# Patient Record
Sex: FEMALE | Race: WHITE | Employment: OTHER | URBAN - METROPOLITAN AREA
[De-identification: names, ages, dates, MRNs, and addresses within clinical notes are randomized per-mention and may not be internally consistent; named-entity substitution may affect disease eponyms.]

---

## 2017-02-20 ENCOUNTER — APPOINTMENT (OUTPATIENT)
Dept: BONE DENSITY | Age: 82
End: 2017-02-20
Attending: INTERNAL MEDICINE
Payer: MEDICARE

## 2017-02-20 ENCOUNTER — HOSPITAL ENCOUNTER (OUTPATIENT)
Dept: MAMMOGRAPHY | Age: 82
Discharge: HOME | End: 2017-02-20
Payer: MEDICARE

## 2017-02-20 ENCOUNTER — HOSPITAL ENCOUNTER (OUTPATIENT)
Dept: ULTRASOUND IMAGING | Age: 82
Discharge: HOME | End: 2017-02-20
Payer: MEDICARE

## 2017-02-20 DIAGNOSIS — N64.52 BLOODY DISCHARGE FROM RIGHT NIPPLE: ICD-10-CM

## 2017-02-20 PROCEDURE — 77065 DX MAMMO INCL CAD UNI: CPT

## 2017-02-20 PROCEDURE — 76642 ULTRASOUND BREAST LIMITED: CPT

## 2018-05-07 ENCOUNTER — OFFICE VISIT (OUTPATIENT)
Dept: SURGERY | Age: 83
End: 2018-05-07

## 2018-05-07 VITALS
HEART RATE: 74 BPM | RESPIRATION RATE: 16 BRPM | OXYGEN SATURATION: 97 % | HEIGHT: 63 IN | WEIGHT: 145 LBS | TEMPERATURE: 97.6 F | BODY MASS INDEX: 25.69 KG/M2

## 2018-05-07 DIAGNOSIS — K64.0 GRADE I HEMORRHOIDS: Primary | ICD-10-CM

## 2018-05-07 RX ORDER — GLIMEPIRIDE 1 MG/1
1 TABLET ORAL DAILY
COMMUNITY
Start: 2018-03-15

## 2018-05-07 RX ORDER — VITAMIN E 268 MG
CAPSULE ORAL DAILY
COMMUNITY

## 2018-05-07 NOTE — PROGRESS NOTES
HPI: Kartik Dempsey is a 80 y.o. female presenting with chief complain of hemorrhoids. The patient felt a balloon type enlargement in the rectal area. This was in February but has not been persistent since that time. She denies bleeding or pain. She has some difficulty cleaning. She states she had a colonoscopy more than 10 years ago. She denies abdominal complaints and has 2-4 bowel movements per day. She denies constipation or diarrhea. She has some occasional soilage of stool and perhaps twice per week fecal incontinence to solid stool. She states she is unaware of the passage of the stool and wears a pad. Past Medical History:   Diagnosis Date    Diabetes ([facility] [address]     Gout     Hearing loss     Hypercholesterolemia     Hypertension     Nipple discharge     Dark blood intermitten    Thyroid disease     hypothyroidism       Past Surgical History:   Procedure Laterality Date    HX CHOLECYSTECTOMY      HX COLONOSCOPY      HX TUMOR REMOVAL      side of abdomen       Family medical history: Negative for colorectal disorders    Social History     Social History    Marital status:      Spouse name: N/A    Number of children: N/A    Years of education: N/A     Social History Main Topics    Smoking status: Former Smoker    Smokeless tobacco: Former User     Quit date: 1987    Alcohol use No    Drug use: None    Sexual activity: Not Asked     Other Topics Concern    None     Social History Narrative       Review of Systems - Review of Systems   Constitutional: Negative. HENT: Positive for hearing loss. Negative for congestion, ear discharge, ear pain, nosebleeds, sinus pain, sore throat and tinnitus. Wears hearing aides   Eyes: Positive for blurred vision. Negative for double vision, photophobia, pain, discharge and redness. Right eye itchy burning watering   Respiratory: Negative. Negative for stridor. Cardiovascular: Negative. Gastrointestinal: Negative. Genitourinary: Negative. Musculoskeletal: Positive for joint pain and neck pain. Negative for back pain, falls and myalgias. Skin: Negative. Neurological: Negative. Endo/Heme/Allergies: Negative. Psychiatric/Behavioral: Negative for depression, hallucinations, memory loss, substance abuse and suicidal ideas. The patient has insomnia. The patient is not nervous/anxious. Outpatient Prescriptions Marked as Taking for the 5/7/18 encounter (Office Visit) with Ivis Coley MD   Medication Sig Dispense Refill    vitamin E (AQUA GEMS) 400 unit capsule Take  by mouth daily.  glimepiride (AMARYL) 1 mg tablet       amLODIPine (NORVASC) 2.5 mg tablet TAKE 1 TABLET BY MOUTH EVERY MORNING  2    levothyroxine (SYNTHROID) 88 mcg tablet TAKE 1 TABLET BY MOUTH DAILY  1    allopurinol (ZYLOPRIM) 300 mg tablet Take 150 mg by mouth daily.  metoprolol tartrate (LOPRESSOR) 50 mg tablet Take  by mouth two (2) times a day. Allergies   Allergen Reactions    Codeine Unknown (comments)    Erythromycin Other (comments)     Pt unable to remember reaction        Vitals:    05/07/18 1106   Pulse: 74   Resp: 16   Temp: 97.6 °F (36.4 °C)   TempSrc: Oral   SpO2: 97%   Weight: 65.8 kg (145 lb)   Height: 5' 3\" (1.6 m)   PainSc:   0 - No pain       Physical Exam   Constitutional: She appears well-developed and well-nourished. HENT:   Head: Normocephalic and atraumatic. Eyes: Conjunctivae and EOM are normal.   Abdominal: Soft. She exhibits no distension. There is no tenderness. Musculoskeletal: Normal range of motion. Lymphadenopathy:     She has no cervical adenopathy. Right: No inguinal adenopathy present. Left: No inguinal adenopathy present. Neurological: She exhibits normal muscle tone. Skin: Skin is warm and dry. No rash noted. Psychiatric: She has a normal mood and affect.  Her speech is normal.   Rectum: Multi-quadrant external hemorrhoidal disease  Digital rectal exam: Moderate tone, no mass  Anoscopy: No substantial internal hemorrhoidal disease    Assessment / Plan    Grade 1 hemorrhoids, fecal incontinence  Recommend high-fiber diet with fiber supplementation of Metamucil  Follow-up in the office as needed  Given her age I am reluctant to offer her hemorrhoidectomy    The diagnoses and plan were discussed with the patient. All questions answered. Plan of care agreed to by all concerned.

## 2018-05-07 NOTE — LETTER
5/7/2018 11:44 AM 
 
Patient:  Yin Novak YOB: 1926 Date of Visit: 5/7/2018 Kenyon Aguayo MD 
[address] [address] [place] [address] VIA Facsimile: [phone] Dear Prieto Ardon saw Naldo Ling in the office today for her hemorrhoids. She states that 3 months ago she had a balloon-like enlargement of her anal tissue. It has not recurred since then. She denies any significant discomfort, though she tells me she has difficulty cleaning the area after bowel function. On exam she has mildly enlarged external hemorrhoidal disease circumferentially. Digital rectal exam and anoscopy are otherwise unremarkable. Given her age I would certainly prefer to manage her conservatively, and recommended a high-fiber diet and fiber supplementation with Metamucil. She can return to the office if the swelling continues, however I am reluctant to offer her hemorrhoidectomy given her advanced age. Given that her hemorrhoidal disease is largely external I do not feel that rubber band ligation of the internal hemorrhoids would be particularly effective. Thank you very much for your referral of Ms. Yin Novak. If you have questions, please do not hesitate to call me. I look forward to following Ms. aRj Bowden along with you and will keep you updated as to her progress. Sincerely, Linda Jones MD

## 2018-09-04 ENCOUNTER — HOSPITAL ENCOUNTER (OUTPATIENT)
Dept: GENERAL RADIOLOGY | Age: 83
Discharge: HOME | End: 2018-09-04
Payer: MEDICARE

## 2018-09-04 DIAGNOSIS — M54.6 THORACIC BACK PAIN: ICD-10-CM

## 2018-09-04 DIAGNOSIS — R07.81 RIB PAIN: ICD-10-CM

## 2018-09-04 PROCEDURE — 71100 X-RAY EXAM RIBS UNI 2 VIEWS: CPT

## 2018-09-04 PROCEDURE — 72074 X-RAY EXAM THORAC SPINE4/>VW: CPT

## 2018-10-05 ENCOUNTER — HOSPITAL ENCOUNTER (INPATIENT)
Age: 83
LOS: 7 days | Discharge: HOME HEALTH | DRG: 202 | End: 2018-10-12
Attending: EMERGENCY MEDICINE | Admitting: INTERNAL MEDICINE
Payer: MEDICARE

## 2018-10-05 ENCOUNTER — APPOINTMENT (OUTPATIENT)
Dept: CT IMAGING | Age: 83
DRG: 202 | End: 2018-10-05
Attending: EMERGENCY MEDICINE
Payer: MEDICARE

## 2018-10-05 ENCOUNTER — APPOINTMENT (OUTPATIENT)
Dept: GENERAL RADIOLOGY | Age: 83
DRG: 202 | End: 2018-10-05
Attending: EMERGENCY MEDICINE
Payer: MEDICARE

## 2018-10-05 DIAGNOSIS — J18.9 COMMUNITY ACQUIRED PNEUMONIA OF LEFT LUNG, UNSPECIFIED PART OF LUNG: Primary | ICD-10-CM

## 2018-10-05 DIAGNOSIS — R06.02 SOB (SHORTNESS OF BREATH): ICD-10-CM

## 2018-10-05 DIAGNOSIS — J42 CHRONIC BRONCHITIS, UNSPECIFIED CHRONIC BRONCHITIS TYPE (HCC): ICD-10-CM

## 2018-10-05 PROBLEM — J20.9 ACUTE BRONCHITIS: Status: ACTIVE | Noted: 2018-10-05

## 2018-10-05 LAB
ALBUMIN SERPL-MCNC: 3.8 G/DL (ref 3.4–5)
ALBUMIN/GLOB SERPL: 1 {RATIO} (ref 0.8–1.7)
ALP SERPL-CCNC: 87 U/L (ref 45–117)
ALT SERPL-CCNC: 54 U/L (ref 13–56)
ANION GAP SERPL CALC-SCNC: 12 MMOL/L (ref 3–18)
AST SERPL-CCNC: 33 U/L (ref 15–37)
BASOPHILS # BLD: 0.1 K/UL (ref 0–0.1)
BASOPHILS NFR BLD: 1 % (ref 0–2)
BILIRUB SERPL-MCNC: 0.7 MG/DL (ref 0.2–1)
BNP SERPL-MCNC: 195 PG/ML (ref 0–1800)
BUN SERPL-MCNC: 28 MG/DL (ref 7–18)
BUN/CREAT SERPL: 18 (ref 12–20)
CALCIUM SERPL-MCNC: 9.4 MG/DL (ref 8.5–10.1)
CHLORIDE SERPL-SCNC: 109 MMOL/L (ref 100–108)
CK MB CFR SERPL CALC: NORMAL % (ref 0–4)
CK MB SERPL-MCNC: <1 NG/ML (ref 5–25)
CK SERPL-CCNC: 26 U/L (ref 26–192)
CO2 SERPL-SCNC: 19 MMOL/L (ref 21–32)
CREAT SERPL-MCNC: 1.53 MG/DL (ref 0.6–1.3)
DIFFERENTIAL METHOD BLD: NORMAL
EOSINOPHIL # BLD: 0.1 K/UL (ref 0–0.4)
EOSINOPHIL NFR BLD: 1 % (ref 0–5)
ERYTHROCYTE [DISTWIDTH] IN BLOOD BY AUTOMATED COUNT: 14.2 % (ref 11.6–14.5)
GLOBULIN SER CALC-MCNC: 3.7 G/DL (ref 2–4)
GLUCOSE BLD STRIP.AUTO-MCNC: 103 MG/DL (ref 70–110)
GLUCOSE BLD STRIP.AUTO-MCNC: 105 MG/DL (ref 70–110)
GLUCOSE BLD STRIP.AUTO-MCNC: 79 MG/DL (ref 70–110)
GLUCOSE BLD STRIP.AUTO-MCNC: 98 MG/DL (ref 70–110)
GLUCOSE SERPL-MCNC: 169 MG/DL (ref 74–99)
HCT VFR BLD AUTO: 44 % (ref 35–45)
HGB BLD-MCNC: 14.9 G/DL (ref 12–16)
LACTATE BLD-SCNC: 1.2 MMOL/L (ref 0.4–2)
LYMPHOCYTES # BLD: 3 K/UL (ref 0.9–3.6)
LYMPHOCYTES NFR BLD: 32 % (ref 21–52)
MCH RBC QN AUTO: 32.8 PG (ref 24–34)
MCHC RBC AUTO-ENTMCNC: 33.9 G/DL (ref 31–37)
MCV RBC AUTO: 96.9 FL (ref 74–97)
MONOCYTES # BLD: 0.8 K/UL (ref 0.05–1.2)
MONOCYTES NFR BLD: 8 % (ref 3–10)
NEUTS SEG # BLD: 5.6 K/UL (ref 1.8–8)
NEUTS SEG NFR BLD: 58 % (ref 40–73)
PLATELET # BLD AUTO: 292 K/UL (ref 135–420)
PMV BLD AUTO: 10 FL (ref 9.2–11.8)
POTASSIUM SERPL-SCNC: 4.5 MMOL/L (ref 3.5–5.5)
PROT SERPL-MCNC: 7.5 G/DL (ref 6.4–8.2)
RBC # BLD AUTO: 4.54 M/UL (ref 4.2–5.3)
SODIUM SERPL-SCNC: 140 MMOL/L (ref 136–145)
TROPONIN I SERPL-MCNC: <0.02 NG/ML (ref 0–0.04)
WBC # BLD AUTO: 9.6 K/UL (ref 4.6–13.2)

## 2018-10-05 PROCEDURE — 74011250636 HC RX REV CODE- 250/636: Performed by: EMERGENCY MEDICINE

## 2018-10-05 PROCEDURE — 74011000250 HC RX REV CODE- 250: Performed by: EMERGENCY MEDICINE

## 2018-10-05 PROCEDURE — 74011250637 HC RX REV CODE- 250/637: Performed by: INTERNAL MEDICINE

## 2018-10-05 PROCEDURE — 85025 COMPLETE CBC W/AUTO DIFF WBC: CPT | Performed by: EMERGENCY MEDICINE

## 2018-10-05 PROCEDURE — 82550 ASSAY OF CK (CPK): CPT | Performed by: EMERGENCY MEDICINE

## 2018-10-05 PROCEDURE — 96361 HYDRATE IV INFUSION ADD-ON: CPT

## 2018-10-05 PROCEDURE — 82962 GLUCOSE BLOOD TEST: CPT

## 2018-10-05 PROCEDURE — 96374 THER/PROPH/DIAG INJ IV PUSH: CPT

## 2018-10-05 PROCEDURE — 71045 X-RAY EXAM CHEST 1 VIEW: CPT

## 2018-10-05 PROCEDURE — 77030029684 HC NEB SM VOL KT MONA -A

## 2018-10-05 PROCEDURE — 83880 ASSAY OF NATRIURETIC PEPTIDE: CPT | Performed by: EMERGENCY MEDICINE

## 2018-10-05 PROCEDURE — 99285 EMERGENCY DEPT VISIT HI MDM: CPT

## 2018-10-05 PROCEDURE — 94640 AIRWAY INHALATION TREATMENT: CPT

## 2018-10-05 PROCEDURE — 70491 CT SOFT TISSUE NECK W/DYE: CPT

## 2018-10-05 PROCEDURE — 74011250636 HC RX REV CODE- 250/636: Performed by: INTERNAL MEDICINE

## 2018-10-05 PROCEDURE — 65660000000 HC RM CCU STEPDOWN

## 2018-10-05 PROCEDURE — 74011636320 HC RX REV CODE- 636/320: Performed by: EMERGENCY MEDICINE

## 2018-10-05 PROCEDURE — 83605 ASSAY OF LACTIC ACID: CPT

## 2018-10-05 PROCEDURE — 87040 BLOOD CULTURE FOR BACTERIA: CPT | Performed by: EMERGENCY MEDICINE

## 2018-10-05 PROCEDURE — 80053 COMPREHEN METABOLIC PANEL: CPT | Performed by: EMERGENCY MEDICINE

## 2018-10-05 RX ORDER — GLIMEPIRIDE 2 MG/1
1 TABLET ORAL
Status: DISCONTINUED | OUTPATIENT
Start: 2018-10-06 | End: 2018-10-06

## 2018-10-05 RX ORDER — ALBUTEROL SULFATE 0.83 MG/ML
2.5 SOLUTION RESPIRATORY (INHALATION)
Status: COMPLETED | OUTPATIENT
Start: 2018-10-05 | End: 2018-10-05

## 2018-10-05 RX ORDER — DEXTROSE 50 % IN WATER (D50W) INTRAVENOUS SYRINGE
25-50 AS NEEDED
Status: DISCONTINUED | OUTPATIENT
Start: 2018-10-05 | End: 2018-10-12 | Stop reason: HOSPADM

## 2018-10-05 RX ORDER — LEVOFLOXACIN 5 MG/ML
750 INJECTION, SOLUTION INTRAVENOUS EVERY 24 HOURS
Status: DISCONTINUED | OUTPATIENT
Start: 2018-10-05 | End: 2018-10-05

## 2018-10-05 RX ORDER — MAGNESIUM SULFATE 100 %
16 CRYSTALS MISCELLANEOUS AS NEEDED
Status: DISCONTINUED | OUTPATIENT
Start: 2018-10-05 | End: 2018-10-12 | Stop reason: HOSPADM

## 2018-10-05 RX ORDER — NYSTATIN 100000 [USP'U]/ML
10 SUSPENSION ORAL 2 TIMES DAILY
COMMUNITY
End: 2019-07-29

## 2018-10-05 RX ORDER — SODIUM CHLORIDE 9 MG/ML
50 INJECTION, SOLUTION INTRAVENOUS CONTINUOUS
Status: DISCONTINUED | OUTPATIENT
Start: 2018-10-05 | End: 2018-10-11

## 2018-10-05 RX ORDER — MELATONIN
5000 DAILY
Status: DISCONTINUED | OUTPATIENT
Start: 2018-10-06 | End: 2018-10-12 | Stop reason: HOSPADM

## 2018-10-05 RX ORDER — ACETAMINOPHEN 325 MG/1
650 TABLET ORAL
Status: DISCONTINUED | OUTPATIENT
Start: 2018-10-05 | End: 2018-10-12 | Stop reason: HOSPADM

## 2018-10-05 RX ORDER — LEVOFLOXACIN 5 MG/ML
750 INJECTION, SOLUTION INTRAVENOUS
Status: DISCONTINUED | OUTPATIENT
Start: 2018-10-07 | End: 2018-10-06

## 2018-10-05 RX ORDER — AMLODIPINE BESYLATE 2.5 MG/1
2.5 TABLET ORAL DAILY
Status: DISCONTINUED | OUTPATIENT
Start: 2018-10-06 | End: 2018-10-12 | Stop reason: HOSPADM

## 2018-10-05 RX ORDER — LEVOTHYROXINE SODIUM 88 UG/1
88 TABLET ORAL
Status: DISCONTINUED | OUTPATIENT
Start: 2018-10-06 | End: 2018-10-12 | Stop reason: HOSPADM

## 2018-10-05 RX ORDER — METOPROLOL TARTRATE 50 MG/1
50 TABLET ORAL 2 TIMES DAILY
Status: DISCONTINUED | OUTPATIENT
Start: 2018-10-05 | End: 2018-10-12 | Stop reason: HOSPADM

## 2018-10-05 RX ORDER — ALLOPURINOL 100 MG/1
150 TABLET ORAL DAILY
Status: DISCONTINUED | OUTPATIENT
Start: 2018-10-06 | End: 2018-10-12 | Stop reason: HOSPADM

## 2018-10-05 RX ORDER — LANOLIN ALCOHOL/MO/W.PET/CERES
1000 CREAM (GRAM) TOPICAL DAILY
Status: DISCONTINUED | OUTPATIENT
Start: 2018-10-06 | End: 2018-10-12 | Stop reason: HOSPADM

## 2018-10-05 RX ORDER — ONDANSETRON 2 MG/ML
4 INJECTION INTRAMUSCULAR; INTRAVENOUS
Status: DISCONTINUED | OUTPATIENT
Start: 2018-10-05 | End: 2018-10-12 | Stop reason: HOSPADM

## 2018-10-05 RX ORDER — INSULIN LISPRO 100 [IU]/ML
INJECTION, SOLUTION INTRAVENOUS; SUBCUTANEOUS
Status: DISCONTINUED | OUTPATIENT
Start: 2018-10-05 | End: 2018-10-12 | Stop reason: HOSPADM

## 2018-10-05 RX ORDER — LOVASTATIN 20 MG/1
40 TABLET ORAL DAILY
Status: DISCONTINUED | OUTPATIENT
Start: 2018-10-06 | End: 2018-10-12 | Stop reason: HOSPADM

## 2018-10-05 RX ADMIN — SODIUM CHLORIDE 500 ML: 900 INJECTION, SOLUTION INTRAVENOUS at 08:05

## 2018-10-05 RX ADMIN — METOPROLOL TARTRATE 50 MG: 50 TABLET ORAL at 19:05

## 2018-10-05 RX ADMIN — IOPAMIDOL 50 ML: 612 INJECTION, SOLUTION INTRAVENOUS at 09:30

## 2018-10-05 RX ADMIN — CEFTRIAXONE SODIUM 1 G: 1 INJECTION, POWDER, FOR SOLUTION INTRAMUSCULAR; INTRAVENOUS at 09:01

## 2018-10-05 RX ADMIN — ALBUTEROL SULFATE 2.5 MG: 2.5 SOLUTION RESPIRATORY (INHALATION) at 09:43

## 2018-10-05 RX ADMIN — ALBUTEROL SULFATE 2.5 MG: 2.5 SOLUTION RESPIRATORY (INHALATION) at 08:05

## 2018-10-05 RX ADMIN — LEVOFLOXACIN 750 MG: 5 INJECTION, SOLUTION INTRAVENOUS at 14:05

## 2018-10-05 RX ADMIN — SODIUM CHLORIDE 75 ML/HR: 900 INJECTION, SOLUTION INTRAVENOUS at 14:09

## 2018-10-05 RX ADMIN — ALBUTEROL SULFATE 2.5 MG: 2.5 SOLUTION RESPIRATORY (INHALATION) at 13:50

## 2018-10-05 NOTE — ED NOTES
TRANSFER - OUT REPORT: 
 
Verbal report given to ZOE Vivar(name) on Laney Pouch  being transferred to 3N(unit) for routine progression of care Report consisted of patients Situation, Background, Assessment and  
Recommendations(SBAR). Information from the following report(s) SBAR, ED Summary, Intake/Output, MAR, Recent Results and Cardiac Rhythm NSR was reviewed with the receiving nurse. Opportunity for questions and clarification was provided. Patient transported with: 
Patient Transport

## 2018-10-05 NOTE — ED NOTES
Pt arrived to the ED with co SOB. Pt speaking in broken sentences. Pt states, \"I've had an ear infection and a sore throat for a week. \" Pt has been taking nystatin

## 2018-10-05 NOTE — ED NOTES
Pt up to bedside commode at this time, tolerated well, reported sob while moving, resolved when returned to bed, mild upper resp wheezing noted upon movement, resolved when returned to bed.

## 2018-10-05 NOTE — PROGRESS NOTES
conducted an initial consultation and Spiritual Assessment for Castillo Millan, who is a 80 y.o.,female. Patients Primary Language is: [place]. According to the patients EMR [employer] Affiliation is: [place]. The reason the Patient came to the hospital is:  
Patient Active Problem List  
 Diagnosis Date Noted  Acute bronchitis 10/05/2018  SOB (shortness of breath) 10/05/2018  Community acquired pneumonia 10/05/2018  Bloody discharge from right nipple 08/12/2016 The  provided the following Interventions: 
Initiated a relationship of care and support. Explored issues of adarsh, belief, spirituality and Sikh/ritual needs while hospitalized. Listened empathically. Provided chaplaincy education. Provided information about Spiritual Care Services. Offered prayer and assurance of continued prayers on patient's behalf. Chart reviewed. The following outcomes where achieved: 
Patient shared limited information about both their medical narrative and spiritual journey/beliefs. Patient processed feeling about current hospitalization. Patient expressed gratitude for 's visit. Assessment: 
Patient does not have any Sikh/cultural needs that will affect patients preferences in health care. There are no spiritual or Sikh issues which require intervention at this time. Plan: 
Chaplains will continue to follow and will provide pastoral care on an as needed/requested basis.  recommends bedside caregivers page  on duty if patient shows signs of acute spiritual or emotional distress. [address] [facility]  
[phone]

## 2018-10-05 NOTE — IP AVS SNAPSHOT
[address] [address] [place] Patient: Blaze Leyva MRN: [id number] [facility]:[birth year] About your hospitalization You were admitted on:  October 5, 2018 You last received care in the:  [facility] You were discharged on:  October 12, 2018 Why you were hospitalized Your primary diagnosis was:  Not on File Your diagnoses also included:  Acute Bronchitis, Sob (Shortness Of Breath), Community Acquired Pneumonia Follow-up Information Follow up With Details Comments Contact Info Indira Dickey MD Schedule an appointment as soon as possible for a visit in 5 days  [address] [facility] [address] [facility] [id number] [phone] Kenia Ward MD On 11/15/2018 at 9:20AM; Please bring Photo ID, Insurance Card & list of meds. [address] [address] Discharge Orders None A check sindhu indicates which time of day the medication should be taken. My Medications START taking these medications Instructions Each Dose to Equal  
 Morning Noon Evening Bedtime  
 benzonatate 100 mg capsule Commonly known as:  TESSALON Take 1 Cap by mouth three (3) times daily as needed for Cough for up to 7 days. 100 mg  
    
   
   
   
  
 docusate sodium 100 mg capsule Commonly known as:  [place] Take 1 Cap by mouth two (2) times daily as needed for Constipation for up to 90 days. 100 mg  
    
   
   
   
  
 famotidine 20 mg tablet Commonly known as:  PEPCID Your next dose is:  10/12 8pm  
   
 Take 1 Tab by mouth two (2) times a day. 20 mg  
    
   
   
   
  
  
 guaiFENesin 100 mg/5 mL liquid Commonly known as:  ROBITUSSIN Take 5 mL by mouth every four (4) hours as needed for Cough. 100 mg Lactobacillus Acidoph & Bulgar 1 million cell Tab tablet Commonly known as:  Marya Hurst Your next dose is:  10/13 8pm  
   
 Take 2 Tabs by mouth two (2) times a day. 2 Tab  
    
   
   
   
  
  
 levoFLOXacin 750 mg tablet Commonly known as:  René Méndez Start taking on:  10/13/2018 Take 1 Tab by mouth every fourty-eight (48) hours for 4 days. 750 mg  
    
  
   
   
   
  
 phenol throat spray 1.4 % spray Commonly known as:  Terisa Gaster Take 1 Spray by mouth as needed for Sore throat. 1 Spray CONTINUE taking these medications Instructions Each Dose to Equal  
 Morning Noon Evening Bedtime  
 allopurinol 300 mg tablet Commonly known as:  Ilona Seed Your next dose is:  10/13 9am  
   
 Take 150 mg by mouth daily. 150 mg  
    
  
   
   
   
  
 amLODIPine 2.5 mg tablet Commonly known as:  Hersdina Saras Your next dose is:  10/13 9am  
   
 TAKE 1 TABLET BY MOUTH EVERY MORNING  
     
  
   
   
   
  
 glimepiride 1 mg tablet Commonly known as:  AMARYL Your next dose is:  10/13 9am  
   
      
  
   
   
   
  
 levothyroxine 88 mcg tablet Commonly known as:  SYNTHROID Your next dose is:  10/13 7am  
   
 TAKE 1 TABLET BY MOUTH DAILY lovastatin 40 mg tablet Commonly known as:  MEVACOR Your next dose is:  10/13 9am  
   
 TAKE 1 TABLET BY MOUTH EVERY DAY  
     
  
   
   
   
  
 metFORMIN  mg tablet Commonly known as:  GLUCOPHAGE XR Your next dose is:  10/13 9am  
   
 Take 750 mg by mouth daily. 750 mg  
    
  
   
   
   
  
 metoprolol tartrate 50 mg tablet Commonly known as:  LOPRESSOR Your next dose is:  10/12 8pm  
   
 Take  by mouth two (2) times a day. nystatin 100,000 unit/mL suspension Commonly known as:  MYCOSTATIN Your next dose is:  10/12 8pm  
   
 Take 10 mL by mouth two (2) times a day. 10 mL  
    
   
   
   
  
  
 VITAMIN B-12 1,000 mcg tablet Generic drug:  cyanocobalamin Your next dose is:  10/13 9am  
   
 Take 1,000 mcg by mouth daily. 1000 mcg VITAMIN D3 1,000 unit tablet Generic drug:  cholecalciferol Your next dose is:  10/13 9am  
   
 Take 5,000 Units by mouth daily. 5000 Units  
    
   
   
   
  
 vitamin E 400 unit capsule Commonly known as:  [address] Your next dose is:  10/13 9am  
   
 Take  by mouth daily. STOP taking these medications   
 indomethacin 50 mg capsule Commonly known as:  INDOCIN Where to Get Your Medications Information on where to get these meds will be given to you by the nurse or doctor. ! Ask your nurse or doctor about these medications  
  benzonatate 100 mg capsule  
 docusate sodium 100 mg capsule  
 famotidine 20 mg tablet  
 guaiFENesin 100 mg/5 mL liquid Lactobacillus Acidoph & Bulgar 1 million cell Tab tablet  
 levoFLOXacin 750 mg tablet  
 phenol throat spray 1.4 % spray Discharge Instructions Discharge Instructions Patient: Valerie Wadsworth MRN: [id number]  CSN: [id number] YOB: 1926  Age: 80 y.o. Sex: female DOA: 10/5/2018 LOS:  LOS: 7 days   Discharge Date: DIET:  Cardiac and Diabetic mechanical soft Diet with necator thick liquids ACTIVITY: Activity as tolerated Home health care for Skilled care for DM, Hypertension and medication management 
 
·    PT/OT consult ·             Speech consult ADDITIONAL INFORMATION: If you experience any of the following symptoms but not limited to Fever, chills, nausea, vomiting, diarrhea, change in mentation, falling, bleeding, shortness of breath, chest pain, please call your primary care physician or return to the emergency room if you cannot get hold of your doctor:  
 
FOLLOW UP CARE: 
Dr. Himanshu Montana MD in 7-10 days. Please call and set up an appointment. LEXI Cardona in 4 week David Galo MD 
10/12/2018 12:31 PM 
 
 
 
 
 
  
  
  
 Sparxent Announcement We are excited to announce that we are making your provider's discharge notes available to you in Sparxent. You will see these notes when they are completed and signed by the physician that discharged you from your recent hospital stay. If you have any questions or concerns about any information you see in Sparxent, please call the Health Information Department where you were seen or reach out to your Primary Care Provider for more information about your plan of care. Introducing hospitals & HEALTH SERVICES! [facility] introduces Sparxent patient portal. Now you can access parts of your medical record, email your doctor's office, and request medication refills online. 1. In your internet browser, go to [email or web address] 2. Click on the First Time User? Click Here link in the Sign In box. You will see the New Member Sign Up page. 3. Enter your Sparxent Access Code exactly as it appears below. You will not need to use this code after youve completed the sign-up process. If you do not sign up before the expiration date, you must request a new code. · Sparxent Access Code: [email or web address] Expires: 12/3/2018  9:54 AM 
 
4. Enter the last four digits of your Social Security Number (xxxx) and Date of Birth (mm/dd/yyyy) as indicated and click Submit. You will be taken to the next sign-up page. 5. Create a Sparxent ID. This will be your Sparxent login ID and cannot be changed, so think of one that is secure and easy to remember. 6. Create a Sparxent password. You can change your password at any time. 7. Enter your Password Reset Question and Answer. This can be used at a later time if you forget your password. 8. Enter your e-mail address. You will receive e-mail notification when new information is available in [address] 9. Click Sign Up. You can now view and download portions of your medical record. 10. Click the Download Summary menu link to download a portable copy of your medical information. If you have questions, please visit the Frequently Asked Questions section of the [employer] website. Remember, [employer] is NOT to be used for urgent needs. For medical emergencies, dial 911. Now available from your iPhone and Android! Introducing Umair Nj As a [facility] patient, I wanted to make you aware of our electronic visit tool called Umair Nj. [employer] allows you to connect within minutes with a medical provider 24 hours a day, seven days a week via a mobile device or tablet or logging into a secure website from your computer. You can access Umair Nj from anywhere in the [place]. A virtual visit might be right for you when you have a simple condition and feel like you just dont want to get out of bed, or cant get away from work for an appointment, when your regular [facility] provider is not available (evenings, weekends or holidays), or when youre out of town and need minor care. Electronic visits cost only $49 and if the [employer] provider determines a prescription is needed to treat your condition, one can be electronically transmitted to a nearby pharmacy*. Please take a moment to enroll today if you have not already done so. The enrollment process is free and takes just a few minutes. To enroll, please download the WebNotes eliecer to your tablet or phone, or visit [email or web address] to enroll on your computer. And, as an [address] [place] [address] patient with a [employer] account, the results of your visits will be scanned into your electronic medical record and your primary care provider will be able to view the scanned results. We urge you to continue to see your regular [facility] provider for your ongoing medical care.   And while your primary care provider may not be the one available when you seek a Umair Bolañosnadjafin virtual visit, the peace of mind you get from getting a real diagnosis real time can be priceless. For more information on [employer], view our Frequently Asked Questions (FAQs) at [email or web address]. Sincerely, 
 
Attila Chu MD 
Chief Medical Officer [employer] *:  certain medications cannot be prescribed via Umair Mikyiza Providers Seen During Your Hospitalization Provider Specialty Primary office phone Nima Freedman DO Emergency Medicine [phone] Marisol Medellin MD Internal Medicine [phone] Lawanda Hamilton MD Internal Medicine [phone] Spike Roberto MD Internal Medicine [phone] Immunizations Administered for This Admission Name Date Influenza Vaccine (Quad) PF 10/12/2018,  Deferred () Your Primary Care Physician (PCP) Primary Care Physician Office Phone Office Fax Hernando Rivera [phone] You are allergic to the following Allergen Reactions Codeine Unknown (comments) Erythromycin Other (comments) Pt unable to remember reaction Recent Documentation Height Weight Breastfeeding? BMI OB Status Smoking Status 1.6 m 60.9 kg No 23.77 kg/m2 Postmenopausal Former Smoker Emergency Contacts Name Discharge Info Relation Home Work Mobile Yon Zamora DISCHARGE CAREGIVER [3] Child [2] [phone] Patient Belongings The following personal items are in your possession at time of discharge: 
     Visual Aid: At bedside, Glasses   Hearing Aids/Status: With patient  Home Medications: None   Jewelry: Ring          Personal Items Sent to Safe: none Please provide this summary of care documentation to your next provider. Signatures-by signing, you are acknowledging that this After Visit Summary has been reviewed with you and you have received a copy. Patient Signature:  ____________________________________________________________ Date:  ____________________________________________________________  
  
Brittnee Journey Provider Signature:  ____________________________________________________________ Date:  ____________________________________________________________

## 2018-10-05 NOTE — ED PROVIDER NOTES
EMERGENCY DEPARTMENT HISTORY AND PHYSICAL EXAM 
 
7:40 AM 
 
 
Date: 10/5/2018 Patient Name: Nusrat Akhtar History of Presenting Illness Chief Complaint Patient presents with  Shortness of Breath History Provided By: Patient and patient's son Chief Complaint: SOB Duration:  6 days Timing:  Worsening Location: N/A Quality: N/A Severity: N/A Modifying Factors: The patient was recently seen at [facility] and her PCP Associated Symptoms: Trouble swallowing and cough. . Denies fever, chills, vomiting, diarrhea, chest pain. Additional History (Context): Nusrat Akhtar is a 80 y.o. female who presents with worsening SOB with associated symptoms wheezing and cough for 6 days. She reports difficulty eating and drinking stating \"it just won't go down\" and \"it feels like there is stuff in my throat\". She denies fever, chills, vomiting, diarrhea, and chest pain. The patient was recently seen by [facility] and her PCP last week for ear pain and sore throat which are currently resolved. Denies any history of asthma or COPD. PCP: Yuko Collazo MD 
 
 
Past History Past Medical History: 
Past Medical History:  
Diagnosis Date  Diabetes ([address]  Gout  Hearing loss  Hypercholesterolemia  Hypertension  Nipple discharge Dark blood intermitten  Thyroid disease   
 hypothyroidism Past Surgical History: 
Past Surgical History:  
Procedure Laterality Date  HX CHOLECYSTECTOMY  HX COLONOSCOPY    
 HX TUMOR REMOVAL    
 side of abdomen Family History: 
History reviewed. No pertinent family history. Social History: 
Social History Substance Use Topics  Smoking status: Former Smoker  Smokeless tobacco: Former User Quit date: 26  Alcohol use No  
 
 
Allergies: Allergies Allergen Reactions  Codeine Unknown (comments)  Erythromycin Other (comments) Pt unable to remember reaction Review of Systems Review of Systems Constitutional: Negative for fever. HENT: Positive for trouble swallowing. Negative for rhinorrhea and sinus pressure. Eyes: Negative for redness and visual disturbance. Respiratory: Positive for cough and shortness of breath. Negative for wheezing. Cardiovascular: Negative for chest pain and palpitations. Gastrointestinal: Negative for abdominal pain, diarrhea, nausea and vomiting. Genitourinary: Negative for difficulty urinating and dysuria. Musculoskeletal: Negative for arthralgias and neck stiffness. Skin: Negative for pallor. Neurological: Negative for dizziness and headaches. Hematological: Does not bruise/bleed easily. All other systems reviewed and are negative. Physical Exam  
 
Visit Vitals  /66  Pulse 70  Temp 96.8 °F (36 °C)  Resp 25  SpO2 98% Physical Exam  
Constitutional: She is oriented to person, place, and time. She appears well-developed and well-nourished. No distress. HENT:  
Head: Normocephalic and atraumatic. Mouth/Throat: Uvula is midline and oropharynx is clear and moist.  
Oroharynx clear. No erythema. Eyes: Conjunctivae are normal. Pupils are equal, round, and reactive to light. No scleral icterus. Neck: Normal range of motion. Neck supple. Cardiovascular: Normal rate and intact distal pulses. Capillary refill < 3 seconds Pulmonary/Chest: She is in respiratory distress. She has no wheezes. She has rhonchi. Actively coughing at bedside. Scattered  rhonchi Abdominal: Soft. Bowel sounds are normal. She exhibits no distension. There is no tenderness. Musculoskeletal: Normal range of motion. No lower extremity edema. Lymphadenopathy:  
  She has no cervical adenopathy. Neurological: She is alert and oriented to person, place, and time. No cranial nerve deficit. She exhibits normal muscle tone. Coordination normal.  
Skin: Skin is warm and dry. No rash noted. She is not diaphoretic. Psychiatric: She has a normal mood and affect. Her behavior is normal.  
Nursing note and vitals reviewed. Diagnostic Study Results Labs - Recent Results (from the past 12 hour(s)) CBC WITH AUTOMATED DIFF Collection Time: 10/05/18  7:10 AM  
Result Value Ref Range WBC 9.6 4.6 - 13.2 K/uL  
 RBC 4.54 4.20 - 5.30 M/uL  
 HGB 14.9 12.0 - 16.0 g/dL HCT 44.0 35.0 - 45.0 % MCV 96.9 74.0 - 97.0 FL  
 MCH 32.8 24.0 - 34.0 PG  
 MCHC 33.9 31.0 - 37.0 g/dL  
 RDW 14.2 11.6 - 14.5 % PLATELET 790 901 - 907 K/uL MPV 10.0 9.2 - 11.8 FL  
 NEUTROPHILS 58 40 - 73 % LYMPHOCYTES 32 21 - 52 % MONOCYTES 8 3 - 10 % EOSINOPHILS 1 0 - 5 % BASOPHILS 1 0 - 2 %  
 ABS. NEUTROPHILS 5.6 1.8 - 8.0 K/UL  
 ABS. LYMPHOCYTES 3.0 0.9 - 3.6 K/UL  
 ABS. MONOCYTES 0.8 0.05 - 1.2 K/UL  
 ABS. EOSINOPHILS 0.1 0.0 - 0.4 K/UL  
 ABS. BASOPHILS 0.1 0.0 - 0.1 K/UL  
 DF AUTOMATED METABOLIC PANEL, COMPREHENSIVE Collection Time: 10/05/18  7:10 AM  
Result Value Ref Range Sodium 140 136 - 145 mmol/L Potassium 4.5 3.5 - 5.5 mmol/L Chloride 109 (H) 100 - 108 mmol/L  
 CO2 19 (L) 21 - 32 mmol/L Anion gap 12 3.0 - 18 mmol/L Glucose 169 (H) 74 - 99 mg/dL BUN 28 (H) 7.0 - 18 MG/DL Creatinine 1.53 (H) 0.6 - 1.3 MG/DL  
 BUN/Creatinine ratio 18 12 - 20 GFR est AA 38 (L) >60 ml/min/1.73m2 GFR est non-AA 32 (L) >60 ml/min/1.73m2 Calcium 9.4 8.5 - 10.1 MG/DL Bilirubin, total 0.7 0.2 - 1.0 MG/DL  
 ALT (SGPT) 54 13 - 56 U/L  
 AST (SGOT) 33 15 - 37 U/L Alk. phosphatase 87 45 - 117 U/L Protein, total 7.5 6.4 - 8.2 g/dL Albumin 3.8 3.4 - 5.0 g/dL Globulin 3.7 2.0 - 4.0 g/dL A-G Ratio 1.0 0.8 - 1.7 NT-PRO BNP Collection Time: 10/05/18  7:10 AM  
Result Value Ref Range NT pro- 0 - 1800 PG/ML  
CARDIAC PANEL,(CK, CKMB & TROPONIN) Collection Time: 10/05/18  7:10 AM  
Result Value Ref Range CK 26 26 - 192 U/L  
 CK - MB <1.0 <3.6 ng/ml CK-MB Index  0.0 - 4.0 % CALCULATION NOT PERFORMED WHEN RESULT IS BELOW LINEAR LIMIT Troponin-I, Qt. <0.02 0.0 - 0.045 NG/ML  
POC LACTIC ACID Collection Time: 10/05/18  8:45 AM  
Result Value Ref Range Lactic Acid (POC) 1.2 0.4 - 2.0 mmol/L Radiologic Studies -  
CT NECK SOFT TISSUE W CONT Final Result XR CHEST PORT Final Result XR CHEST: 
IMPRESSION: Streaky opacity left lung base may represent atelectasis or 
developing infiltrate. CT NECK SOFT TISSUE: 
IMPRESSION: 
  
No evidence for an opaque foreign body or soft tissue mass in the nasopharynx, 
oropharynx, or hypopharynx. No evidence for a foreign body in the proximal trachea. Air noted throughout the length of the esophagus in the field-of-view this may 
represent tertiary contractions or achalasia. No evidence for diverticulum off the proximal esophagus or hypopharynx. [place] [facility] Decision Making I am the first provider for this patient. I reviewed the vital signs, available nursing notes, past medical history, past surgical history, family history and social history. Vital Signs-Reviewed the patient's vital signs. Pulse Oximetry Analysis -  99% on room air (Interpretation) Records Reviewed: Nursing Notes and Old Medical Records (Time of Review: 7:40 AM) Provider Notes (Medical Decision Making): MDM Number of Diagnoses or Management Options Chronic bronchitis, unspecified chronic bronchitis type [facility]):  
Community acquired pneumonia of left lung, unspecified part of lung: new, needed workup SOB (shortness of breath): new, needed workup Diagnosis management comments: Ddx: Pulmonary, cardiac, foreign body in esophagus, sore throat, neoplasm, infectious, GERD, anxiety. Will get labs, EKG, CXR, CT soft tissue neck, and duo nebulizer. Amount and/or Complexity of Data Reviewed Clinical lab tests: ordered and reviewed Tests in the radiology section of CPT®: ordered and reviewed Tests in the medicine section of CPT®: ordered and reviewed Discussion of test results with the performing providers: yes Obtain history from someone other than the patient: yes (son) Review and summarize past medical records: yes Discuss the patient with other providers: yes Independent visualization of images, tracings, or specimens: yes Risk of Complications, Morbidity, and/or Mortality Presenting problems: high Patient Progress Patient progress: stable Medications  
amLODIPine (NORVASC) tablet 2.5 mg (not administered)  
metoprolol tartrate (LOPRESSOR) tablet 50 mg (not administered) levoFLOXacin (LEVAQUIN) 750 mg in D5W IVPB (750 mg IntraVENous New Bag 10/5/18 1405) insulin lispro (HUMALOG) injection (not administered) 0.9% sodium chloride infusion (75 mL/hr IntraVENous New Bag 10/5/18 1409)  
sodium chloride 0.9 % bolus infusion 500 mL (0 mL IntraVENous IV Completed 10/5/18 0859)  
albuterol (PROVENTIL VENTOLIN) nebulizer solution 2.5 mg (2.5 mg Nebulization Given 10/5/18 0805) cefTRIAXone (ROCEPHIN) 1 g in sterile water (preservative free) 10 mL IV syringe (1 g IntraVENous Given 10/5/18 0901) iopamidol (ISOVUE 300) 61 % contrast injection 50 mL (50 mL IntraVENous Given 10/5/18 0930)  
albuterol (PROVENTIL VENTOLIN) nebulizer solution 2.5 mg (2.5 mg Nebulization Given 10/5/18 0943)  
albuterol (PROVENTIL VENTOLIN) nebulizer solution 2.5 mg (2.5 mg Nebulization Given 10/5/18 1350) ED Course: Progress Notes, Reevaluation, and Consults: 
79 yo with CAP Coughing, wheezing, sob, states FB sensation when coughing CT nothing acute Multiple nebs given PNA on CxR Cultures, IV abx Port 112 Admit I discussed results, dx's with pt and sone 11:18 AM Consult: I discussed care with Dr. Vandana Cordero (Hospitalist). It was a standard discussion including patient history, chief complaint, available diagnostic results, and predicted treatment course. Accepts patient. For Hospitalized Patients: 
 
1. Hospitalization Decision Time: The decision to hospitalize the patient was made by Mara Swanson DO at 10:40 AM on 10/5/2018 2. Aspirin: Aspirin was not given because the patient did not present with a stroke at the time of their Emergency Department evaluation Diagnosis Clinical Impression: 1. Community acquired pneumonia of left lung, unspecified part of lung 2. SOB (shortness of breath) 3. Chronic bronchitis, unspecified chronic bronchitis type ([facility] Disposition: Admit Follow-up Information None Attestations: 
Scribe Attestation Andria Green acting as a scribe for and in the presence of Mara Swanson DO October 05, 2018 at 1:51 PM 
    
Provider Attestation:     
I personally performed the services described in the documentation, reviewed the documentation, as recorded by the scribe in my presence, and it accurately and completely records my words and actions. October 05, 2018 at 1:51 PM - Mara Swanson DO   
_______________________________

## 2018-10-05 NOTE — IP AVS SNAPSHOT
Summary of Care Report The Summary of Care report has been created to help improve care coordination. Users with access to [employer] or [employer] (Web-based application) may access additional patient information including the Discharge Summary. If you are not currently a [employer] user and need more information, please call the number listed below in the [address] section and ask to be connected with Medical Records. Facility Information Name Address Phone [address] [place] [address] [facility] [address] [phone] Patient Information Patient Name Sex HEIDY Decker ([id number]) Female 1926 Discharge Information Admitting Provider Service Area Unit Gregory Nolasco MD / [facility] Telemetry / [phone] Discharge Provider Discharge Date/Time Discharge Disposition Destination (none) 10/12/2018 (Pending) [facility] (none) Patient Language Language ENGLISH [13] Hospital Problems as of 10/12/2018  Reviewed: 2018 11:19 AM by Herbert Leroy MD  
  
  
  
 Class Noted - Resolved Last Modified POA Active Problems Acute bronchitis  10/5/2018 - Present 10/5/2018 by [employer], DO Unknown Entered by [employer], DO  
  SOB (shortness of breath)  10/5/2018 - Present 10/5/2018 by [employer], DO Unknown Entered by [employer], DO Community acquired pneumonia  10/5/2018 - Present 10/5/2018 by [employer], DO Unknown Entered by sezmi, DO Non-Hospital Problems as of 10/12/2018  Reviewed: 2018 11:19 AM by Herbetr Leroy MD  
  
  
  
 Class Noted - Resolved Last Modified Active Problems Bloody discharge from right nipple  2016 - Present 2016 by Mee Mayberry MD  
  Entered by Mee Mayberry MD  
  
You are allergic to the following Allergen Reactions Codeine Unknown (comments) Erythromycin Other (comments) Pt unable to remember reaction Current Discharge Medication List  
  
START taking these medications Dose & Instructions Dispensing Information Comments  
 benzonatate 100 mg capsule Commonly known as:  TESSALON Dose:  100 mg Take 1 Cap by mouth three (3) times daily as needed for Cough for up to 7 days. Quantity:  30 Cap Refills:  0  
   
 docusate sodium 100 mg capsule Commonly known as:  Austyn Danuta Dose:  100 mg Take 1 Cap by mouth two (2) times daily as needed for Constipation for up to 90 days. Quantity:  30 Cap Refills:  0  
   
 famotidine 20 mg tablet Commonly known as:  PEPCID Dose:  20 mg Take 1 Tab by mouth two (2) times a day. Quantity:  6 Tab Refills:  0  
   
 guaiFENesin 100 mg/5 mL liquid Commonly known as:  ROBITUSSIN Dose:  100 mg Take 5 mL by mouth every four (4) hours as needed for Cough. Quantity:  236 mL Refills:  0 Lactobacillus Acidoph & Bulgar 1 million cell Tab tablet Commonly known as:  Mikala Finical Dose:  2 Tab Take 2 Tabs by mouth two (2) times a day. Quantity:  10 Tab Refills:  0  
   
 levoFLOXacin 750 mg tablet Commonly known as:  [place] Start taking on:  10/13/2018 Dose:  750 mg Take 1 Tab by mouth every fourty-eight (48) hours for 4 days. Quantity:  2 Tab Refills:  0 phenol throat spray 1.4 % spray Commonly known as:  Veronica Baldy Dose:  1 Spray Take 1 Spray by mouth as needed for Sore throat. Quantity:  1 Bottle Refills:  0 CONTINUE these medications which have NOT CHANGED Dose & Instructions Dispensing Information Comments  
 allopurinol 300 mg tablet Commonly known as:  Eulis Aisha Dose:  150 mg Take 150 mg by mouth daily. Refills:  0  
   
 amLODIPine 2.5 mg tablet Commonly known as:  Dorys Kelvin TAKE 1 TABLET BY MOUTH EVERY MORNING Refills:  2 glimepiride 1 mg tablet Commonly known as:  AMARYL Refills:  0  
   
 levothyroxine 88 mcg tablet Commonly known as:  SYNTHROID  
 TAKE 1 TABLET BY MOUTH DAILY Refills:  1  
   
 lovastatin 40 mg tablet Commonly known as:  MEVACOR  
 TAKE 1 TABLET BY MOUTH EVERY DAY Refills:  1  
   
 metFORMIN  mg tablet Commonly known as:  GLUCOPHAGE XR Dose:  750 mg Take 750 mg by mouth daily. Refills:  0  
   
 metoprolol tartrate 50 mg tablet Commonly known as:  LOPRESSOR Take  by mouth two (2) times a day. Refills:  0  
   
 nystatin 100,000 unit/mL suspension Commonly known as:  MYCOSTATIN Dose:  10 mL Take 10 mL by mouth two (2) times a day. Refills:  0  
   
 VITAMIN B-12 1,000 mcg tablet Generic drug:  cyanocobalamin Dose:  1000 mcg Take 1,000 mcg by mouth daily. Refills:  0  
   
 VITAMIN D3 1,000 unit tablet Generic drug:  cholecalciferol Dose:  5000 Units Take 5,000 Units by mouth daily. Refills:  0  
   
 vitamin E 400 unit capsule Commonly known as:  [address] Take  by mouth daily. Refills:  0 STOP taking these medications Comments  
 indomethacin 50 mg capsule Commonly known as:  INDOCIN Current Immunizations Name Date Influenza Vaccine (Quad) PF 10/12/2018,  Deferred () Follow-up Information Follow up With Details Comments Contact Info Larry Veronica MD Schedule an appointment as soon as possible for a visit in 5 days  [address] [facility] [address] [phone] Tony Stubbs MD On 11/15/2018 at 9:20AM; Please bring Photo ID, Insurance Card & list of meds. [address] [place] [id number] Discharge Instructions Discharge Instructions Patient: Eze Puga MRN: [id number]  CSN: [id number] YOB: 1926  Age: 80 y.o. Sex: female DOA: 10/5/2018 LOS:  LOS: 7 days   Discharge Date: DIET:  Cardiac and Diabetic mechanical soft Diet with necator thick liquids ACTIVITY: Activity as tolerated Home health care for Skilled care for DM, Hypertension and medication management 
 
·    PT/OT consult ·             Speech consult ADDITIONAL INFORMATION: If you experience any of the following symptoms but not limited to Fever, chills, nausea, vomiting, diarrhea, change in mentation, falling, bleeding, shortness of breath, chest pain, please call your primary care physician or return to the emergency room if you cannot get hold of your doctor:  
 
FOLLOW UP CARE: 
Dr. Carolyne Zimmerman MD in 7-10 days. Please call and set up an appointment. Dr. Tess Meza GI in 4 week Kp Ruvalcaba MD 
10/12/2018 12:31 PM 
 
 
 
 
 
Chart Review Routing History No Routing History on File

## 2018-10-05 NOTE — H&P
History and Physical 
 
 
NAME:  Espinoza Carver :   1926 MRN:   [id number] Date/Time:  10/5/2018 CHIEF COMPLAINT: SOB HISTORY OF PRESENT ILLNESS:    
Ms. Ruddy Acosta is a 80 y.o.   female with a PMH of HTN, DM-2, hypothyroid, hyperlipidemia and gout who presents with c/c of shortness of breathing. She started to have cough, productive of whitish sputum associated with shortness of breathing. She denies fever or chills. She was recently seen by her PCP for ear pain and soarthroat. She denies orthopnea or PND. She has no leg edema. Her in ED she has Xray that showed Streaky opacity left lung base suspected for developing infiltrate. She was started on IV antibiotics and admitted for further evaluation and management.  
 
 
Past Medical History:  
Diagnosis Date  Diabetes ([address]  Gout  Hearing loss  Hypercholesterolemia  Hypertension  Nipple discharge Dark blood intermitten  Thyroid disease   
 hypothyroidism Past Surgical History:  
Procedure Laterality Date  HX CHOLECYSTECTOMY  HX COLONOSCOPY    
 HX TUMOR REMOVAL    
 side of abdomen Social History Substance Use Topics  Smoking status: Former Smoker  Smokeless tobacco: Former User Quit date:   Alcohol use No  
  
 
History reviewed. No pertinent family history. Allergies Allergen Reactions  Codeine Unknown (comments)  Erythromycin Other (comments) Pt unable to remember reaction Prior to Admission medications Medication Sig Start Date End Date Taking? Authorizing Provider  
nystatin (MYCOSTATIN) 100,000 unit/mL suspension Take 10 mL by mouth two (2) times a day. Yes Phys Luther, MD  
glimepiride (AMARYL) 1 mg tablet  3/15/18  Yes Historical Provider  
amLODIPine (NORVASC) 2.5 mg tablet TAKE 1 TABLET BY MOUTH EVERY MORNING 16  Yes Historical Provider levothyroxine (SYNTHROID) 88 mcg tablet TAKE 1 TABLET BY MOUTH DAILY 6/16/16  Yes Historical Provider  
allopurinol (ZYLOPRIM) 300 mg tablet Take 150 mg by mouth daily. Yes Historical Provider  
cyanocobalamin (VITAMIN B-12) 1,000 mcg tablet Take 1,000 mcg by mouth daily. Yes Historical Provider  
metoprolol tartrate (LOPRESSOR) 50 mg tablet Take  by mouth two (2) times a day. Yes Historical Provider  
vitamin E (AQUA GEMS) 400 unit capsule Take  by mouth daily. Historical Provider  
lovastatin (MEVACOR) 40 mg tablet TAKE 1 TABLET BY MOUTH EVERY DAY 5/17/16   Historical Provider  
metFORMIN ER (GLUCOPHAGE XR) 750 mg tablet Take 750 mg by mouth daily. Historical Provider  
cholecalciferol (VITAMIN D3) 1,000 unit tablet Take 5,000 Units by mouth daily. Historical Provider  
indomethacin (INDOCIN) 50 mg capsule Take  by mouth three (3) times daily as needed. Historical Provider REVIEW OF SYSTEMS:  
 
CONSTITUTIONAL: No Fever, No chills, No weight loss, No Night sweats HEENT:  No epistaxis, No diff in swallowing CVS: No chest pain, No palpitations, No syncope, No peripheral edema, No PND, No orthopnea RS: shortness of breath, No cough, No hemoptysis, No pleuritic chest pain GI: No abd pain, No vomitting, No diarrhea, No hematemesis, No rectal bleeding, No acid reflux or heartburn NEURO: No focal weakness, No headaches, No seizures PSYCH: No anxiety, No depression MUSCULOSKLETAL: No joint pain or swelling : No hematuria or dysuria SKIN: No rash Physical Exam: VITALS:   
Vital signs reviewed; most recent are: 
 
Visit Vitals  /66  Pulse 70  Temp 96.8 °F (36 °C)  Resp 25  SpO2 98% SpO2 Readings from Last 6 Encounters:  
10/05/18 98% 05/07/18 97% No intake or output data in the 24 hours ending 10/05/18 1351 GENERAL: Not in acute distress HEENT: pink conjunctiva, un icteric sclera, NECK: No lymphadenopthy or thyroid swelling, JVD not seen LYMPH: No supraclavicular or cervical or axillary nodes on both sides CVS: S1S2, No murmurs, No gallop or rub RS: CTA, No wheezing or crackles Abd: Soft, non tender, not distended, No guarding, No rigidity NEURO:  No focal neurologic deficits Extrm: no leg edema or swelling Skin: No rash Labs: 
Recent Results (from the past 24 hour(s)) CBC WITH AUTOMATED DIFF Collection Time: 10/05/18  7:10 AM  
Result Value Ref Range WBC 9.6 4.6 - 13.2 K/uL  
 RBC 4.54 4.20 - 5.30 M/uL  
 HGB 14.9 12.0 - 16.0 g/dL HCT 44.0 35.0 - 45.0 % MCV 96.9 74.0 - 97.0 FL  
 MCH 32.8 24.0 - 34.0 PG  
 MCHC 33.9 31.0 - 37.0 g/dL  
 RDW 14.2 11.6 - 14.5 % PLATELET 673 397 - 709 K/uL MPV 10.0 9.2 - 11.8 FL  
 NEUTROPHILS 58 40 - 73 % LYMPHOCYTES 32 21 - 52 % MONOCYTES 8 3 - 10 % EOSINOPHILS 1 0 - 5 % BASOPHILS 1 0 - 2 %  
 ABS. NEUTROPHILS 5.6 1.8 - 8.0 K/UL  
 ABS. LYMPHOCYTES 3.0 0.9 - 3.6 K/UL  
 ABS. MONOCYTES 0.8 0.05 - 1.2 K/UL  
 ABS. EOSINOPHILS 0.1 0.0 - 0.4 K/UL  
 ABS. BASOPHILS 0.1 0.0 - 0.1 K/UL  
 DF AUTOMATED METABOLIC PANEL, COMPREHENSIVE Collection Time: 10/05/18  7:10 AM  
Result Value Ref Range Sodium 140 136 - 145 mmol/L Potassium 4.5 3.5 - 5.5 mmol/L Chloride 109 (H) 100 - 108 mmol/L  
 CO2 19 (L) 21 - 32 mmol/L Anion gap 12 3.0 - 18 mmol/L Glucose 169 (H) 74 - 99 mg/dL BUN 28 (H) 7.0 - 18 MG/DL Creatinine 1.53 (H) 0.6 - 1.3 MG/DL  
 BUN/Creatinine ratio 18 12 - 20 GFR est AA 38 (L) >60 ml/min/1.73m2 GFR est non-AA 32 (L) >60 ml/min/1.73m2 Calcium 9.4 8.5 - 10.1 MG/DL Bilirubin, total 0.7 0.2 - 1.0 MG/DL  
 ALT (SGPT) 54 13 - 56 U/L  
 AST (SGOT) 33 15 - 37 U/L Alk. phosphatase 87 45 - 117 U/L Protein, total 7.5 6.4 - 8.2 g/dL Albumin 3.8 3.4 - 5.0 g/dL Globulin 3.7 2.0 - 4.0 g/dL A-G Ratio 1.0 0.8 - 1.7 NT-PRO BNP Collection Time: 10/05/18  7:10 AM  
Result Value Ref Range  NT pro- 0 - 1800 PG/ML  
 CARDIAC PANEL,(CK, CKMB & TROPONIN) Collection Time: 10/05/18  7:10 AM  
Result Value Ref Range CK 26 26 - 192 U/L  
 CK - MB <1.0 <3.6 ng/ml CK-MB Index  0.0 - 4.0 % CALCULATION NOT PERFORMED WHEN RESULT IS BELOW LINEAR LIMIT Troponin-I, Qt. <0.02 0.0 - 0.045 NG/ML  
POC LACTIC ACID Collection Time: 10/05/18  8:45 AM  
Result Value Ref Range Lactic Acid (POC) 1.2 0.4 - 2.0 mmol/L Active Problems: 
  Acute bronchitis (10/5/2018) SOB (shortness of breath) (10/5/2018) Community acquired pneumonia (10/5/2018) Assessment: 1. LLL pneumonia 2. HTN, controlled 3. DM-2, controlled 4. ZACKARY 5. Hypothyroid,  
6. Hyperlipidemia Plan:  
 
 
· Patient being admitted · Continue IV antibiotics with Levaquin · Continue PRN albuterol · Resume home mdications · Monitor blood glucose, SSI coverage and oral hypoglycemics. Hold metformin for now due to renal failure. Monitor renal function. · DVT prophylaxsis Total time:  64 minutes 
 
        
_______________________________________________________________________ Attending Physician:  Dwaine Acosta MD  
 
 
Copies: Anderson Rios MD

## 2018-10-05 NOTE — IP AVS SNAPSHOT
[address] Patient: Torrey Sher MRN: [id number] EMO:[id number] A check sindhu indicates which time of day the medication should be taken. My Medications START taking these medications Instructions Each Dose to Equal  
 Morning Noon Evening Bedtime  
 benzonatate 100 mg capsule Commonly known as:  TESSALON Take 1 Cap by mouth three (3) times daily as needed for Cough for up to 7 days. 100 mg  
    
   
   
   
  
 docusate sodium 100 mg capsule Commonly known as:  Cyndi Alberto Take 1 Cap by mouth two (2) times daily as needed for Constipation for up to 90 days. 100 mg  
    
   
   
   
  
 famotidine 20 mg tablet Commonly known as:  PEPCID Your next dose is:  10/12 8pm  
   
 Take 1 Tab by mouth two (2) times a day. 20 mg  
    
   
   
   
  
  
 guaiFENesin 100 mg/5 mL liquid Commonly known as:  ROBITUSSIN Take 5 mL by mouth every four (4) hours as needed for Cough. 100 mg Lactobacillus Acidoph & Bulgar 1 million cell Tab tablet Commonly known as:  Preston Ling Your next dose is:  10/13 8pm  
   
 Take 2 Tabs by mouth two (2) times a day. 2 Tab  
    
   
   
   
  
  
 levoFLOXacin 750 mg tablet Commonly known as:  Mervin Du Start taking on:  10/13/2018 Take 1 Tab by mouth every fourty-eight (48) hours for 4 days. 750 mg  
    
  
   
   
   
  
 phenol throat spray 1.4 % spray Commonly known as:  [place] Take 1 [place] by mouth as needed for Sore throat. 1 Spray CONTINUE taking these medications Instructions Each Dose to Equal  
 Morning Noon Evening Bedtime  
 allopurinol 300 mg tablet Commonly known as:  Ruth Crane Your next dose is:  10/13 9am  
   
 Take 150 mg by mouth daily. 150 mg  
    
  
   
   
   
  
 amLODIPine 2.5 mg tablet Commonly known as:  Angelic Villasenor Your next dose is:  10/13 9am  
   
 TAKE 1 TABLET BY MOUTH EVERY MORNING  
     
  
   
   
   
  
 glimepiride 1 mg tablet Commonly known as:  AMARYL Your next dose is:  10/13 9am  
   
      
  
   
   
   
  
 levothyroxine 88 mcg tablet Commonly known as:  SYNTHROID Your next dose is:  10/13 7am  
   
 TAKE 1 TABLET BY MOUTH DAILY lovastatin 40 mg tablet Commonly known as:  MEVACOR Your next dose is:  10/13 9am  
   
 TAKE 1 TABLET BY MOUTH EVERY DAY  
     
  
   
   
   
  
 metFORMIN  mg tablet Commonly known as:  GLUCOPHAGE XR Your next dose is:  10/13 9am  
   
 Take 750 mg by mouth daily. 750 mg  
    
  
   
   
   
  
 metoprolol tartrate 50 mg tablet Commonly known as:  LOPRESSOR Your next dose is:  10/12 8pm  
   
 Take  by mouth two (2) times a day. nystatin 100,000 unit/mL suspension Commonly known as:  MYCOSTATIN Your next dose is:  10/12 8pm  
   
 Take 10 mL by mouth two (2) times a day. 10 mL  
    
   
   
   
  
  
 VITAMIN B-12 1,000 mcg tablet Generic drug:  cyanocobalamin Your next dose is:  10/13 9am  
   
 Take 1,000 mcg by mouth daily. 1000 mcg VITAMIN D3 1,000 unit tablet Generic drug:  cholecalciferol Your next dose is:  10/13 9am  
   
 Take 5,000 Units by mouth daily. 5000 Units  
    
   
   
   
  
 vitamin E 400 unit capsule Commonly known as:  [address] Your next dose is:  10/13 9am  
   
 Take  by mouth daily. STOP taking these medications   
 indomethacin 50 mg capsule Commonly known as:  INDOCIN Where to Get Your Medications Information on where to get these meds will be given to you by the nurse or doctor. ! Ask your nurse or doctor about these medications  
  benzonatate 100 mg capsule  
 docusate sodium 100 mg capsule  
 famotidine 20 mg tablet  
 guaiFENesin 100 mg/5 mL liquid Lactobacillus Acidoph & Kimber[place] 1 million cell Tab tablet  
 levoFLOXacin 750 mg tablet  
 phenol throat spray 1.4 % spray

## 2018-10-05 NOTE — ED NOTES
Pt transported to floor on tele monitor by [facility], RN. Pt transported with labels and belongings

## 2018-10-05 NOTE — ED TRIAGE NOTES
Assumed care of patient, received report from [facility], [facility]. Pt awaiting chest XR and CT, NAD at this time.

## 2018-10-06 LAB
ANION GAP SERPL CALC-SCNC: 9 MMOL/L (ref 3–18)
BASOPHILS # BLD: 0 K/UL (ref 0–0.1)
BASOPHILS NFR BLD: 1 % (ref 0–2)
BUN SERPL-MCNC: 17 MG/DL (ref 7–18)
BUN/CREAT SERPL: 15 (ref 12–20)
CALCIUM SERPL-MCNC: 8.1 MG/DL (ref 8.5–10.1)
CHLORIDE SERPL-SCNC: 108 MMOL/L (ref 100–108)
CO2 SERPL-SCNC: 21 MMOL/L (ref 21–32)
CREAT SERPL-MCNC: 1.17 MG/DL (ref 0.6–1.3)
DIFFERENTIAL METHOD BLD: ABNORMAL
EOSINOPHIL # BLD: 0.1 K/UL (ref 0–0.4)
EOSINOPHIL NFR BLD: 2 % (ref 0–5)
ERYTHROCYTE [DISTWIDTH] IN BLOOD BY AUTOMATED COUNT: 14.1 % (ref 11.6–14.5)
GLUCOSE BLD STRIP.AUTO-MCNC: 104 MG/DL (ref 70–110)
GLUCOSE BLD STRIP.AUTO-MCNC: 120 MG/DL (ref 70–110)
GLUCOSE BLD STRIP.AUTO-MCNC: 140 MG/DL (ref 70–110)
GLUCOSE BLD STRIP.AUTO-MCNC: 159 MG/DL (ref 70–110)
GLUCOSE SERPL-MCNC: 107 MG/DL (ref 74–99)
HCT VFR BLD AUTO: 40.9 % (ref 35–45)
HGB BLD-MCNC: 13.5 G/DL (ref 12–16)
LYMPHOCYTES # BLD: 1.6 K/UL (ref 0.9–3.6)
LYMPHOCYTES NFR BLD: 25 % (ref 21–52)
MCH RBC QN AUTO: 32.1 PG (ref 24–34)
MCHC RBC AUTO-ENTMCNC: 33 G/DL (ref 31–37)
MCV RBC AUTO: 97.4 FL (ref 74–97)
MONOCYTES # BLD: 0.5 K/UL (ref 0.05–1.2)
MONOCYTES NFR BLD: 9 % (ref 3–10)
NEUTS SEG # BLD: 4.1 K/UL (ref 1.8–8)
NEUTS SEG NFR BLD: 63 % (ref 40–73)
PLATELET # BLD AUTO: 211 K/UL (ref 135–420)
PMV BLD AUTO: 10.2 FL (ref 9.2–11.8)
POTASSIUM SERPL-SCNC: 4 MMOL/L (ref 3.5–5.5)
RBC # BLD AUTO: 4.2 M/UL (ref 4.2–5.3)
SODIUM SERPL-SCNC: 138 MMOL/L (ref 136–145)
WBC # BLD AUTO: 6.4 K/UL (ref 4.6–13.2)

## 2018-10-06 PROCEDURE — 74011250637 HC RX REV CODE- 250/637: Performed by: INTERNAL MEDICINE

## 2018-10-06 PROCEDURE — 80048 BASIC METABOLIC PNL TOTAL CA: CPT | Performed by: INTERNAL MEDICINE

## 2018-10-06 PROCEDURE — 65660000000 HC RM CCU STEPDOWN

## 2018-10-06 PROCEDURE — 74011250636 HC RX REV CODE- 250/636: Performed by: EMERGENCY MEDICINE

## 2018-10-06 PROCEDURE — 93005 ELECTROCARDIOGRAM TRACING: CPT

## 2018-10-06 PROCEDURE — 36415 COLL VENOUS BLD VENIPUNCTURE: CPT | Performed by: INTERNAL MEDICINE

## 2018-10-06 PROCEDURE — 82962 GLUCOSE BLOOD TEST: CPT

## 2018-10-06 PROCEDURE — 74011000250 HC RX REV CODE- 250: Performed by: EMERGENCY MEDICINE

## 2018-10-06 PROCEDURE — 85025 COMPLETE CBC W/AUTO DIFF WBC: CPT | Performed by: INTERNAL MEDICINE

## 2018-10-06 PROCEDURE — 74011250636 HC RX REV CODE- 250/636: Performed by: INTERNAL MEDICINE

## 2018-10-06 PROCEDURE — 77030038269 HC DRN EXT URIN PURWCK BARD -A

## 2018-10-06 PROCEDURE — 77010033678 HC OXYGEN DAILY

## 2018-10-06 RX ADMIN — SODIUM CHLORIDE 75 ML/HR: 900 INJECTION, SOLUTION INTRAVENOUS at 04:22

## 2018-10-06 RX ADMIN — LEVOTHYROXINE SODIUM 88 MCG: 88 TABLET ORAL at 09:50

## 2018-10-06 RX ADMIN — GLIMEPIRIDE 1 MG: 2 TABLET ORAL at 09:48

## 2018-10-06 RX ADMIN — CEFTRIAXONE SODIUM 1 G: 1 INJECTION, POWDER, FOR SOLUTION INTRAMUSCULAR; INTRAVENOUS at 22:47

## 2018-10-06 RX ADMIN — METOPROLOL TARTRATE 50 MG: 50 TABLET ORAL at 09:50

## 2018-10-06 RX ADMIN — SODIUM CHLORIDE 75 ML/HR: 900 INJECTION, SOLUTION INTRAVENOUS at 19:23

## 2018-10-06 RX ADMIN — VITAMIN D, TAB 1000IU (100/BT) 5000 UNITS: 25 TAB at 09:46

## 2018-10-06 RX ADMIN — METOPROLOL TARTRATE 50 MG: 50 TABLET ORAL at 17:22

## 2018-10-06 RX ADMIN — AMLODIPINE BESYLATE 2.5 MG: 2.5 TABLET ORAL at 09:48

## 2018-10-06 RX ADMIN — CYANOCOBALAMIN TAB 1000 MCG 1000 MCG: 1000 TAB at 09:46

## 2018-10-06 RX ADMIN — ALLOPURINOL 150 MG: 100 TABLET ORAL at 09:49

## 2018-10-06 RX ADMIN — LOVASTATIN 40 MG: 20 TABLET ORAL at 09:46

## 2018-10-06 NOTE — PROGRESS NOTES
[facility] Hospitalist Group Progress Note Patient: Laney Avelar Age: 80 y.o. : 1926 MR#: [id number] SSN: [id number] Date/Time: 10/6/2018 Subjective:  
 
Patient lying in bed, c/o sore throat Assessment/Plan: 1- PNA 
2- ?bronchitis 3- HTN 4- DM2 
5- ZACKARY 6- Hypothyroidism PLAN Will stop levaquin in this 80year old Start  ceftriaxon 
chlorseptic throat spray ST eval 
Monitor renal function SSI 
D/w patient, full code Case discussed with:  [x]Patient  []Family  []Nursing  []Case Management DVT Prophylaxis:  []Lovenox  []Hep SQ  []SCDs  []Coumadin   []On Heparin gtt Objective:  
VS:  
Visit Vitals  /74 (BP 1 Location: Right arm, BP Patient Position: At rest)  Pulse 64  Temp 97 °F (36.1 °C)  Resp 22  
 Ht 5' 3\" (1.6 m)  Wt 61.7 kg (136 lb)  SpO2 98%  Breastfeeding No  
 BMI 24.09 kg/m2 Tmax/24hrs: Temp (24hrs), Av.6 °F (36.4 °C), Min:97 °F (36.1 °C), Max:98.6 °F (37 °C) Input/Output:  
Intake/Output Summary (Last 24 hours) at 10/06/18 1432 Last data filed at 10/06/18 6881 Gross per 24 hour Intake              860 ml Output              820 ml Net               40 ml General:  Awake, alert Cardiovascular:  S1S2+, RRR Pulmonary:  Coarse bs b/l GI:  Soft, BS+, NT, ND Extremities:  No edema Labs:   
Recent Results (from the past 24 hour(s)) GLUCOSE, POC Collection Time: 10/05/18  4:47 PM  
Result Value Ref Range Glucose (POC) 98 70 - 110 mg/dL GLUCOSE, POC Collection Time: 10/05/18  9:31 PM  
Result Value Ref Range Glucose (POC) 79 70 - 110 mg/dL GLUCOSE, POC Collection Time: 10/05/18 10:13 PM  
Result Value Ref Range Glucose (POC) 105 70 - 110 mg/dL METABOLIC PANEL, BASIC Collection Time: 10/06/18  2:09 AM  
Result Value Ref Range Sodium 138 136 - 145 mmol/L Potassium 4.0 3.5 - 5.5 mmol/L  Chloride 108 100 - 108 mmol/L  
 CO2 21 21 - 32 mmol/L  
 Anion gap 9 3.0 - 18 mmol/L Glucose 107 (H) 74 - 99 mg/dL BUN 17 7.0 - 18 MG/DL Creatinine 1.17 0.6 - 1.3 MG/DL  
 BUN/Creatinine ratio 15 12 - 20 GFR est AA 52 (L) >60 ml/min/1.73m2 GFR est non-AA 43 (L) >60 ml/min/1.73m2 Calcium 8.1 (L) 8.5 - 10.1 MG/DL  
CBC WITH AUTOMATED DIFF Collection Time: 10/06/18  2:09 AM  
Result Value Ref Range WBC 6.4 4.6 - 13.2 K/uL  
 RBC 4.20 4. 20 - 5.30 M/uL  
 HGB 13.5 12.0 - 16.0 g/dL HCT 40.9 35.0 - 45.0 % MCV 97.4 (H) 74.0 - 97.0 FL  
 MCH 32.1 24.0 - 34.0 PG  
 MCHC 33.0 31.0 - 37.0 g/dL  
 RDW 14.1 11.6 - 14.5 % PLATELET 678 210 - 575 K/uL MPV 10.2 9.2 - 11.8 FL  
 NEUTROPHILS 63 40 - 73 % LYMPHOCYTES 25 21 - 52 % MONOCYTES 9 3 - 10 % EOSINOPHILS 2 0 - 5 % BASOPHILS 1 0 - 2 %  
 ABS. NEUTROPHILS 4.1 1.8 - 8.0 K/UL  
 ABS. LYMPHOCYTES 1.6 0.9 - 3.6 K/UL  
 ABS. MONOCYTES 0.5 0.05 - 1.2 K/UL  
 ABS. EOSINOPHILS 0.1 0.0 - 0.4 K/UL  
 ABS. BASOPHILS 0.0 0.0 - 0.1 K/UL  
 DF AUTOMATED    
GLUCOSE, POC Collection Time: 10/06/18  8:41 AM  
Result Value Ref Range Glucose (POC) 159 (H) 70 - 110 mg/dL EKG, 12 LEAD, SUBSEQUENT Collection Time: 10/06/18  9:27 AM  
Result Value Ref Range Ventricular Rate 70 BPM  
 Atrial Rate 70 BPM  
 P-R Interval 150 ms QRS Duration 76 ms  
 Q-T Interval 416 ms  
 QTC Calculation (Bezet) 449 ms Calculated P Axis 28 degrees Calculated R Axis 7 degrees Calculated T Axis 44 degrees Diagnosis Normal sinus rhythm Low voltage QRS Nonspecific ST abnormality Abnormal ECG No previous ECGs available GLUCOSE, POC Collection Time: 10/06/18 11:26 AM  
Result Value Ref Range Glucose (POC) 140 (H) 70 - 110 mg/dL Additional Data Reviewed:   
 
Signed By: Carroll Skinner MD   
 October 6, 2018

## 2018-10-06 NOTE — ROUTINE PROCESS
Assumed patient care. Bedside shift report received from Santosh Falcon. Patient in bed, awake, alert and oriented x3. Assisted off the bedpan, had BM. Patient complained of difficulty swallowing, with dry productive cough, clear sputum. HOB elevated to 30 degrees for comfort and positioning. Call light and personal items placed in reach. Will notify doctor for above complaints. 8:57 PM - Noted new orders from Dr Mine Bauman. Patient notified and updated. 7:26 AM -Bedside shift change report given to Rosana Sanford (oncoming nurse) by Nestor Curran RN (offgoing nurse). Report given with SBAR, Kardex, Intake/Output, MAR and Recent Results.

## 2018-10-06 NOTE — PROGRESS NOTES
Bedside shift change report given to this RN (oncoming nurse) by Edelmira Akins RN (offgoing nurse). Report included the following information SBAR, Kardex and Cardiac Rhythm NSR.

## 2018-10-06 NOTE — PROGRESS NOTES
Bedside report received on pt while her son was present in the room, pt is apert and oriented, she denies nay pain and in no apparent discomfort. Telemetry monitor administered to pt. 
2136: POC 79, applesauce and applesauce administered. 2218: Pt with poor appetite, ate 3 spoons of applesauce and applesauce, rechecked POC glucose is 105. Will continue to monitor. Bedside shift change report given to Andre Pike RN (oncoming nurse) by Zheng Ascencio RN (offgoing nurse). Report included the following information SBAR, ED Summary, Intake/Output, MAR, Recent Results and Cardiac Rhythm NSR.

## 2018-10-07 LAB
ANION GAP SERPL CALC-SCNC: 8 MMOL/L (ref 3–18)
ATRIAL RATE: 70 BPM
BASOPHILS # BLD: 0 K/UL (ref 0–0.1)
BASOPHILS NFR BLD: 1 % (ref 0–2)
BUN SERPL-MCNC: 14 MG/DL (ref 7–18)
BUN/CREAT SERPL: 12 (ref 12–20)
CALCIUM SERPL-MCNC: 8.3 MG/DL (ref 8.5–10.1)
CALCULATED P AXIS, ECG09: 28 DEGREES
CALCULATED R AXIS, ECG10: 7 DEGREES
CALCULATED T AXIS, ECG11: 44 DEGREES
CHLORIDE SERPL-SCNC: 109 MMOL/L (ref 100–108)
CO2 SERPL-SCNC: 23 MMOL/L (ref 21–32)
CREAT SERPL-MCNC: 1.16 MG/DL (ref 0.6–1.3)
DIAGNOSIS, 93000: NORMAL
DIFFERENTIAL METHOD BLD: ABNORMAL
EOSINOPHIL # BLD: 0.2 K/UL (ref 0–0.4)
EOSINOPHIL NFR BLD: 2 % (ref 0–5)
ERYTHROCYTE [DISTWIDTH] IN BLOOD BY AUTOMATED COUNT: 14.2 % (ref 11.6–14.5)
GLUCOSE BLD STRIP.AUTO-MCNC: 102 MG/DL (ref 70–110)
GLUCOSE BLD STRIP.AUTO-MCNC: 116 MG/DL (ref 70–110)
GLUCOSE BLD STRIP.AUTO-MCNC: 116 MG/DL (ref 70–110)
GLUCOSE BLD STRIP.AUTO-MCNC: 97 MG/DL (ref 70–110)
GLUCOSE SERPL-MCNC: 104 MG/DL (ref 74–99)
HCT VFR BLD AUTO: 42 % (ref 35–45)
HGB BLD-MCNC: 13.7 G/DL (ref 12–16)
LYMPHOCYTES # BLD: 2.1 K/UL (ref 0.9–3.6)
LYMPHOCYTES NFR BLD: 26 % (ref 21–52)
MCH RBC QN AUTO: 32.1 PG (ref 24–34)
MCHC RBC AUTO-ENTMCNC: 32.6 G/DL (ref 31–37)
MCV RBC AUTO: 98.4 FL (ref 74–97)
MONOCYTES # BLD: 0.7 K/UL (ref 0.05–1.2)
MONOCYTES NFR BLD: 9 % (ref 3–10)
NEUTS SEG # BLD: 5 K/UL (ref 1.8–8)
NEUTS SEG NFR BLD: 62 % (ref 40–73)
P-R INTERVAL, ECG05: 150 MS
PLATELET # BLD AUTO: 217 K/UL (ref 135–420)
PMV BLD AUTO: 10.2 FL (ref 9.2–11.8)
POTASSIUM SERPL-SCNC: 4.2 MMOL/L (ref 3.5–5.5)
Q-T INTERVAL, ECG07: 416 MS
QRS DURATION, ECG06: 76 MS
QTC CALCULATION (BEZET), ECG08: 449 MS
RBC # BLD AUTO: 4.27 M/UL (ref 4.2–5.3)
SODIUM SERPL-SCNC: 140 MMOL/L (ref 136–145)
VENTRICULAR RATE, ECG03: 70 BPM
WBC # BLD AUTO: 8.1 K/UL (ref 4.6–13.2)

## 2018-10-07 PROCEDURE — 80048 BASIC METABOLIC PNL TOTAL CA: CPT | Performed by: EMERGENCY MEDICINE

## 2018-10-07 PROCEDURE — 77010033678 HC OXYGEN DAILY

## 2018-10-07 PROCEDURE — 82962 GLUCOSE BLOOD TEST: CPT

## 2018-10-07 PROCEDURE — 74011250636 HC RX REV CODE- 250/636: Performed by: INTERNAL MEDICINE

## 2018-10-07 PROCEDURE — 74011250636 HC RX REV CODE- 250/636: Performed by: EMERGENCY MEDICINE

## 2018-10-07 PROCEDURE — 74011000250 HC RX REV CODE- 250: Performed by: EMERGENCY MEDICINE

## 2018-10-07 PROCEDURE — 92610 EVALUATE SWALLOWING FUNCTION: CPT

## 2018-10-07 PROCEDURE — 74011250637 HC RX REV CODE- 250/637: Performed by: INTERNAL MEDICINE

## 2018-10-07 PROCEDURE — 77030027138 HC INCENT SPIROMETER -A

## 2018-10-07 PROCEDURE — 85025 COMPLETE CBC W/AUTO DIFF WBC: CPT | Performed by: EMERGENCY MEDICINE

## 2018-10-07 PROCEDURE — 36415 COLL VENOUS BLD VENIPUNCTURE: CPT | Performed by: EMERGENCY MEDICINE

## 2018-10-07 PROCEDURE — 65660000000 HC RM CCU STEPDOWN

## 2018-10-07 PROCEDURE — 74011250637 HC RX REV CODE- 250/637: Performed by: EMERGENCY MEDICINE

## 2018-10-07 PROCEDURE — 92526 ORAL FUNCTION THERAPY: CPT

## 2018-10-07 RX ORDER — UREA 10 %
2 LOTION (ML) TOPICAL 2 TIMES DAILY
Status: DISCONTINUED | OUTPATIENT
Start: 2018-10-07 | End: 2018-10-12 | Stop reason: HOSPADM

## 2018-10-07 RX ADMIN — METOPROLOL TARTRATE 50 MG: 50 TABLET ORAL at 08:53

## 2018-10-07 RX ADMIN — CYANOCOBALAMIN TAB 1000 MCG 1000 MCG: 1000 TAB at 08:52

## 2018-10-07 RX ADMIN — AMLODIPINE BESYLATE 2.5 MG: 2.5 TABLET ORAL at 08:51

## 2018-10-07 RX ADMIN — ALLOPURINOL 150 MG: 100 TABLET ORAL at 08:50

## 2018-10-07 RX ADMIN — METOPROLOL TARTRATE 50 MG: 50 TABLET ORAL at 17:36

## 2018-10-07 RX ADMIN — LACTOBACILLUS TAB 2 TABLET: TAB at 17:36

## 2018-10-07 RX ADMIN — VITAMIN D, TAB 1000IU (100/BT) 5000 UNITS: 25 TAB at 08:54

## 2018-10-07 RX ADMIN — LEVOTHYROXINE SODIUM 88 MCG: 88 TABLET ORAL at 08:51

## 2018-10-07 RX ADMIN — CEFTRIAXONE SODIUM 1 G: 1 INJECTION, POWDER, FOR SOLUTION INTRAMUSCULAR; INTRAVENOUS at 22:03

## 2018-10-07 RX ADMIN — SODIUM CHLORIDE 75 ML/HR: 900 INJECTION, SOLUTION INTRAVENOUS at 22:10

## 2018-10-07 RX ADMIN — LOVASTATIN 40 MG: 20 TABLET ORAL at 08:53

## 2018-10-07 NOTE — PROGRESS NOTES
Bedside shift change report given to this RN (oncoming nurse) by Sebas Wheat (offgoing nurse). Report included the following information SBAR, Kardex and Cardiac Rhythm NSR.

## 2018-10-07 NOTE — PROGRESS NOTES
Sputum specimen cup left in room with pt. Explained that we needed a sample, am giving pt opportunity to cough up spontaneously. Will address if not able to. Informed RN.

## 2018-10-07 NOTE — PROGRESS NOTES
Problem: Dysphagia (Adult) Goal: *Acute Goals and Plan of Care (Insert Text) Recommendations: 
Diet: Mechanicial soft, ground meat/thin liquid Meds: Per patient preference Aspiration Precautions Oral Care TID Other: Small sips/bites, alternate solid/liquid, MBSS next day Goals:  Patient will: 1. Tolerate PO trials with 0 s/s overt distress in 4/5 trials 2. Utilize compensatory swallow strategies/maneuvers (decrease bite/sip, size/rate, alt. liq/sol) with min cues in 4/5 trials 3. Perform oral-motor/laryngeal exercises to increase oropharyngeal swallow function with min cues 4. Complete an objective swallow study (i.e., MBSS) to assess swallow integrity, r/o aspiration, and determine of safest LRD, min A Outcome: Progressing Towards Goal 
 
Speech LAnguage Pathology bedside swallow  
evaluation & TREATMENT Patient: Kayleigh Henderson (20 y.o. female) Date: 10/7/2018 Primary Diagnosis: Community acquired pneumonia SOB (shortness of breath) Acute bronchitis Precautions: Aspiration PLOF: Independent ASSESSMENT : 
Based on the objective data described below, the patient presents with mild oral, moderate pharyngeal dysphagia. A&Ox4. Oral-motor exam revealed structures grossly intact for mastication and deglutition. Patient reports globus sensation and discomfort with swallow across all consistencies of food and liquid, states \"it gets hung up in my throat\". Patient observed self-feeding thin liquid + straw, puree and regular solid trials. Patient demo mildly delayed mastication with solids and delayed swallow initiation across all trials. 0 clinical s/sx of aspiration. Hyolaryngeal elevation decreased to palpation. Recommend mechanical soft, ground meats/thin liquid diet with aspiration precautions. MBSS to be completed next day. D/w RN.   
 
Skilled therapy initiated; Educated pt on aspiration precautions and importance of compensatory swallow techniques to decrease aspiration risk (decrease rate of intake & sip/bite size, upright @HOB for all po intake and ~30 minutes after po); verbalized comprehension. SLP will follow as indicated. Patient will benefit from skilled intervention to address the above impairments. Patients rehabilitation potential is considered to be Good Factors which may influence rehabilitation potential include:  
[x]            None noted []            Mental ability/status []            Medical condition []            Home/family situation and support systems 
[]            Safety awareness 
[]            Pain tolerance/management []            Other: PLAN : 
Recommendations and Planned Interventions: 
[facility] soft, thin liquid Frequency/Duration: Patient will be followed by speech-language pathology 1-2 times per day/4-7 days per week to address goals. Discharge Recommendations: To Be Determined SUBJECTIVE:  
Patient stated It gets hung up in my throat. OBJECTIVE:  
 
Past Medical History:  
Diagnosis Date  Diabetes ([address]  Gout  Hearing loss  Hypercholesterolemia  Hypertension  Nipple discharge Dark blood intermitten  Thyroid disease   
 hypothyroidism Past Surgical History:  
Procedure Laterality Date  HX CHOLECYSTECTOMY  HX COLONOSCOPY    
 HX TUMOR REMOVAL    
 side of abdomen Prior Level of Function/Home Situation: Living with family Home Situation Home Environment: Private residence One/Two Story Residence: One story Living Alone: No 
Support Systems: Child(jaswinder) Patient Expects to be Discharged to[de-identified] Private residence Current DME Used/Available at Home: Cane, straight Diet prior to admission: Regular/thin Current Diet:  Mechanical soft/thin   
Cognitive and Communication Status: 
Neurologic State: Alert Orientation Level: Oriented X4 Cognition: Appropriate for age attention/concentration, Follows commands Perception: Appears intact Perseveration: No perseveration noted Safety/Judgement: Good awareness of safety precautions Oral Assessment: 
Oral Assessment Labial: No impairment Dentition: Intact Oral Hygiene: Good Lingual: No impairment Velum: Unable to visualize Mandible: No impairment P.O. Trials: 
Patient Position: KFQ 72 Vocal quality prior to P.O.: No impairment Consistency Presented: Thin liquid; Solid;Puree How Presented: Self-fed/presented;Straw;Successive swallows Bolus Acceptance: No impairment Bolus Formation/Control: No impairment Propulsion: No impairment Oral Residue: None Initiation of Swallow: Delayed (# of seconds) Laryngeal Elevation: Decreased Aspiration Signs/Symptoms: Weak cough Pharyngeal Phase Characteristics: Painful swallow; Foreign body sensation; Feeling of discomfort;Effortful swallow Effective Modifications: Small sips and bites; Alternate liquids/solids Cues for Modifications: Minimal 
  
 
Oral Phase Severity: Mild Pharyngeal Phase Severity : Moderate GCODESwallowing:  Swallow Current Status CK= 40-59%  Swallow Goal Status CI= 1-19% The severity rating is based on the following outcomes: EDITH Noms Swallow Level 4 Clinical Judgement PAIN: 
Start of Eval/Tx: 0 End of Eval/Tx: 0 After treatment:  
[]            Patient left in no apparent distress sitting up in chair 
[x]            Patient left in no apparent distress in bed 
[x]            Call bell left within reach [x]            Nursing notified 
[x]            Family present 
[]            Caregiver present 
[]            Bed alarm activated COMMUNICATION/EDUCATION:  
[x]            Safe swallowing guidelines; compensatory techniques. [x]            Patient/family have participated as able in goal setting and plan of care. [x]            Patient/family agree to work toward stated goals and plan of care. []            Patient understands intent and goals of therapy; neutral about participation. []            Patient unable to participate in goal setting/plan of care; educ ongoing with interdisciplinary staff [x]         Posted safety precautions in patient's room. Thank you for this referral. 
REY Brown Time Calculation: 25 mins Evaluation Time: 15 minutes Treatment Time: 10 minutes

## 2018-10-07 NOTE — PROGRESS NOTES
Problem: Falls - Risk of 
Goal: *Absence of Falls Document Jose Worthy Fall Risk and appropriate interventions in the flowsheet. Outcome: Progressing Towards Goal 
Fall Risk Interventions: 
Mobility Interventions: Assess mobility with egress test, Bed/chair exit alarm, OT consult for ADLs, Patient to call before getting OOB, PT Consult for mobility concerns, Strengthening exercises (ROM-active/passive), Utilize walker, cane, or other assistive device, Utilize gait belt for transfers/ambulation Medication Interventions: Evaluate medications/consider consulting pharmacy Elimination Interventions: Call light in reach, Patient to call for help with toileting needs History of Falls Interventions: Consult care management for discharge planning, Bed/chair exit alarm, Door open when patient unattended, Evaluate medications/consider consulting pharmacy, Room close to nurse's station

## 2018-10-07 NOTE — PROGRESS NOTES
[facility] Hospitalist Group Progress Note Patient: Ally Valerio Age: 80 y.o. : 1926 MR#: [id number] SSN: [id number] Date/Time: 10/7/2018 Subjective:  
 
Patient lying in bed in NAD, has generalized weakness Assessment/Plan: 1- PNA 
2- ?bronchitis 3- HTN 4- DM2 
5- ZACKARY 6- Hypothyroidism 7- Dysphagia- diet as per speech PLAN On ceftriaxone 
chlorseptic throat spray ST eval 
Monitor renal function SSI 
D/w patient Full code Await PT, OT 
D/w RN Tried to call son, left message Case discussed with:  [x]Patient  []Family  []Nursing  []Case Management DVT Prophylaxis:  []Lovenox  []Hep SQ  []SCDs  []Coumadin   []On Heparin gtt Objective:  
VS:  
Visit Vitals  /51 (BP 1 Location: Left arm, BP Patient Position: At rest)  Pulse 70  Temp 97.8 °F (36.6 °C)  Resp 20  
 Ht 5' 3\" (1.6 m)  Wt 62.1 kg (137 lb)  SpO2 98%  Breastfeeding No  
 BMI 24.27 kg/m2 Tmax/24hrs: Temp (24hrs), Av.6 °F (36.4 °C), Min:97.4 °F (36.3 °C), Max:97.8 °F (36.6 °C) Input/Output:  
 
Intake/Output Summary (Last 24 hours) at 10/07/18 1439 Last data filed at 10/07/18 8132 Gross per 24 hour Intake             1075 ml Output              850 ml Net              225 ml General:  Awake, follows commands Cardiovascular:  S1S2+, RRR Pulmonary:  CTA b/l GI:  Soft, BS+, NT, ND Extremities:  No edema Labs:   
Recent Results (from the past 24 hour(s)) GLUCOSE, POC Collection Time: 10/06/18  4:21 PM  
Result Value Ref Range Glucose (POC) 120 (H) 70 - 110 mg/dL GLUCOSE, POC Collection Time: 10/06/18  9:23 PM  
Result Value Ref Range Glucose (POC) 104 70 - 110 mg/dL CBC WITH AUTOMATED DIFF Collection Time: 10/07/18  2:38 AM  
Result Value Ref Range WBC 8.1 4.6 - 13.2 K/uL  
 RBC 4.27 4.20 - 5.30 M/uL  
 HGB 13.7 12.0 - 16.0 g/dL HCT 42.0 35.0 - 45.0 %  MCV 98.4 (H) 74.0 - 97.0 FL  
 MCH 32.1 24.0 - 34.0 PG  
 MCHC 32.6 31.0 - 37.0 g/dL  
 RDW 14.2 11.6 - 14.5 % PLATELET 143 649 - 344 K/uL MPV 10.2 9.2 - 11.8 FL  
 NEUTROPHILS 62 40 - 73 % LYMPHOCYTES 26 21 - 52 % MONOCYTES 9 3 - 10 % EOSINOPHILS 2 0 - 5 % BASOPHILS 1 0 - 2 %  
 ABS. NEUTROPHILS 5.0 1.8 - 8.0 K/UL  
 ABS. LYMPHOCYTES 2.1 0.9 - 3.6 K/UL  
 ABS. MONOCYTES 0.7 0.05 - 1.2 K/UL  
 ABS. EOSINOPHILS 0.2 0.0 - 0.4 K/UL  
 ABS. BASOPHILS 0.0 0.0 - 0.1 K/UL  
 DF AUTOMATED METABOLIC PANEL, BASIC Collection Time: 10/07/18  2:38 AM  
Result Value Ref Range Sodium 140 136 - 145 mmol/L Potassium 4.2 3.5 - 5.5 mmol/L Chloride 109 (H) 100 - 108 mmol/L  
 CO2 23 21 - 32 mmol/L Anion gap 8 3.0 - 18 mmol/L Glucose 104 (H) 74 - 99 mg/dL BUN 14 7.0 - 18 MG/DL Creatinine 1.16 0.6 - 1.3 MG/DL  
 BUN/Creatinine ratio 12 12 - 20 GFR est AA 53 (L) >60 ml/min/1.73m2 GFR est non-AA 44 (L) >60 ml/min/1.73m2 Calcium 8.3 (L) 8.5 - 10.1 MG/DL  
GLUCOSE, POC Collection Time: 10/07/18  8:37 AM  
Result Value Ref Range Glucose (POC) 116 (H) 70 - 110 mg/dL GLUCOSE, POC Collection Time: 10/07/18 12:11 PM  
Result Value Ref Range Glucose (POC) 116 (H) 70 - 110 mg/dL Additional Data Reviewed:   
 
Signed By: Shae Phillips MD   
 October 7, 2018

## 2018-10-07 NOTE — ROUTINE PROCESS
Assumed patient care. Bedside shift report received from [place]. Patient in bed, awake, alert and oriented x3. HOB elevated to a 30 degrees position. Patient remained with sore throat, throat spray given. Call light and personal items placed in reach.  
 
7:46 AM - Bedside shift change report given to Clay Ulloa (oncoming nurse) by Santos Serrano RN (offgoing nurse). Report given with SBAR, Kardex, Intake/Output, MAR and Recent Results.

## 2018-10-07 NOTE — PROGRESS NOTES
Respiratory Care Assessment for Bronchial hygiene or Lung Expansion Therapy Patient  Gerda Baptise     80 y.o.   female     10/6/2018  8:59 PM 
Patient Active Problem List  
Diagnosis Code  Bloody discharge from right nipple N64.52  
 Acute bronchitis J20.9  SOB (shortness of breath) R06.02  
 Community acquired pneumonia J18.9 ABG: 
Date:10/6/2018 No results found for: PH, PHI, PCO2, PCO2I, PO2, PO2I, HCO3, HCO3I, FIO2, FIO2I Chest X-ray: 
Date:10/6/2018 Results from Hospital Encounter encounter on 10/05/18 XR CHEST PORT Narrative INDICATION:  Shortness of breath. COMPARISON:  1/15. FINDINGS: A portable AP radiograph of the chest was obtained at 0745 hours. Cardiac silhouette and pulmonary vasculature are within normal limits. Arthrosclerosis. Streaky opacity left lung base may represent atelectasis or 
developing infiltrate. No acute osseous abnormality. Impression IMPRESSION: Streaky opacity left lung base may represent atelectasis or 
developing infiltrate. Lab Test: 
Date:10/6/2018 WBC:  
Lab Results Component Value Date/Time WBC 6.4 10/06/2018 02:09 AM  
HGB: Lab Results Component Value Date/Time HGB 13.5 10/06/2018 02:09 AM  
 PLTS: Lab Results Component Value Date/Time PLATELET 546 93/88/0642 02:09 AM  
 
 
SaO2%/flow:  
SpO2 Readings from Last 1 Encounters:  
10/06/18 99% Vital Signs:   Patient Vitals for the past 8 hrs: 
 Temp Pulse Resp BP SpO2  
10/06/18 1946 97.5 °F (36.4 °C) 60 20 121/67 99 % 10/06/18 1619 97.6 °F (36.4 °C) 67 20 138/77 99 % RA/O2 flow/device: NC First Inital Assesment:    
[x]Yes []No  
Reevaluation/Reassessment:   
[]Yes [x]No  
 
CHART REVIEW Points 0 X 1 X 2 X 3 X 4 X Points Pulmonary History Smoking History (1) none  Recent Smoking History <1 PPD  Recent Smoking History >1 PPD  Pulmonary Disease or Impairment  Severe Pulmonary Disease  1 Surgical History No Surgery  General Surgery  Lower Abdominal  Thoracic or Upper Abdominal  Thoracic & Pulmonary Disease  0 CXR Clear or not indicated  Chronic changes or CXR Pending  Infiltrate, atelectasis or pleural effusions  Infiltrates in more than 1lobe  Infiltrates +atelectasis +/or pleural effusions  2 PATIENT ASSESSMENT  
 0 X 1 X 2 X 3 X 4 X Points Respiratory Pattern Regular pattern RR 12-20  Increased RR 21-27   Mild Dyspnea at rest, irregular pattern RR 28-32  Moderate Dyspnea at rest, Use of accessory muscles, RR 33-36  Severe Dyspnea, Use of accessory muscles RR >36  1 Mental Status Alert Oriented cooperative  Confused, Follows commands  Lethargic, Does not follow commands  Obtunded  Unresponsive  0 Breath Sounds Clear  Decreased Unilaterally  Decreased Bilaterally  Mild Scattered wheezing or Crackles in bases  Severe Wheezing or rhonchi  0 Cough Strong dry NPC  Strong Productive  Weak NPC  Weak productive or weak with rhonchi  No cough or may require suctioning  0 Level of Activity Ambulatory  Ambulatory with assistance  Temporarily Non-ambulatory  Non-Ambulatory, able to position self  Unable to position self, confined to bed  2 Total Points/Score:   6 Specific Intervention Chart(PEP) Bronchial Hygiene/Secretion Clearance:   
[]EZPAP []Rotation bed with vibration []CPT with percussor []CPT via vest  
[x]Oscillastiang positive pressure expiratory device Lung Expansion:   
[]Incentive Spirometer w/RT visits []Incentive Spirometer w/nursing []EZPAP *Suctioning:   
[]Nasal Tracheal []Tracheal   
 *suctioning will be ordered and done PRN with an associated frequency such as QID/PRN based on score Other:   
Care Plan Level # Score Modality Frequency Comment Level 1 >17 - - Level 2 14-17 - - Level 3 10-13 - - Level 4 1-9 PEP BID   
 
BRONCHIAL HYGIENE SCORING AND FREQUENCY GUIDELINES Frequency Indications/Findings Level # Q4 ATC Copious secretions, SOB, unable to sleep 1 QID & PRN at night Moderate amounts of secretions 2  
TID or Q6 wa Small amounts of secretions and poor cough: recent history of secretions 3 BID or Q8 wa Unable to deep breathe and cough effectively 4 Comments:  - Respiratory Therapist: Mayito Alaniz, RT

## 2018-10-08 ENCOUNTER — APPOINTMENT (OUTPATIENT)
Dept: GENERAL RADIOLOGY | Age: 83
DRG: 202 | End: 2018-10-08
Attending: EMERGENCY MEDICINE
Payer: MEDICARE

## 2018-10-08 LAB
GLUCOSE BLD STRIP.AUTO-MCNC: 112 MG/DL (ref 70–110)
GLUCOSE BLD STRIP.AUTO-MCNC: 135 MG/DL (ref 70–110)
GLUCOSE BLD STRIP.AUTO-MCNC: 149 MG/DL (ref 70–110)
GLUCOSE BLD STRIP.AUTO-MCNC: 99 MG/DL (ref 70–110)

## 2018-10-08 PROCEDURE — 74011000250 HC RX REV CODE- 250: Performed by: EMERGENCY MEDICINE

## 2018-10-08 PROCEDURE — 74011250637 HC RX REV CODE- 250/637: Performed by: INTERNAL MEDICINE

## 2018-10-08 PROCEDURE — 97116 GAIT TRAINING THERAPY: CPT

## 2018-10-08 PROCEDURE — 92611 MOTION FLUOROSCOPY/SWALLOW: CPT

## 2018-10-08 PROCEDURE — 82962 GLUCOSE BLOOD TEST: CPT

## 2018-10-08 PROCEDURE — 74011250637 HC RX REV CODE- 250/637: Performed by: EMERGENCY MEDICINE

## 2018-10-08 PROCEDURE — 65660000000 HC RM CCU STEPDOWN

## 2018-10-08 PROCEDURE — 74011250636 HC RX REV CODE- 250/636: Performed by: EMERGENCY MEDICINE

## 2018-10-08 PROCEDURE — 74230 X-RAY XM SWLNG FUNCJ C+: CPT

## 2018-10-08 PROCEDURE — 74011000255 HC RX REV CODE- 255: Performed by: EMERGENCY MEDICINE

## 2018-10-08 PROCEDURE — 77030038269 HC DRN EXT URIN PURWCK BARD -A

## 2018-10-08 PROCEDURE — 97166 OT EVAL MOD COMPLEX 45 MIN: CPT

## 2018-10-08 PROCEDURE — 92526 ORAL FUNCTION THERAPY: CPT

## 2018-10-08 PROCEDURE — 97535 SELF CARE MNGMENT TRAINING: CPT

## 2018-10-08 PROCEDURE — 97162 PT EVAL MOD COMPLEX 30 MIN: CPT

## 2018-10-08 RX ORDER — DOCUSATE SODIUM 100 MG/1
100 CAPSULE, LIQUID FILLED ORAL 2 TIMES DAILY
Status: DISCONTINUED | OUTPATIENT
Start: 2018-10-08 | End: 2018-10-12 | Stop reason: HOSPADM

## 2018-10-08 RX ORDER — BISACODYL 5 MG
5 TABLET, DELAYED RELEASE (ENTERIC COATED) ORAL DAILY PRN
Status: DISCONTINUED | OUTPATIENT
Start: 2018-10-08 | End: 2018-10-12 | Stop reason: HOSPADM

## 2018-10-08 RX ORDER — FACIAL-BODY WIPES
10 EACH TOPICAL ONCE
Status: DISPENSED | OUTPATIENT
Start: 2018-10-08 | End: 2018-10-09

## 2018-10-08 RX ADMIN — BARIUM SULFATE 15 ML: 400 SUSPENSION ORAL at 11:00

## 2018-10-08 RX ADMIN — CEFTRIAXONE SODIUM 1 G: 1 INJECTION, POWDER, FOR SOLUTION INTRAMUSCULAR; INTRAVENOUS at 21:03

## 2018-10-08 RX ADMIN — LEVOTHYROXINE SODIUM 88 MCG: 88 TABLET ORAL at 08:37

## 2018-10-08 RX ADMIN — AMLODIPINE BESYLATE 2.5 MG: 2.5 TABLET ORAL at 08:37

## 2018-10-08 RX ADMIN — LACTOBACILLUS TAB 2 TABLET: TAB at 08:37

## 2018-10-08 RX ADMIN — METOPROLOL TARTRATE 50 MG: 50 TABLET ORAL at 08:36

## 2018-10-08 RX ADMIN — DOCUSATE SODIUM 100 MG: 100 CAPSULE, LIQUID FILLED ORAL at 21:03

## 2018-10-08 RX ADMIN — BARIUM SULFATE 75 ML: 400 SUSPENSION ORAL at 11:00

## 2018-10-08 RX ADMIN — CYANOCOBALAMIN TAB 1000 MCG 1000 MCG: 1000 TAB at 08:36

## 2018-10-08 RX ADMIN — METOPROLOL TARTRATE 50 MG: 50 TABLET ORAL at 18:00

## 2018-10-08 RX ADMIN — BARIUM SULFATE 30 G: 960 POWDER, FOR SUSPENSION ORAL at 11:00

## 2018-10-08 RX ADMIN — VITAMIN D, TAB 1000IU (100/BT) 5000 UNITS: 25 TAB at 08:37

## 2018-10-08 RX ADMIN — LACTOBACILLUS TAB 2 TABLET: TAB at 17:18

## 2018-10-08 RX ADMIN — BARIUM SULFATE 30 ML: 400 PASTE ORAL at 11:00

## 2018-10-08 RX ADMIN — LOVASTATIN 40 MG: 20 TABLET ORAL at 08:37

## 2018-10-08 RX ADMIN — ALLOPURINOL 150 MG: 100 TABLET ORAL at 08:38

## 2018-10-08 NOTE — PROGRESS NOTES
Problem: Mobility Impaired (Adult and Pediatric) Goal: *Acute Goals and Plan of Care (Insert Text) Physical Therapy Goals Initiated 10/8/2018 and to be accomplished within 7 day(s) 1. Patient will move from supine to sit and sit to supine  and scoot up and down in bed with supervision/set-up. 2.  Patient will transfer from bed to chair and chair to bed with supervision/set-up using the least restrictive device. 3.  Patient will perform sit to stand with supervision/set-up. 4.  Patient will ambulate with supervision/set-up for >100 feet with the least restrictive device. 5.  Patient will ascend/descend 2 stairs with 1-2 handrail(s) with minimal assistance/contact guard assist. 
Outcome: Progressing Towards Goal 
physical Therapy EVALUATION Patient: Tirso Rudd (63 y.o. female) Date: 10/8/2018 Primary Diagnosis: Community acquired pneumonia SOB (shortness of breath) Acute bronchitis Precautions: Fall, Aspiration OBJECTIVE/ASSESSMENT : 
Based on the objective data described below, the patient presents with impaired functional mobility including bed mobility, transfers, ambulation, and general activity tolerance following admission for acute bronchitis. Patient presented today sitting up in chair, alert and agreeable to PT evaluation. Patient transferred to standing without assistive device, required CGA for safety d/t initial LOB. Patient ambulated 30 ft in room with CGA for safety d/t fair balance with occasional path deviations. She completed independent toileting, then returned to bed per her request with SBA for sit to supine transfer. At conclusion of session, patient left resting comfortably in bed with call bell in reach, needs met, and nurse notified. Education: bed mobility, transfers, ambulation, safety awareness -- patient verbalized/demonstrated understanding Patient will benefit from skilled intervention to address the above impairments. Patients rehabilitation potential is considered to be Good Factors which may influence rehabilitation potential include:  
[]         None noted 
[]         Mental ability/status []         Medical condition 
[]         Home/family situation and support systems 
[]         Safety awareness 
[]         Pain tolerance/management 
[]         Other: PLAN : 
Recommendations and Planned Interventions: 
[x]           Bed Mobility Training             [x]    Neuromuscular Re-Education 
[x]           Transfer Training                   []    Orthotic/Prosthetic Training 
[x]           Gait Training                          []    Modalities [x]           Therapeutic Exercises          []    Edema Management/Control 
[x]           Therapeutic Activities            [x]    Patient and Family Training/Education 
[]           Other (comment): Frequency/Duration: Patient will be followed by physical therapy 1-2 times per day, 4-7 days per week to address goals. Discharge Recommendations: Home Health Further Equipment Recommendations for Discharge: N/A  
 
SUBJECTIVE:  
Patient stated I've been laying in bed a lot recently, which is why I'm so off balance.  OBJECTIVE DATA SUMMARY:  
 
Past Medical History:  
Diagnosis Date  Diabetes ([address]  Gout  Hearing loss  Hypercholesterolemia  Hypertension  Nipple discharge Dark blood intermitten  Thyroid disease   
 hypothyroidism Past Surgical History:  
Procedure Laterality Date  HX CHOLECYSTECTOMY  HX COLONOSCOPY    
 HX TUMOR REMOVAL    
 side of abdomen Barriers to Learning/Limitations: yes;  sensory deficits-hearing Compensate with: Visual Cues and Tactile Cues Prior Level of Function/Home Situation: Patient lives with son (who works during the day) in single story home. She was independent with all functional mobility PTA. Home Situation Home Environment: Private residence # Steps to Enter: 2 Rails to Enter:  Yes 
 Hand Rails : Bilateral 
One/Two Story Residence: One story Living Alone: No 
Support Systems: Family member(s) Patient Expects to be Discharged to[de-identified] Private residence Current DME Used/Available at Home: Cane, straight Tub or Shower Type: Tub/Shower combination Critical Behavior: 
Neurologic State: Alert Psychosocial 
Patient Behaviors: Calm; Cooperative Purposeful Interaction: Yes Pt Identified Daily Priority: Clinical issues (comment) Caritas Process: Nurture loving kindness Caring Interventions: Reassure Reassure: Therapeutic listening Strength:   
Strength: Generally decreased, functional (BLE) Tone & Sensation:  
Tone: Normal (BLE) Sensation: Intact (BLE) Range Of Motion: 
AROM: Within functional limits (BLE) Functional Mobility: 
Bed Mobility: 
Rolling: Contact guard assistance Supine to Sit: Minimum assistance Sit to Supine: Stand-by assistance Scooting: Stand-by assistance Transfers: 
Sit to Stand: Contact guard assistance Stand to Sit: Contact guard assistance Balance:  
Sitting: Impaired Sitting - Static: Good (unsupported) Sitting - Dynamic: Fair (occasional) Standing: Impaired; With support Standing - Static: Fair Standing - Dynamic : Fair Ambulation/Gait Training: 
Distance (ft): 30 Feet (ft) Assistive Device:  (None) Ambulation - Level of Assistance: Contact guard assistance;Stand-by assistance Base of Support: Center of gravity altered Speed/Yakelin: Fluctuations Pain: 
Pre session: 0/10 Post session: 0/10 Activity Tolerance:  
Fair Please refer to the flowsheet for vital signs taken during this treatment. After treatment:  
[] Patient left in no apparent distress sitting up in chair 
[] Patient left sitting on EOB [x] Patient left in no apparent distress in bed 
[] Patient declined to be OOB at this time due to 
[x] Call bell left within reach [x] Nursing notified(Cecille) [] Caregiver present 
[] Bed alarm activated 
[x] SCDs in place COMMUNICATION/EDUCATION:  
[x]         Fall prevention education was provided and the patient/caregiver indicated understanding. [x]         Patient/family have participated as able in goal setting and plan of care. [x]         Patient/family agree to work toward stated goals and plan of care. []         Patient understands intent and goals of therapy, but is neutral about his/her participation. []         Patient is unable to participate in goal setting and plan of care. Thank you for this referral. 
Jsoe Cruz Mojica Time Calculation: 23 mins Mobility [id number] Current  CJ= 20-39%   Goal  CI= 1-19%. The severity rating is based on the Level of Assistance required for Functional Mobility and ADLs.  
 
Eval Complexity: History: MEDIUM  Complexity : 1-2 comorbidities / personal factors will impact the outcome/ POC Exam:MEDIUM Complexity : 3 Standardized tests and measures addressing body structure, function, activity limitation and / or participation in recreation  Presentation: MEDIUM Complexity : Evolving with changing characteristics  Clinical Decision Making:Medium Complexity   Overall Complexity:MEDIUM

## 2018-10-08 NOTE — CONSULTS
[email or web address]  [phone]    GASTROENTEROLOGY CONSULT      Impression:   1. Dysphagia - MBS shows moderate laryngeal penetration of thin barium, other wise no aspiration, speech pathologist recommends further GI eval  2. PNA, ? bronchitis  3. HTN  4. DM  5. ZACKARY  6. Hypothyroidism      Plan:     1. Will consider EGD with dilation, patient is concerned about anesthesia risks, await full speech pathology report/discuss with attending. 2. Diet per speech   3. Continue current management      Chief Complaint: Dysphagia      HPI:  Rhoda Redding is a 80 y.o. female who I am being asked to see in consultation for an opinion regarding the above. She has a PMHx of HTN, DM, hypothyroid, hyperlipidemia and gout. She presented to the ER with SOB and productive cough. She complains of a sore throat beginning about 2 weeks ago which progressed to solid food dysphagia, she notes this has been worse with meat but now it occurs with every meal. She recalls remote history of acid reflux but no symptoms in years. She has had some early satiety, denies abdominal pain or change in her BMs. PMH:   Past Medical History:   Diagnosis Date    Diabetes ([address]     Gout     Hearing loss     Hypercholesterolemia     Hypertension     Nipple discharge     Dark blood intermitten    Thyroid disease     hypothyroidism       PSH:   Past Surgical History:   Procedure Laterality Date    HX CHOLECYSTECTOMY      HX COLONOSCOPY      HX TUMOR REMOVAL      side of abdomen       Social HX:   Social History     Social History    Marital status:      Spouse name: N/A    Number of children: N/A    Years of education: N/A     Occupational History    Not on file.      Social History Main Topics    Smoking status: Former Smoker    Smokeless tobacco: Former User     Quit date: 1987    Alcohol use No    Drug use: Not on file    Sexual activity: Not on file     Other Topics Concern    Not on file     Social History Narrative FHX:   History reviewed. No pertinent family history. Allergy:   Allergies   Allergen Reactions    Codeine Unknown (comments)    Erythromycin Other (comments)     Pt unable to remember reaction        Patient Active Problem List   Diagnosis Code    Bloody discharge from right nipple N64.52    Acute bronchitis J20.9    SOB (shortness of breath) R06.02    Community acquired pneumonia J18.9       Home Medications:     Prescriptions Prior to Admission   Medication Sig    nystatin (MYCOSTATIN) 100,000 unit/mL suspension Take 10 mL by mouth two (2) times a day.  glimepiride (AMARYL) 1 mg tablet     amLODIPine (NORVASC) 2.5 mg tablet TAKE 1 TABLET BY MOUTH EVERY MORNING    levothyroxine (SYNTHROID) 88 mcg tablet TAKE 1 TABLET BY MOUTH DAILY    allopurinol (ZYLOPRIM) 300 mg tablet Take 150 mg by mouth daily.  cyanocobalamin (VITAMIN B-12) 1,000 mcg tablet Take 1,000 mcg by mouth daily.  metoprolol tartrate (LOPRESSOR) 50 mg tablet Take  by mouth two (2) times a day.  vitamin E (AQUA GEMS) 400 unit capsule Take  by mouth daily.  lovastatin (MEVACOR) 40 mg tablet TAKE 1 TABLET BY MOUTH EVERY DAY    metFORMIN ER (GLUCOPHAGE XR) 750 mg tablet Take 750 mg by mouth daily.  cholecalciferol (VITAMIN D3) 1,000 unit tablet Take 5,000 Units by mouth daily.  indomethacin (INDOCIN) 50 mg capsule Take  by mouth three (3) times daily as needed. Review of Systems:     Constitutional: No fevers, chills, weight loss, fatigue. Skin: No rashes, pruritis, jaundice, ulcerations, erythema. HENT: No headaches, nosebleeds, sinus pressure, rhinorrhea, sore throat. Eyes: No visual changes, blurred vision, eye pain, photophobia, jaundice. Cardiovascular: No chest pain, heart palpitations. Respiratory: No cough, SOB, wheezing, chest discomfort, orthopnea. Gastrointestinal: Dysphagia   Genitourinary: No dysuria, bleeding, discharge, pyuria. Musculoskeletal: No weakness, arthralgias, wasting. Endo: No sweats. Heme: No bruising, easy bleeding. Allergies: As noted. Neurological: Cranial nerves intact. Alert and oriented. Gait not assessed. Psychiatric:  No anxiety, depression, hallucinations. Visit Vitals    /81 (BP 1 Location: Left arm, BP Patient Position: At rest)    Pulse 88    Temp 97.6 °F (36.4 °C)    Resp 18    Ht 5' 3\" (1.6 m)    Wt 65.5 kg (144 lb 4.8 oz)    SpO2 97%    Breastfeeding No    BMI 25.56 kg/m2       Physical Assessment:     constitutional: appearance: well developed, well nourished, normal habitus, no deformities, in no acute distress. skin: inspection: no rashes, ulcers, icterus or other lesions; no clubbing or telangiectasias. palpation: no induration or subcutaneos nodules. eyes: inspection: normal conjunctivae and lids; no jaundice pupils: normal  ENMT: mouth: normal oral mucosa,lips and gums; good dentition. oropharynx: normal tongue, hard and soft palate; posterior pharynx without erithema, exudate or lesions. neck: thyroid: normal size, consistency and position; no masses or tenderness. respiratory: effort: normal chest excursion; no intercostal retraction or accessory muscle use. cardiovascular: abdominal aorta: normal size and position; no bruits. palpation: PMI of normal size and position; normal rhythm; no thrill or murmurs. abdominal: abdomen: normal consistency; no tenderness or masses. hernias: no hernias appreciated. liver: normal size and consistency. spleen: not palpable. rectal: hemoccult/guaiac: not performed. musculoskeletal: digits and nails: no clubbing, cyanosis, petechiae or other inflammatory conditions. gait: not observed, resting in bed, head and neck: normal range of motion; no pain, crepitation or contracture. spine/ribs/pelvis: normal range of motion; no pain, deformity or contracture. neurologic: cranial nerves: II-XII normal.   psychiatric: judgement/insight: within normal limits.  memory: within normal limits for recent and remote events. mood and affect: no evidence of depression, anxiety or agitation. orientation: oriented to time, space and person. Basic Metabolic Profile   Recent Labs      10/07/18   0238   NA  140   K  4.2   CL  109*   CO2  23   BUN  14   GLU  104*   CA  8.3*         CBC w/Diff    Recent Labs      10/07/18   0238   WBC  8.1   RBC  4.27   HGB  13.7   HCT  42.0   MCV  98.4*   MCH  32.1   MCHC  32.6   RDW  14.2   PLT  217    Recent Labs      10/07/18   0238   GRANS  62   LYMPH  26   EOS  2        Hepatic Function   No results for input(s): ALB, TP, TBILI, GPT, SGOT, AP, AML, LPSE in the last 72 hours. No lab exists for component: DBILI     Coags   No results for input(s): PTP, INR, APTT in the last 72 hours. No lab exists for component: INREXT        LEAH Retana. [facility] [address] 1947, [address] [facility]  Cell: [phone]  [email or web address]

## 2018-10-08 NOTE — PROGRESS NOTES
PT orders received, chart reviewed. Pt unable to participate with PT due to: 
[]  Nausea/vomiting 
[]  Eating 
[]  Pain 
[]  Pt lethargic [x]  Off Unit Will f/u later as patient's schedule allows. Thank you.  
Sonali Ansari PT, DPT

## 2018-10-08 NOTE — PROGRESS NOTES
Bedside shift change report given to Ryan Gaona (oncoming nurse) by Chase Gorman (offgoing nurse). Report included the following information SBAR, Kardex and Cardiac Rhythm Sinus.

## 2018-10-08 NOTE — PROGRESS NOTES
Problem: Falls - Risk of 
Goal: *Absence of Falls Document Solomon Tyler Fall Risk and appropriate interventions in the flowsheet. Outcome: Progressing Towards Goal 
Fall Risk Interventions: 
Mobility Interventions: Assess mobility with egress test, Bed/chair exit alarm, Communicate number of staff needed for ambulation/transfer, Patient to call before getting OOB Medication Interventions: Assess postural VS orthostatic hypotension, Bed/chair exit alarm, Evaluate medications/consider consulting pharmacy, Patient to call before getting OOB, Teach patient to arise slowly Elimination Interventions: Bed/chair exit alarm, Call light in reach, Patient to call for help with toileting needs, Toilet paper/wipes in reach, Toileting schedule/hourly rounds History of Falls Interventions: Bed/chair exit alarm, Consult care management for discharge planning, Door open when patient unattended, Evaluate medications/consider consulting pharmacy, Investigate reason for fall, Room close to nurse's station

## 2018-10-08 NOTE — PROGRESS NOTES
MBS completed with recs of mech soft and nectar-thick liquids, meds crushed. Highly suggest GI consult to further assess esophageal motility. Full report to follow. Thank you for this referral. 
 
Marcus Meadows M.S. CCC-SLP/L Speech-Language Pathologist

## 2018-10-08 NOTE — NURSE NAVIGATOR
Nurse Navigator discussed PT recommendation of home health with patient. A list of home care providers was left with patient. Patient does not want to choose a home care company to provide services at this time.

## 2018-10-08 NOTE — PROGRESS NOTES
Problem: Self Care Deficits Care Plan (Adult) Goal: *Therapy Goal (Edit Goal, Insert Text) Occupational Therapy Goals Initiated 10/8/2018 within 7 day(s). 1.  Patient will perform lower body dressing with modified independence and no LOB while seated edge of bed/standing. 2.  Patient will perform upper body dressing with modified independence and no LOB while seated edge of bed. 3.  Patient will perform toilet transfers with modified independence and use of LRAD. 4.  Patient will perform all aspects of toileting with modified independence. 5.  Patient will participate in upper extremity therapeutic exercise/activities with modified independence for 8 minutes. 6.  Patient will perform simple grooming in stance with MI and Fair balance using LRAD for support as needed. Occupational Therapy EVALUATION Patient: Laney Avelar (48 y.o. female) Date: 10/8/2018 Primary Diagnosis: Community acquired pneumonia SOB (shortness of breath) Acute bronchitis Precautions:   Fall, Aspiration ASSESSMENT : 
Pt cleared for participation in OT evaluation by nursing. Pt arriving back to room by transport when OT entered. Pt required CGA while rolling to adjust bed linen and pure wick. Pt states she is incontinent of bladder and urinates when she moves. Pt reassured that she may urinate as needed with pure wick. Pt demonstrates decreased B shld flexion and 3-/5 B shld strength. Pt demo BUE elbow flx/ext WFL and 4-/5 strength. Pt performed supine>sit with min A and once seated required CGA for scooting edge of bed. Pt demonstrates decreased sitting balance requiring CGA to regain upright position following lateral lean to R. Pt performed doffing/donning socks with cross over method and one LOB requiring CGA. Pt agreed to sit in chair for lunch. Pt performed bed>arm chair stand step transfer with min A and fair balance. Pt's pure wick re-applied.  Pt performed hair care s/u. Pt educated on benefits of shower chair and grab bar in bathroom for safety after she reported a fall while drying feet. Pt does see, hesitant to accept OT services stating she ha s been fine until \"this happened. \" Pt sitting in chair at end of session with all needs in reach. Patient will benefit from skilled intervention to address the above impairments. Patients rehabilitation potential is considered to be Good Factors which may influence rehabilitation potential include:  
[]             None noted []             Mental ability/status []             Medical condition []             Home/family situation and support systems []             Safety awareness []             Pain tolerance/management 
[]             Other: PLAN : 
Recommendations and Planned Interventions: 
[]               Self Care Training                  []        Therapeutic Activities []               Functional Mobility Training    []        Cognitive Retraining 
[]               Therapeutic Exercises           []        Endurance Activities []               Balance Training                   []        Neuromuscular Re-Education []               Visual/Perceptual Training     []   Home Safety Training 
[]               Patient Education                 []        Family Training/Education []               Other (comment): Frequency/Duration: Patient will be followed by occupational therapy 1-2 times per day/4-7 days per week to address goals. Discharge Recommendations: Home Health Further Equipment Recommendations for Discharge: shower chair and rolling walker and grab bars Barriers to Learning/Limitations: yes;  sensory deficits-vision/hearing/speech Compensate with: visual, verbal, tactile, kinesthetic cues/model PATIENT COMPLEXITY Eval Complexity: History: MEDIUM Complexity : Expanded review of history including physical, cognitive and psychosocial  history ;  Examination: MEDIUM Complexity : 3-5 performance deficits relating to physical, cognitive , or psychosocial skils that result in activity limitations and / or participation restrictions; Decision Making:MEDIUM Complexity : Patient may present with comorbidities that affect occupational performnce. Miniml to moderate modification of tasks or assistance (eg, physical or verbal ) with assesment(s) is necessary to enable patient to complete evaluation  Assessment: moderate Complexity G-CODES:  
 
Self Care  Current  CJ= 20-39%  Goal  CI= 1-19%. The severity rating is based on the Level of Assistance required for Functional Mobility and ADLs. SUBJECTIVE:  
Patient stated I still drive and everything.  OBJECTIVE DATA SUMMARY:  
 
Past Medical History:  
Diagnosis Date  Diabetes ([facility] [address]  Gout  Hearing loss  Hypercholesterolemia  Hypertension  Nipple discharge Dark blood intermitten  Thyroid disease   
 hypothyroidism Past Surgical History:  
Procedure Laterality Date  HX CHOLECYSTECTOMY  HX COLONOSCOPY    
 HX TUMOR REMOVAL    
 side of abdomen Prior Level of Function/Home Situation: Pt reports MI in self care, driving, and performing IADLs MI Home Situation Home Environment: Private residence One/Two Story Residence: One story Living Alone: No (lives with son who works during the day) Support Systems: Child(jaswinder) Patient Expects to be Discharged to[de-identified] Private residence Current DME Used/Available at Home: Shower chair, Lucero [place], straight Tub or Shower Type: Tub/Shower combination 
[x]  Right hand dominant   []  Left hand dominant Cognitive/Behavioral Status: 
Neurologic State: Alert Orientation Level: Oriented X4 Cognition: Appropriate decision making; Appropriate for age attention/concentration; Follows commands Safety/Judgement: Fall prevention;Home safety Skin: intact BUEs Edema: none noted BUEs Coordination: Coordination: Within functional limits (BUEs) Fine Motor Skills-Upper: Left Intact; Right Intact Gross Motor Skills-Upper: Left Intact; Right Intact Balance: 
Sitting: With support; Impaired Sitting - Static: Fair (occasional) (R lateral lean ) Sitting - Dynamic: Fair (occasional) (R lateral lean ) Standing: With support; Impaired Standing - Static: Fair Strength: 
Strength: Generally decreased, functional (B shlds) Tone & Sensation: 
Tone: Normal (BUEs) Sensation: Intact (BUEs) Range of Motion: 
AROM: Within functional limits (BUEs) Functional Mobility and Transfers for ADLs: 
Bed Mobility: 
Rolling: Contact guard assistance Supine to Sit: Minimum assistance Transfers: 
 Bed to Chair: Minimum assistance ADL Assessment: 
Feeding: Modified independent Oral Facial Hygiene/Grooming: Setup Bathing: Minimum assistance Upper Body Dressing: Setup Lower Body Dressing: Contact guard assistance Toileting: Contact guard assistance ADL Intervention: 
 Cognitive Retraining Safety/Judgement: Fall prevention;Home safety Pain: 
Pt reports 0/10 pain or discomfort prior to treatment.   
Pt reports 0/10 pain or discomfort post treatment. Activity Tolerance:  
fair Please refer to the flowsheet for vital signs taken during this treatment. After treatment:  
[x] Patient left in no apparent distress sitting up in chair 
[] Patient left in no apparent distress in bed 
[x] Call bell left within reach [x] Nursing notified 
[] Caregiver present 
[] Bed alarm activated COMMUNICATION/EDUCATION:  
[x] Home safety education was provided and the patient/caregiver indicated understanding. [x] Patient/family have participated as able in goal setting and plan of care. [x] Patient/family agree to work toward stated goals and plan of care. [] Patient understands intent and goals of therapy, but is neutral about his/her participation. [] Patient is unable to participate in goal setting and plan of care. Thank you for this referral. 
Read Paramjit, OT Time Calculation: 38 mins

## 2018-10-08 NOTE — NURSE NAVIGATOR
Reason for Admission:    
 
Community Acquired pneumonia, SOB, Acute bronchitis RRAT Score:  8 Plan for utilizing home health: To be determined pending PT/OT evaluations and recommendations. Likelihood of Readmission:  Low Transition of Care Plan:                 
 
                 
Likelihood of Readmission:  Low Transition of Care Plan: To be determined pending PT/OT evaluations and recommendations. Patient preference is to return home. DME:  Patient reports that she has a cane for use at home. ADLs:  Patient reports that she was independent with ADLs prior to admission. Patient reports that her son lives with her. Patient reports her son works but she can call him if needed. Care Management Interventions PCP Verified by CM: Yes 
Palliative Care Criteria Met (RRAT>21 & CHF Dx)?: No 
Mode of Transport at Discharge: Other (see comment) (family ) Transition of Care Consult (CM Consult): Discharge Planning Physical Therapy Consult: Yes Occupational Therapy Consult: Yes Current Support Network: Lives with Caregiver Confirm Follow Up Transport: Family Plan discussed with Pt/Family/Caregiver: Yes Discharge Location Discharge Placement: Unable to determine at this time Important Message from [employer]" reviewed and explained with the patient and/or representative at bedside and signature was obtained. A signed copy provided to patient/representative. Original signed document placed in patient's chart.

## 2018-10-08 NOTE — PROGRESS NOTES
[facility] Patient: Magda Ruffcel Age: 80 y.o. : 1926 MR#: [id number] SSN: [id number] Date/Time: 10/8/2018 Subjective:  
 
Patient sitting in achair, c/o difficulty swalloing Assessment/Plan: 1- PNA 
2- ?bronchitis 3- HTN 4- DM2 
5- ZACKARY 6- Hypothyroidism 7- Dysphagia- diet as per speech PLAN On ceftriaxone 
chlorseptic throat spray ST eval noted GI consult Monitor renal function SSI Full code Bowel regimen PT, OT 
D/w patient Case discussed with:  [x]Patient  []Family  []Nursing  []Case Management DVT Prophylaxis:  []Lovenox  []Hep SQ  []SCDs  []Coumadin   []On Heparin gtt Objective:  
VS:  
Visit Vitals  /68  Pulse 69  Temp 97.6 °F (36.4 °C)  Resp 18  Ht 5' 3\" (1.6 m)  Wt 65.5 kg (144 lb 4.8 oz)  SpO2 97%  Breastfeeding No  
 BMI 25.56 kg/m2 Tmax/24hrs: Temp (24hrs), Av.6 °F (36.4 °C), Min:97.6 °F (36.4 °C), Max:97.6 °F (36.4 °C) Input/Output:  
 
Intake/Output Summary (Last 24 hours) at 10/08/18 1950 Last data filed at 10/08/18 1450 Gross per 24 hour Intake             1185 ml Output             1300 ml Net             -115 ml General:  Awake, alert Cardiovascular:  S1S2+, RRR Pulmonary:  CTA b/l GI:  Soft, BS+, NT, ND Extremities:  No edema Labs:   
Recent Results (from the past 24 hour(s)) GLUCOSE, POC Collection Time: 10/07/18  9:04 PM  
Result Value Ref Range Glucose (POC) 97 70 - 110 mg/dL GLUCOSE, POC Collection Time: 10/08/18  8:35 AM  
Result Value Ref Range Glucose (POC) 112 (H) 70 - 110 mg/dL GLUCOSE, POC Collection Time: 10/08/18 11:29 AM  
Result Value Ref Range Glucose (POC) 135 (H) 70 - 110 mg/dL GLUCOSE, POC Collection Time: 10/08/18  5:02 PM  
Result Value Ref Range Glucose (POC) 99 70 - 110 mg/dL Additional Data Reviewed:   
 
Signed By: Sudarshan Diehl MD   
 2018

## 2018-10-08 NOTE — PROGRESS NOTES
Problem: Dysphagia (Adult) Goal: *Acute Goals and Plan of Care (Insert Text) Recommendations: 
Diet: Mechanicial soft, ground meat/thin liquid with nectar-thick liquids Meds: Per patient preference Aspiration Precautions Oral Care TID Other: Small sips/bites, alternate solid/liquid, DOUBLE SWALLOW WITH ALL PO, GI consult Goals:  Patient will: 1. Tolerate PO trials with 0 s/s overt distress in 4/5 trials 2. Utilize compensatory swallow strategies/maneuvers (decrease bite/sip, size/rate, alt. liq/sol) with min cues in 4/5 trials 3. Perform oral-motor/laryngeal exercises to increase oropharyngeal swallow function with min cues 4. Complete an objective swallow study (i.e., MBSS) to assess swallow integrity, r/o aspiration, and determine of safest LRD, min A - met 10/8/18 Outcome: Progressing Towards Goal 
Speech Pathology Modified barium swallow Study/treatment Patient: Blaze Leyva (56 y.o. female) Date: 10/8/2018 Primary Diagnosis: Community acquired pneumonia SOB (shortness of breath) Acute bronchitis Precautions:   Fall, Aspiration ASSESSMENT : 
Based on the objective data described below, the patient presents with mild oral and mod pharyngoesophageal dysphagia c/b silent penetration to laryngeal vestibule post thin and 13 mm Ba pill with nectar-thick wash. Mod impairment in overall pharyngeal motility, including reduced opening of upper esophageal sphincter, i.e., impaired relaxation of cricopharyngeus, present during all trials with mod-sev vallecular and pyriform sinus residuals remaining post all PO presentations. Above stated pharyngeal residue resulted in silent penetration events. Pt unable to clear laryngeal penetrate with cough. Small sips/bites, multiple effortful swallows, and chin tuck ineffective at pharyngeal clearance.  Nectar-thick, honey-thick, puree, and solids were minimally improved as compared to other viscosities with no aspiration/penetration events; however, significant residue remained in pharyngeal cavity putting pt at risk for airway compromise throughout meals. Rec mech soft with nectar-thick liquids, aspiration precautions, oral care TID, 2-3 swallows per bite/sip, and meds in puree. Rec f/u with GI for further dysphagia management. Patient will benefit from skilled intervention to address the above impairments. Patients rehabilitation potential is considered to be Fair Factors which may influence rehabilitation potential include:  
[x]              Medical condition PLAN : 
Recommendations and Planned Interventions: See above Frequency/Duration: Patient will be followed by speech-language pathology 1-2 times per day/3-5 days per week to address goals. Discharge Recommendations: Janes Awad and To Be Determined SUBJECTIVE:  
Patient stated You can see that its so uncomfortable for me to eat. OBJECTIVE:  
 
Past Medical History:  
Diagnosis Date  Diabetes ([address]  Gout  Hearing loss  Hypercholesterolemia  Hypertension  Nipple discharge Dark blood intermitten  Thyroid disease   
 hypothyroidism Past Surgical History:  
Procedure Laterality Date  HX CHOLECYSTECTOMY  HX COLONOSCOPY    
 HX TUMOR REMOVAL    
 side of abdomen Prior Level of Function/Home Situation: private residence Home Situation Home Environment: Private residence # Steps to Enter: 2 Rails to Enter: Yes Hand Rails : Bilateral 
One/Two Story Residence: One story Living Alone: No 
Support Systems: Family member(s) Patient Expects to be Discharged to[de-identified] Private residence Current DME Used/Available at Home: Cane, straight Tub or Shower Type: Tub/Shower combination Diet prior to admission: mech soft with thin Current Diet:  mech soft with nectar-thick Radiologist:   
Film Views: Lateral;Fluoro Patient Position: 90 in fluoro chair Trial 1: Trial 2:  
Consistency Presented: Thin liquid (13 mm Ba pill with nectar-thick wash) Consistency Presented: Nectar thick liquid;Honey thick liquid;Puree; Solid How Presented: SLP-fed/presented;Cup/sip;Straw How Presented: SLP-fed/presented;Cup/sip;Spoon Bolus Acceptance: No impairment Bolus Acceptance: No impairment Bolus Formation/Control: No impairment:   Bolus Formation/Control: Impaired: Mastication Propulsion: No impairment Propulsion: No impairment Oral Residue: None Oral Residue: None Initiation of Swallow: No impairment Initiation of Swallow: Triggered at base of tongue Timing: No impairment Timing: No impairment Penetration: After swallow; To laryngeal vestibule;From residual Penetration: None (at risk) Aspiration/Timing:  (at high risk) Aspiration/Timing: No evidence of aspiration (at risk) Pharyngeal Clearance: Pyriform residue ;Greater than 50%; Vallecular residue Pharyngeal Clearance: Vallecular residue;Pyriform residue ;10-50% Attempted Modifications: Double swallow Attempted Modifications: Double swallow Effective Modifications: None Effective Modifications: Double swallow Cues for Modifications: Moderate Cues for Modifications: Moderate Decreased Tongue Base Retraction?: No 
Laryngeal Elevation: WFL (within functional limits) Aspiration/Penetration Score: 3 (Penetration/Visible residue-Contrast remains above the folds/cords, but is not cleared) Pharyngeal Symmetry: Not assessed Pharyngeal-Esophageal Segment: Suspected esophageal dysphagia; Decreased relaxation of upper esophageal segment Pharyngeal Dysfunction: Crico-pharyngeal dysfunction;Decreased strength Oral Phase Severity: Mild Pharyngeal Phase Severity: Moderate Treatment Performed Following Evaluation: 
Training and education provided related to anatomy/physiology of oropharyngeal swallow, diet recs, significance of thickened liquids, compensatory strategy use (small sips, alternating bites/sips, double swallow per bite/sip), and positioning. Pt able to verbalize understanding. Will continue to follow. GCODESwallowing:  Swallow Current Status CK= 40-59%  Swallow Goal Status CJ= 20-39% The severity rating is based on the following outcomes:   
8-point Penetration-Aspiration Scale: Score 3 Professional Judgment PAIN: 
Pt reports 0/10 pain or discomfort prior to MBS. Pt reports 0/10 pain or discomfort post MBS. COMMUNICATION/EDUCATION:  
[x]  Patient educated regarding MBS results and diet recommendations. [x]  Patient/family have participated as able in goal setting and plan of care. Thank you for this referral. 
 
Heath Barahona M.S. CCC-SLP/L Speech-Language Pathologist

## 2018-10-09 LAB
ANION GAP SERPL CALC-SCNC: 8 MMOL/L (ref 3–18)
BASOPHILS # BLD: 0.1 K/UL (ref 0–0.1)
BASOPHILS NFR BLD: 1 % (ref 0–2)
BUN SERPL-MCNC: 13 MG/DL (ref 7–18)
BUN/CREAT SERPL: 13 (ref 12–20)
CALCIUM SERPL-MCNC: 9 MG/DL (ref 8.5–10.1)
CHLORIDE SERPL-SCNC: 106 MMOL/L (ref 100–108)
CO2 SERPL-SCNC: 25 MMOL/L (ref 21–32)
CREAT SERPL-MCNC: 1.03 MG/DL (ref 0.6–1.3)
DIFFERENTIAL METHOD BLD: NORMAL
EOSINOPHIL # BLD: 0.2 K/UL (ref 0–0.4)
EOSINOPHIL NFR BLD: 2 % (ref 0–5)
ERYTHROCYTE [DISTWIDTH] IN BLOOD BY AUTOMATED COUNT: 13.9 % (ref 11.6–14.5)
GLUCOSE BLD STRIP.AUTO-MCNC: 113 MG/DL (ref 70–110)
GLUCOSE BLD STRIP.AUTO-MCNC: 124 MG/DL (ref 70–110)
GLUCOSE BLD STRIP.AUTO-MCNC: 137 MG/DL (ref 70–110)
GLUCOSE BLD STRIP.AUTO-MCNC: 161 MG/DL (ref 70–110)
GLUCOSE SERPL-MCNC: 113 MG/DL (ref 74–99)
HCT VFR BLD AUTO: 42.5 % (ref 35–45)
HGB BLD-MCNC: 14 G/DL (ref 12–16)
INR PPP: 1 (ref 0.8–1.2)
LYMPHOCYTES # BLD: 1.9 K/UL (ref 0.9–3.6)
LYMPHOCYTES NFR BLD: 24 % (ref 21–52)
MCH RBC QN AUTO: 32 PG (ref 24–34)
MCHC RBC AUTO-ENTMCNC: 32.9 G/DL (ref 31–37)
MCV RBC AUTO: 97 FL (ref 74–97)
MONOCYTES # BLD: 0.7 K/UL (ref 0.05–1.2)
MONOCYTES NFR BLD: 9 % (ref 3–10)
NEUTS SEG # BLD: 5.1 K/UL (ref 1.8–8)
NEUTS SEG NFR BLD: 64 % (ref 40–73)
PLATELET # BLD AUTO: 202 K/UL (ref 135–420)
PMV BLD AUTO: 10.3 FL (ref 9.2–11.8)
POTASSIUM SERPL-SCNC: 3.9 MMOL/L (ref 3.5–5.5)
PROTHROMBIN TIME: 13.2 SEC (ref 11.5–15.2)
RBC # BLD AUTO: 4.38 M/UL (ref 4.2–5.3)
SODIUM SERPL-SCNC: 139 MMOL/L (ref 136–145)
WBC # BLD AUTO: 7.9 K/UL (ref 4.6–13.2)

## 2018-10-09 PROCEDURE — 74011250636 HC RX REV CODE- 250/636: Performed by: INTERNAL MEDICINE

## 2018-10-09 PROCEDURE — 80048 BASIC METABOLIC PNL TOTAL CA: CPT | Performed by: EMERGENCY MEDICINE

## 2018-10-09 PROCEDURE — 74011250637 HC RX REV CODE- 250/637: Performed by: EMERGENCY MEDICINE

## 2018-10-09 PROCEDURE — 74011250636 HC RX REV CODE- 250/636: Performed by: EMERGENCY MEDICINE

## 2018-10-09 PROCEDURE — 74011000250 HC RX REV CODE- 250: Performed by: EMERGENCY MEDICINE

## 2018-10-09 PROCEDURE — 65660000000 HC RM CCU STEPDOWN

## 2018-10-09 PROCEDURE — 74011636637 HC RX REV CODE- 636/637: Performed by: INTERNAL MEDICINE

## 2018-10-09 PROCEDURE — 74011250637 HC RX REV CODE- 250/637: Performed by: INTERNAL MEDICINE

## 2018-10-09 PROCEDURE — 85610 PROTHROMBIN TIME: CPT | Performed by: EMERGENCY MEDICINE

## 2018-10-09 PROCEDURE — 92526 ORAL FUNCTION THERAPY: CPT

## 2018-10-09 PROCEDURE — 97110 THERAPEUTIC EXERCISES: CPT

## 2018-10-09 PROCEDURE — 97116 GAIT TRAINING THERAPY: CPT

## 2018-10-09 PROCEDURE — 82962 GLUCOSE BLOOD TEST: CPT

## 2018-10-09 PROCEDURE — 36415 COLL VENOUS BLD VENIPUNCTURE: CPT | Performed by: EMERGENCY MEDICINE

## 2018-10-09 PROCEDURE — 85025 COMPLETE CBC W/AUTO DIFF WBC: CPT | Performed by: EMERGENCY MEDICINE

## 2018-10-09 RX ORDER — BENZONATATE 100 MG/1
100 CAPSULE ORAL
Status: DISCONTINUED | OUTPATIENT
Start: 2018-10-09 | End: 2018-10-12 | Stop reason: HOSPADM

## 2018-10-09 RX ADMIN — VITAMIN D, TAB 1000IU (100/BT) 5000 UNITS: 25 TAB at 08:08

## 2018-10-09 RX ADMIN — LACTOBACILLUS TAB 2 TABLET: TAB at 08:09

## 2018-10-09 RX ADMIN — DOCUSATE SODIUM 100 MG: 100 CAPSULE, LIQUID FILLED ORAL at 17:13

## 2018-10-09 RX ADMIN — CEFTRIAXONE SODIUM 1 G: 1 INJECTION, POWDER, FOR SOLUTION INTRAMUSCULAR; INTRAVENOUS at 21:46

## 2018-10-09 RX ADMIN — CYANOCOBALAMIN TAB 1000 MCG 1000 MCG: 1000 TAB at 08:09

## 2018-10-09 RX ADMIN — METOPROLOL TARTRATE 50 MG: 50 TABLET ORAL at 17:12

## 2018-10-09 RX ADMIN — METOPROLOL TARTRATE 50 MG: 50 TABLET ORAL at 08:09

## 2018-10-09 RX ADMIN — LACTOBACILLUS TAB 2 TABLET: TAB at 17:12

## 2018-10-09 RX ADMIN — LEVOTHYROXINE SODIUM 88 MCG: 88 TABLET ORAL at 08:09

## 2018-10-09 RX ADMIN — AMLODIPINE BESYLATE 2.5 MG: 2.5 TABLET ORAL at 08:09

## 2018-10-09 RX ADMIN — LOVASTATIN 40 MG: 20 TABLET ORAL at 08:08

## 2018-10-09 RX ADMIN — SODIUM CHLORIDE 50 ML/HR: 900 INJECTION, SOLUTION INTRAVENOUS at 20:25

## 2018-10-09 RX ADMIN — INSULIN LISPRO 2 UNITS: 100 INJECTION, SOLUTION INTRAVENOUS; SUBCUTANEOUS at 21:47

## 2018-10-09 RX ADMIN — ALLOPURINOL 200 MG: 100 TABLET ORAL at 08:08

## 2018-10-09 RX ADMIN — ONDANSETRON HYDROCHLORIDE 4 MG: 2 INJECTION, SOLUTION INTRAMUSCULAR; INTRAVENOUS at 11:53

## 2018-10-09 RX ADMIN — DOCUSATE SODIUM 100 MG: 100 CAPSULE, LIQUID FILLED ORAL at 08:09

## 2018-10-09 NOTE — PROGRESS NOTES
[facility] Patient: Irving Bernard Age: 80 y.o. : 1926 MR#: [id number] SSN: [id number] Date/Time: 10/9/2018 Subjective:  
 
Patient sitting in bed in NAD, c/o difficulty swallowing, also c/o constipation Assessment/Plan: 1- PNA 
2- ?bronchitis 3- HTN 4- DM2 
5- ZACKARY 6- Hypothyroidism 7- Dysphagia- diet as per speech PLAN On ceftriaxone 
chlorseptic throat spray ST following GI following, ?egd Monitor renal function SSI Full code Bowel regimen, enema today PT, OT 
D/w patient Case discussed with:  [x]Patient  []Family  []Nursing  []Case Management DVT Prophylaxis:  []Lovenox  []Hep SQ  []SCDs  []Coumadin   []On Heparin gtt Objective:  
VS:  
Visit Vitals  /70 (BP 1 Location: Right arm, BP Patient Position: Sitting)  Pulse (!) 58  Temp 97.6 °F (36.4 °C)  Resp 19  
 Ht 5' 3\" (1.6 m)  Wt 63.1 kg (139 lb 3.2 oz)  SpO2 98%  Breastfeeding No  
 BMI 24.66 kg/m2 Tmax/24hrs: Temp (24hrs), Av.7 °F (36.5 °C), Min:97.6 °F (36.4 °C), Max:98 °F (36.7 °C) Input/Output:  
 
Intake/Output Summary (Last 24 hours) at 10/09/18 1243 Last data filed at 10/09/18 6050 Gross per 24 hour Intake              120 ml Output             1300 ml Net            -1180 ml General:  Awake, alert Cardiovascular:  S1S2+, RRR Pulmonary:  CTA b/l GI:  Soft, BS+, NT, ND Extremities:  No edema Labs:   
Recent Results (from the past 24 hour(s)) GLUCOSE, POC Collection Time: 10/08/18  5:02 PM  
Result Value Ref Range Glucose (POC) 99 70 - 110 mg/dL GLUCOSE, POC Collection Time: 10/08/18  8:24 PM  
Result Value Ref Range Glucose (POC) 149 (H) 70 - 110 mg/dL CBC WITH AUTOMATED DIFF Collection Time: 10/09/18  2:09 AM  
Result Value Ref Range WBC 7.9 4.6 - 13.2 K/uL  
 RBC 4.38 4.20 - 5.30 M/uL  
 HGB 14.0 12.0 - 16.0 g/dL HCT 42.5 35.0 - 45.0 % MCV 97.0 74.0 - 97.0 FL  
 MCH 32.0 24.0 - 34.0 PG  
 MCHC 32.9 31.0 - 37.0 g/dL  
 RDW 13.9 11.6 - 14.5 % PLATELET 962 599 - 396 K/uL MPV 10.3 9.2 - 11.8 FL  
 NEUTROPHILS 64 40 - 73 % LYMPHOCYTES 24 21 - 52 % MONOCYTES 9 3 - 10 % EOSINOPHILS 2 0 - 5 % BASOPHILS 1 0 - 2 %  
 ABS. NEUTROPHILS 5.1 1.8 - 8.0 K/UL  
 ABS. LYMPHOCYTES 1.9 0.9 - 3.6 K/UL  
 ABS. MONOCYTES 0.7 0.05 - 1.2 K/UL  
 ABS. EOSINOPHILS 0.2 0.0 - 0.4 K/UL  
 ABS. BASOPHILS 0.1 0.0 - 0.1 K/UL  
 DF AUTOMATED METABOLIC PANEL, BASIC Collection Time: 10/09/18  2:09 AM  
Result Value Ref Range Sodium 139 136 - 145 mmol/L Potassium 3.9 3.5 - 5.5 mmol/L Chloride 106 100 - 108 mmol/L  
 CO2 25 21 - 32 mmol/L Anion gap 8 3.0 - 18 mmol/L Glucose 113 (H) 74 - 99 mg/dL BUN 13 7.0 - 18 MG/DL Creatinine 1.03 0.6 - 1.3 MG/DL  
 BUN/Creatinine ratio 13 12 - 20 GFR est AA >60 >60 ml/min/1.73m2 GFR est non-AA 50 (L) >60 ml/min/1.73m2 Calcium 9.0 8.5 - 10.1 MG/DL PROTHROMBIN TIME + INR Collection Time: 10/09/18  2:09 AM  
Result Value Ref Range Prothrombin time 13.2 11.5 - 15.2 sec INR 1.0 0.8 - 1.2 GLUCOSE, POC Collection Time: 10/09/18  8:45 AM  
Result Value Ref Range Glucose (POC) 124 (H) 70 - 110 mg/dL GLUCOSE, POC Collection Time: 10/09/18 11:46 AM  
Result Value Ref Range Glucose (POC) 137 (H) 70 - 110 mg/dL Additional Data Reviewed:   
 
Signed By: Jossue Estrada MD   
 October 9, 2018

## 2018-10-09 NOTE — ROUTINE PROCESS
Bedside and Verbal shift change report given to [facility] (oncoming nurse) by Onita Mortimer 
 (offgoing nurse). Report included the following information SBAR, Kardex, Intake/Output and MAR.

## 2018-10-09 NOTE — PROGRESS NOTES
Problem: Dysphagia (Adult) Goal: *Acute Goals and Plan of Care (Insert Text) Recommendations: 
Diet: Mechanicial soft, ground meat/ with nectar-thick liquids Meds: Per patient preference Aspiration Precautions Oral Care TID Other: Small sips/bites, alternate solid/liquid, DOUBLE SWALLOW WITH ALL PO, GI consult Goals:  Patient will: 1. Tolerate PO trials with 0 s/s overt distress in 4/5 trials 2. Utilize compensatory swallow strategies/maneuvers (decrease bite/sip, size/rate, alt. liq/sol) with min cues in 4/5 trials 3. Perform oral-motor/laryngeal exercises to increase oropharyngeal swallow function with min cues 4. Complete an objective swallow study (i.e., MBSS) to assess swallow integrity, r/o aspiration, and determine of safest LRD, min A - met 10/8/18 Outcome: Progressing Towards Goal 
Speech language pathology dysphagia treatment Patient: Salome Warner (89 y.o. female) Date: 10/9/2018 Diagnosis: Community acquired pneumonia SOB (shortness of breath) Acute bronchitis <principal problem not specified> Precautions:  Fall, Aspiration ASSESSMENT: 
Swallow tx completed b/s. Pt awake, alert, with thin ginger ale b/s due to pt c/o nausea. Educated pt to results of MBS with decreased C-P opening resulting in mod- max valleculae and pyriform sinus residue with silent penetration after the swallow of thin, recs of nectar thick liquids, rationale for thickened liquids, overt s/sx aspiration, basic swallow physiology, compensatory swallow strategies. Compensatory strategies written on pt's wipe board. Pt verbalized understanding, required only min cues for strategies with trials cup sips nectar thick liquids. Pt without immediate overt s/sx aspiration following nectar when strategies utilized. Pt demo mildly delayed weak cough response following nectar thick without use of 2-3 swallows per presentation.   Rec: continue mechanical soft solids with NECTAR thick liquids, 2-3 swallows per presentation, aspiration precautions. SLP will continue to follow. Progression toward goals: 
[]         Improving appropriately and progressing toward goals [x]         Improving slowly and progressing toward goals 
[]         Not making progress toward goals and plan of care will be adjusted PLAN: 
Recommendations and Planned Interventions: 
 continue mechanical soft solids with NECTAR thick liquids, 2-3 swallows per presentation, aspiration precautions Patient continues to benefit from skilled intervention to address the above impairments. Continue treatment per established plan of care. Discharge Recommendations: To Be Determined SUBJECTIVE:  
Patient stated I cough a lot; sometimes I'm able to get something up. OBJECTIVE:  
Cognitive and Communication Status: 
Neurologic State: Alert Orientation Level: Oriented X4 Cognition: Follows commands Perception: Appears intact Perseveration: No perseveration noted Safety/Judgement: Fall prevention Dysphagia Treatment: 
Oral Assessment: 
Oral Assessment Labial: No impairment Dentition: Intact Oral Hygiene: good Lingual: No impairment Velum: No impairment Mandible: No impairment P.O. Trials: 
 Patient Position: [address] Vocal quality prior to P.O.: Hoarse Consistency Presented: Nectar thick liquid How Presented: Self-fed/presented, Cup/sip Bolus Acceptance: No impairment Bolus Formation/Control: No impairment Propulsion: No impairment Oral Residue: None Initiation of Swallow: Delayed (# of seconds) Laryngeal Elevation: Decreased Aspiration Signs/Symptoms: Change vocal quality, Weak cough Pharyngeal Phase Characteristics: Altered vocal quality, Suspected pharyngeal residue Effective Modifications: Double swallow, Small sips and bites Cues for Modifications: Minimal-moderate Oral Phase Severity: Mild Pharyngeal Phase Severity : Moderate PAIN: 
Start of Tx: 0 End of Tx: 0 After treatment:  
[x]              Patient left in no apparent distress sitting up in chair 
[]              Patient left in no apparent distress in bed 
[x]              Call bell left within reach [x]              Nursing notified 
[]              Family present 
[]              Caregiver present 
[]              Bed alarm activated COMMUNICATION/EDUCATION:  
[x] Aspiration precautions; swallow safety; compensatory techniques []        Patient unable to participate in education; education ongoing with staff [x]  Posted safety precautions in patient's room. [x] Oral-motor/laryngeal strengthening exercises Gemma Osler, MS, CCC/SLP Time Calculation: 35 mins

## 2018-10-09 NOTE — PROGRESS NOTES
[email or web address] 
[phone] Gastroenterology follow up-Progress note Impression: 1. Dysphagia - MBS shows moderate laryngeal penetration of thin barium, soft diet, speech following 2. PNA, ? bronchitis 3. HTN 4. DM 
5. ZACKARY 
6 Hypothyroidism Plan: 
1. Will see if any improvement with SLP before pursuing EGD 2. Soft diet per speech 3. Continue current management Chief Complaint: Dysphagia Subjective:  Notes she is coughing up phlegm, denies liquid dysphagia, some trouble with soft foods Eyes: conjunctiva normal, EOM normal  
Neck: ROM normal, supple and trachea normal  
Cardiovascular: heart normal, intact distal pulses, normal rate and regular rhythm Pulmonary/Chest Wall: breath sounds normal and effort normal  
Abdominal: appearance normal, bowel sounds normal and soft, non-acute, non-tender Patient Active Problem List  
Diagnosis Code  Bloody discharge from right nipple N64.52  
 Acute bronchitis J20.9  SOB (shortness of breath) R06.02  
 Community acquired pneumonia J18.9 Visit Vitals  /70 (BP 1 Location: Right arm, BP Patient Position: Sitting)  Pulse (!) 58  Temp 97.6 °F (36.4 °C)  Resp 19  
 Ht 5' 3\" (1.6 m)  Wt 63.1 kg (139 lb 3.2 oz)  SpO2 98%  Breastfeeding No  
 BMI 24.66 kg/m2 Intake/Output Summary (Last 24 hours) at 10/09/18 1604 Last data filed at 10/09/18 5908 Gross per 24 hour Intake                0 ml Output             1100 ml Net            -1100 ml CBC w/Diff Lab Results Component Value Date/Time WBC 7.9 10/09/2018 02:09 AM  
 RBC 4.38 10/09/2018 02:09 AM  
 HGB 14.0 10/09/2018 02:09 AM  
 HCT 42.5 10/09/2018 02:09 AM  
 MCV 97.0 10/09/2018 02:09 AM  
 MCH 32.0 10/09/2018 02:09 AM  
 MCHC 32.9 10/09/2018 02:09 AM  
 RDW 13.9 10/09/2018 02:09 AM  
  10/09/2018 02:09 AM  
 Lab Results Component Value Date/Time  GRANS 64 10/09/2018 02:09 AM  
 LYMPH 24 10/09/2018 02:09 AM  
 EOS 2 10/09/2018 02:09 AM  
 BASOS 1 10/09/2018 02:09 AM  
  
Basic Metabolic Profile Recent Labs 10/09/18 
 0209 NA  139  
K  3.9 CL  106 CO2  25 BUN  13  
CA  9.0 Hepatic Function Lab Results Component Value Date/Time ALB 3.8 10/05/2018 07:10 AM  
 TP 7.5 10/05/2018 07:10 AM  
 AP 87 10/05/2018 07:10 AM  
 Lab Results Component Value Date/Time SGOT 33 10/05/2018 07:10 AM  
  
 
 
[facility] Recent Labs 10/09/18 
 0209 PTP  13.2 INR  1.0 LEAH [facility]. [email or web address] Phone: [phone] Pager: [phone]

## 2018-10-09 NOTE — PROGRESS NOTES
Problem: Mobility Impaired (Adult and Pediatric) Goal: *Acute Goals and Plan of Care (Insert Text) Physical Therapy Goals Initiated 10/8/2018 and to be accomplished within 7 day(s) 1. Patient will move from supine to sit and sit to supine  and scoot up and down in bed with supervision/set-up. 2.  Patient will transfer from bed to chair and chair to bed with supervision/set-up using the least restrictive device. 3.  Patient will perform sit to stand with supervision/set-up. 4.  Patient will ambulate with supervision/set-up for >100 feet with the least restrictive device. 5.  Patient will ascend/descend 2 stairs with 1-2 handrail(s) with minimal assistance/contact guard assist.  
Outcome: Progressing Towards Goal 
physical Therapy TREATMENT Patient: Marco Tuttle (23 y.o. female) Date: 10/9/2018 Diagnosis: Community acquired pneumonia SOB (shortness of breath) Acute bronchitis <principal problem not specified> Precautions: Fall, Aspiration Chart, physical therapy assessment, plan of care and goals were reviewed. OBJECTIVE/ ASSESSMENT: 
Patient found supine in bed willing to work with PT. Pt sat at EOB then stood to RW. Upon attempt to stand, pt has LOB backward requiring min A to recover. Pt ambulated in room to bathroom then sat and urinated on toilet. Pt stood and ambulated to sink where she brushed her teeth in standing. Pt has no LOB in standing with SBA from this LPTA. Pt ambulated back to EOB where she performed seated therex before returned to supine. Pt would benefit from RW at DC to help maintain balance. Education: therex, gait. Progression toward goals: 
[]      Improving appropriately and progressing toward goals [x]      Improving slowly and progressing toward goals 
[]      Not making progress toward goals and plan of care will be adjusted PLAN: 
Patient continues to benefit from skilled intervention to address the above impairments. Continue treatment per established plan of care. Discharge Recommendations:  Home Health Further Equipment Recommendations for Discharge:  rolling walker SUBJECTIVE:  
Patient stated I am a little wobbly because I've been in bed.  OBJECTIVE DATA SUMMARY:  
Critical Behavior: 
Neurologic State: Alert Orientation Level: Oriented X4 Cognition: Follows commands Safety/Judgement: Fall prevention Functional Mobility Training: 
Bed Mobility: 
Supine to Sit: Stand-by assistance Sit to Supine: Stand-by assistance Transfers: 
Sit to Stand: Contact guard assistance;Minimum assistance Stand to Sit: Contact guard assistance Balance: 
Sitting: Impaired Sitting - Static: Good (unsupported) Sitting - Dynamic: Fair (occasional) Standing: Impaired; With support Standing - Static: Fair Standing - Dynamic : Fair Ambulation/Gait Training: 
Distance (ft): 30 Feet (ft) Assistive Device: Walker, rolling Ambulation - Level of Assistance: Contact guard assistance;Minimal assistance Gait Abnormalities: Decreased step clearance; Path deviations Base of Support: Center of gravity altered Speed/Yakelin: Slow Therapeutic Exercises:  
 
 
EXERCISE Sets Reps Active Active Assist  
Passive Self- assited ROM Comments Ankle Pumps    [] [] [] [] Quad Sets   [] [] [] [] Glut Sets   [] [] [] [] Short Arc Quads   [] [] [] [] Heel Slides   [] [] [] [] Straight Leg Raises   [] [] [] [] Hip Abd   [] [] [] [] Long Arc Quads 1 10 [x] [] [] [] Bilaterally Seated Marching 1 10 [] [] [] [] Bilaterally Seated Knee Flexion   [] [] [] []   
Standing Marching   [] [] [] []   
 
Pain: 
Pre tx pain: 0 Post tx pain: 0 Pain Scale 1: Numeric (0 - 10) Pain Intensity 1: 0 Activity Tolerance:  
Fair Please refer to the flowsheet for vital signs taken during this treatment. After treatment:  
[] Patient left in no apparent distress sitting up in chair [x] Patient left in no apparent distress in bed 
[x] Call bell left within reach 
[] Nursing notified 
[] Caregiver present 
[] Bed alarm activated 
[] SCDs applied 
[] Ice applied Nova Lopez PTA Time Calculation: 25 mins Mobility [id number] Current  CJ= 20-39%. The severity rating is based on the Level of Assistance required for Functional Mobility and ADLs. Mobility   Goal  CI= 1-19%. The severity rating is based on the Level of Assistance required for Functional Mobility and ADLs.

## 2018-10-10 LAB
GLUCOSE BLD STRIP.AUTO-MCNC: 107 MG/DL (ref 70–110)
GLUCOSE BLD STRIP.AUTO-MCNC: 113 MG/DL (ref 70–110)
GLUCOSE BLD STRIP.AUTO-MCNC: 119 MG/DL (ref 70–110)
GLUCOSE BLD STRIP.AUTO-MCNC: 122 MG/DL (ref 70–110)

## 2018-10-10 PROCEDURE — 97116 GAIT TRAINING THERAPY: CPT

## 2018-10-10 PROCEDURE — 97530 THERAPEUTIC ACTIVITIES: CPT

## 2018-10-10 PROCEDURE — 92526 ORAL FUNCTION THERAPY: CPT

## 2018-10-10 PROCEDURE — 74011250637 HC RX REV CODE- 250/637: Performed by: INTERNAL MEDICINE

## 2018-10-10 PROCEDURE — 97535 SELF CARE MNGMENT TRAINING: CPT

## 2018-10-10 PROCEDURE — 65660000000 HC RM CCU STEPDOWN

## 2018-10-10 PROCEDURE — 82962 GLUCOSE BLOOD TEST: CPT

## 2018-10-10 PROCEDURE — 74011000250 HC RX REV CODE- 250: Performed by: EMERGENCY MEDICINE

## 2018-10-10 PROCEDURE — 77030038269 HC DRN EXT URIN PURWCK BARD -A

## 2018-10-10 PROCEDURE — 74011250636 HC RX REV CODE- 250/636: Performed by: EMERGENCY MEDICINE

## 2018-10-10 PROCEDURE — 77010033678 HC OXYGEN DAILY

## 2018-10-10 PROCEDURE — 74011250637 HC RX REV CODE- 250/637: Performed by: EMERGENCY MEDICINE

## 2018-10-10 PROCEDURE — 94640 AIRWAY INHALATION TREATMENT: CPT

## 2018-10-10 RX ORDER — IPRATROPIUM BROMIDE AND ALBUTEROL SULFATE 2.5; .5 MG/3ML; MG/3ML
3 SOLUTION RESPIRATORY (INHALATION)
Status: DISCONTINUED | OUTPATIENT
Start: 2018-10-10 | End: 2018-10-12 | Stop reason: HOSPADM

## 2018-10-10 RX ADMIN — DOCUSATE SODIUM 100 MG: 100 CAPSULE, LIQUID FILLED ORAL at 08:49

## 2018-10-10 RX ADMIN — METOPROLOL TARTRATE 50 MG: 50 TABLET ORAL at 17:39

## 2018-10-10 RX ADMIN — CEFTRIAXONE SODIUM 1 G: 1 INJECTION, POWDER, FOR SOLUTION INTRAMUSCULAR; INTRAVENOUS at 22:12

## 2018-10-10 RX ADMIN — METOPROLOL TARTRATE 50 MG: 50 TABLET ORAL at 08:49

## 2018-10-10 RX ADMIN — LACTOBACILLUS TAB 2 TABLET: TAB at 08:49

## 2018-10-10 RX ADMIN — AMLODIPINE BESYLATE 2.5 MG: 2.5 TABLET ORAL at 08:49

## 2018-10-10 RX ADMIN — DOCUSATE SODIUM 100 MG: 100 CAPSULE, LIQUID FILLED ORAL at 17:39

## 2018-10-10 RX ADMIN — IPRATROPIUM BROMIDE AND ALBUTEROL SULFATE 3 ML: .5; 3 SOLUTION RESPIRATORY (INHALATION) at 20:08

## 2018-10-10 RX ADMIN — LOVASTATIN 40 MG: 20 TABLET ORAL at 08:49

## 2018-10-10 RX ADMIN — VITAMIN D, TAB 1000IU (100/BT) 5000 UNITS: 25 TAB at 08:48

## 2018-10-10 RX ADMIN — CYANOCOBALAMIN TAB 1000 MCG 1000 MCG: 1000 TAB at 08:49

## 2018-10-10 RX ADMIN — LEVOTHYROXINE SODIUM 88 MCG: 88 TABLET ORAL at 08:49

## 2018-10-10 RX ADMIN — LACTOBACILLUS TAB 2 TABLET: TAB at 17:39

## 2018-10-10 RX ADMIN — ALLOPURINOL 150 MG: 100 TABLET ORAL at 08:49

## 2018-10-10 NOTE — PROGRESS NOTES
Bedside shift change report given to this RN (oncoming nurse) by Jose Lewis (offgoing nurse). Report included the following information SBAR, Kardex and Cardiac Rhythm nsr. Pt A&Ox4, continues iv abx, bed alarm on, instructed to use call light when needs anything and will continue to monitor.

## 2018-10-10 NOTE — ROUTINE PROCESS
1930 Assumed care of patient from off going nurse. Patient resting in bed. No distress noted. Call bell within reach, siderails up x 3, bed in lowest position, and patient instructed to use call bell for assistance. Will continue to monitor. 2209 SSE given as ordered. Explained procedure to patient. Patient placed in BARRIENTOS position on L side. Patient tolerated 900 ml. Placed patient on [facility].  
 
2218 While on [facility] straining, patient had 11 beats of VT -168. Asymptomatic. Will notify MD. 
 
65 Dr. Ethyl Angelucci notified of event. No new orders obtained. Will continue to monitor patient. 6823 Bedside and Verbal shift change report given to [address] [place] [address] (oncoming nurse) by You Salazar RN(offgoing nurse). Report included the following information Kardex, Intake/Output, MAR, Recent Results and Cardiac Rhythm SR/11 beats VT tele box# [id number].

## 2018-10-10 NOTE — PROGRESS NOTES
[email or web address] 
[phone] Gastroenterology follow up-Progress note Impression: 1. Dysphagia - likely motility disorder, denies ondynophagia, tolerating soft diet well, denies pill dysphagia 2. PNA, ? Bronchitis - ?phlegm contributing to swallowing issues 3. HTN 4. DM 
5. ZACKARY 6. Hypothyroidism Plan: 1. Conservative management, will continue to monitor if no improvement with SLP will pursue EGD/dilate 2. Soft diet per speech 3. Continue current management Chief Complaint: Dysphagia Subjective:  Coughing up phlegm, feels this is always in her throat, denies liquid or pill dysphagia Eyes: conjunctiva normal, EOM normal  
Neck: ROM normal, supple and trachea normal  
Cardiovascular: heart normal, intact distal pulses, normal rate and regular rhythm Pulmonary/Chest Wall: breath sounds normal and effort normal  
Abdominal: appearance normal, bowel sounds normal and soft, non-acute, non-tender Patient Active Problem List  
Diagnosis Code  Bloody discharge from right nipple N64.52  
 Acute bronchitis J20.9  SOB (shortness of breath) R06.02  
 Community acquired pneumonia J18.9 Visit Vitals  /69 (BP 1 Location: Right arm, BP Patient Position: Sitting)  Pulse 65  Temp 97.5 °F (36.4 °C)  Resp 18  Ht 5' 3\" (1.6 m)  Wt 62.9 kg (138 lb 11.2 oz)  SpO2 97%  Breastfeeding No  
 BMI 24.57 kg/m2 Intake/Output Summary (Last 24 hours) at 10/10/18 1115 Last data filed at 10/10/18 4058 Gross per 24 hour Intake          1319.17 ml Output              900 ml Net           419.17 ml  
 
 
CBC w/Diff Lab Results Component Value Date/Time  WBC 7.9 10/09/2018 02:09 AM  
 RBC 4.38 10/09/2018 02:09 AM  
 HGB 14.0 10/09/2018 02:09 AM  
 HCT 42.5 10/09/2018 02:09 AM  
 MCV 97.0 10/09/2018 02:09 AM  
 MCH 32.0 10/09/2018 02:09 AM  
 MCHC 32.9 10/09/2018 02:09 AM  
 RDW 13.9 10/09/2018 02:09 AM  
  10/09/2018 02:09 AM  
 Lab Results Component Value Date/Time GRANS 64 10/09/2018 02:09 AM  
 LYMPH 24 10/09/2018 02:09 AM  
 EOS 2 10/09/2018 02:09 AM  
 BASOS 1 10/09/2018 02:09 AM  
  
Basic Metabolic Profile Recent Labs 10/09/18 
 0209 NA  139  
K  3.9 CL  106 CO2  25 BUN  13  
CA  9.0 Hepatic Function Lab Results Component Value Date/Time ALB 3.8 10/05/2018 07:10 AM  
 TP 7.5 10/05/2018 07:10 AM  
 AP 87 10/05/2018 07:10 AM  
 Lab Results Component Value Date/Time SGOT 33 10/05/2018 07:10 AM  
  
 
 
Coags Recent Labs 10/09/18 
 0209 PTP  13.2 INR  1.0 LEAH [facility]. [email or web address] Phone: [phone] Pager: [phone]

## 2018-10-10 NOTE — PROGRESS NOTES
Problem: Dysphagia (Adult) Goal: *Acute Goals and Plan of Care (Insert Text) Recommendations: 
Diet: Mechanicial soft, ground meat/ with nectar-thick liquids Meds: Per patient preference Aspiration Precautions Oral Care TID Other: Small sips/bites, alternate solid/liquid, DOUBLE SWALLOW WITH ALL PO, GI consult Goals:  Patient will: 1. Tolerate PO trials with 0 s/s overt distress in 4/5 trials 2. Utilize compensatory swallow strategies/maneuvers (decrease bite/sip, size/rate, alt. liq/sol) with min cues in 4/5 trials 3. Perform oral-motor/laryngeal exercises to increase oropharyngeal swallow function with min cues 4. Complete an objective swallow study (i.e., MBSS) to assess swallow integrity, r/o aspiration, and determine of safest LRD, min A - met 10/8/18 Outcome: Progressing Towards Goal 
Speech language pathology dysphagia treatment Patient: Davida Runner (20 y.o. female) Date: 10/10/2018 Diagnosis: Community acquired pneumonia SOB (shortness of breath) Acute bronchitis <principal problem not specified> Precautions:  Fall, Aspiration ASSESSMENT: 
Pt seen b/s for dysphagia tx. Pt able to recall general results of MBS and compensatory swallow strategies (written on wipe board in her room). Initiated [employer]. Pt able to complete 3 reps of Joey (10 up and down, 20 second hold). Pt attempted [place] x3, able to complete 1. Exercises written on wipe board in pt's room. Encouraged pt to complete exercises several times a day. Pt verbalized understanding and agreed. Rec: continue mechanical soft solids with NECTAR thick liquids, 2 swallows per presentation. SLP will continue to follow. Progression toward goals: 
[]         Improving appropriately and progressing toward goals [x]         Improving slowly and progressing toward goals 
[]         Not making progress toward goals and plan of care will be adjusted PLAN: 
 Recommendations and Planned Interventions: 
continue mechanical soft solids with NECTAR thick liquids, 2 swallows per presentation. Patient continues to benefit from skilled intervention to address the above impairments. Continue treatment per established plan of care. Discharge Recommendations:  Inpatient Rehab, [place] and To Be Determined SUBJECTIVE:  
Patient stated How many do you want me to do?  OBJECTIVE:  
Cognitive and Communication Status: 
Neurologic State: Alert Orientation Level: Oriented X4 Cognition: Follows commands Perception: Appears intact Perseveration: No perseveration noted Safety/Judgement: Fall prevention Dysphagia Treatment: 
Oral Assessment: 
Oral Assessment Labial: No impairment Dentition: Intact Oral Hygiene: good Lingual: No impairment Velum: No impairment Mandible: No impairment P.O. Trials: 
 Patient Position: [address] Vocal quality prior to P.O.: Hoarse Consistency Presented: Nectar thick liquid How Presented: Self-fed/presented, Cup/sip Bolus Acceptance: No impairment Bolus Formation/Control: No impairment Propulsion: No impairment Oral Residue: None Initiation of Swallow: Delayed (# of seconds) Laryngeal Elevation: Decreased Aspiration Signs/Symptoms: Change vocal quality, Weak cough Pharyngeal Phase Characteristics: Altered vocal quality, Suspected pharyngeal residue Effective Modifications: Double swallow, Small sips and bites Cues for Modifications: Minimal-moderate Oral Phase Severity: Mild Pharyngeal Phase Severity : Moderate Exercises: 
Laryngeal Exercises: 
  
 [place]: Yes Sets : 1 Reps : 5 (attempted) Shaker: Yes Sets : 3 Reps : 20 PAIN: 
Start of Tx: 0 End of Tx: 0 After treatment:  
[]              Patient left in no apparent distress sitting up in chair 
[x]              Patient left in no apparent distress in bed 
[x]              Call bell left within reach [x]              Nursing notified 
[]              Family present 
[]              Caregiver present 
[]              Bed alarm activated COMMUNICATION/EDUCATION:  
[x] Aspiration precautions; swallow safety; compensatory techniques []        Patient unable to participate in education; education ongoing with staff [x]  Posted safety precautions in patient's room. [x] Oral-motor/laryngeal strengthening exercises Gilmar Fuentes MS, CCC/SLP Time Calculation: 35 mins

## 2018-10-10 NOTE — PROGRESS NOTES
[facility] Hospitalist Group Progress Note Patient: Viv Morales Age: 80 y.o. : 1926 MR#: [id number] SSN: [id number] Date/Time: 10/10/2018 Subjective:  
 
Patient sitting in bed in NAD, awake, follows commands. Still has difficulty swallowing Assessment/Plan: 1- PNA 
2- ?bronchitis 3- HTN 4- DM2 
5- ZACKARY 6- Hypothyroidism 7- Dysphagia- diet as per speech PLAN On ceftriaxone 
chlorseptic throat spray ST following, diet as per speech Aspiration precautions GI following,  
Monitor renal function SSI Full code Bowel regimen, enema today PT, OT 
D/w patient Case discussed with:  [x]Patient  []Family  []Nursing  []Case Management DVT Prophylaxis:  []Lovenox  []Hep SQ  []SCDs  []Coumadin   []On Heparin gtt Objective:  
VS:  
Visit Vitals  /68 (BP 1 Location: Right arm, BP Patient Position: At rest)  Pulse 67  Temp 98.6 °F (37 °C)  Resp 18  Ht 5' 3\" (1.6 m)  Wt 62.9 kg (138 lb 11.2 oz)  SpO2 97%  Breastfeeding No  
 BMI 24.57 kg/m2 Tmax/24hrs: Temp (24hrs), Av.8 °F (36.6 °C), Min:97.4 °F (36.3 °C), Max:98.6 °F (37 °C) Input/Output:  
 
Intake/Output Summary (Last 24 hours) at 10/10/18 1610 Last data filed at 10/10/18 6711 Gross per 24 hour Intake          1319.17 ml Output              900 ml Net           419.17 ml General:  Awake, alert Cardiovascular:  S1S2+, RRR Pulmonary:  CTA b/l GI:  Soft, BS+, NT, ND Extremities:  No edema Labs:   
Recent Results (from the past 24 hour(s)) GLUCOSE, POC Collection Time: 10/09/18  4:42 PM  
Result Value Ref Range Glucose (POC) 113 (H) 70 - 110 mg/dL GLUCOSE, POC Collection Time: 10/09/18  9:12 PM  
Result Value Ref Range Glucose (POC) 161 (H) 70 - 110 mg/dL GLUCOSE, POC Collection Time: 10/10/18  8:16 AM  
Result Value Ref Range Glucose (POC) 119 (H) 70 - 110 mg/dL GLUCOSE, POC  Collection Time: 10/10/18 11:58 AM  
 Result Value Ref Range Glucose (POC) 122 (H) 70 - 110 mg/dL GLUCOSE, POC Collection Time: 10/10/18  3:45 PM  
Result Value Ref Range Glucose (POC) 113 (H) 70 - 110 mg/dL Additional Data Reviewed:   
 
Signed By: Benjamin Severin, MD   
 October 10, 2018

## 2018-10-10 NOTE — PROGRESS NOTES
Problem: Falls - Risk of 
Goal: *Absence of Falls Document Didi Riggs Fall Risk and appropriate interventions in the flowsheet. Outcome: Progressing Towards Goal 
Fall Risk Interventions: 
Mobility Interventions: Assess mobility with egress test, Bed/chair exit alarm, Communicate number of staff needed for ambulation/transfer, OT consult for ADLs, Patient to call before getting OOB, PT Consult for mobility concerns, Utilize walker, cane, or other assistive device Medication Interventions: Bed/chair exit alarm, Evaluate medications/consider consulting pharmacy, Patient to call before getting OOB, Teach patient to arise slowly Elimination Interventions: Bed/chair exit alarm, Call light in reach, Patient to call for help with toileting needs, Toilet paper/wipes in reach, Toileting schedule/hourly rounds History of Falls Interventions: Bed/chair exit alarm, Door open when patient unattended, Evaluate medications/consider consulting pharmacy, Investigate reason for fall, Room close to nurse's station Problem: Pressure Injury - Risk of 
Goal: *Prevention of pressure injury Document Jr Scale and appropriate interventions in the flowsheet. Outcome: Progressing Towards Goal 
Pressure Injury Interventions: 
Sensory Interventions: Assess changes in LOC, Assess need for specialty bed, Check visual cues for pain, Discuss PT/OT consult with provider, Float heels, Keep linens dry and wrinkle-free, Maintain/enhance activity level, Minimize linen layers, Monitor skin under medical devices, Pad between skin to skin, Pressure redistribution bed/mattress (bed type), Turn and reposition approx. every two hours (pillows and wedges if needed) Moisture Interventions: Apply protective barrier, creams and emollients, Assess need for specialty bed, Check for incontinence Q2 hours and as needed, Limit adult briefs, Maintain skin hydration (lotion/cream), Minimize layers, Moisture barrier, Internal/External urinary devices Activity Interventions: Increase time out of bed, Pressure redistribution bed/mattress(bed type), PT/OT evaluation Mobility Interventions: Assess need for specialty bed, HOB 30 degrees or less, Pressure redistribution bed/mattress (bed type), PT/OT evaluation, Turn and reposition approx. every two hours(pillow and wedges) Nutrition Interventions: Discuss nutritional consult with provider, Document food/fluid/supplement intake Friction and Shear Interventions: Apply protective barrier, creams and emollients, HOB 30 degrees or less, Lift team/patient mobility team, Minimize layers, Transferring/repositioning devices

## 2018-10-10 NOTE — ROUTINE PROCESS
Received bedside report from [facility], patient was resting in bed, with visitor at bedside, watching television, [address],[place] [address] elevated, bed in lowest position and oriented to call button. Gave bedside report to Sofia Ortiz RN, using SBAR, MAR, and Kardex.

## 2018-10-10 NOTE — PROGRESS NOTES
Problem: Mobility Impaired (Adult and Pediatric) Goal: *Acute Goals and Plan of Care (Insert Text) Physical Therapy Goals Initiated 10/8/2018 and to be accomplished within 7 day(s) 1. Patient will move from supine to sit and sit to supine  and scoot up and down in bed with supervision/set-up. 2.  Patient will transfer from bed to chair and chair to bed with supervision/set-up using the least restrictive device. 3.  Patient will perform sit to stand with supervision/set-up. 4.  Patient will ambulate with supervision/set-up for >100 feet with the least restrictive device. 5.  Patient will ascend/descend 2 stairs with 1-2 handrail(s) with minimal assistance/contact guard assist.  
Outcome: Progressing Towards Goal 
physical Therapy TREATMENT Patient: Laney Avelar (82 y.o. female) Date: 10/10/2018 Diagnosis: Community acquired pneumonia SOB (shortness of breath) Acute bronchitis <principal problem not specified> Precautions: Fall, Aspiration Chart, physical therapy assessment, plan of care and goals were reviewed. OBJECTIVE/ASSESSMENT: 
Patient presented today semi-reclined in bed, drowsy but agreeable to PT treatment. She transferred to sitting EOB with SBA and additional time to complete, stood with RW and SBA. Patient ambulated 15ft to restroom using RW, then additional 75 ft using RW and SBA. Patient requesting trial without assistive device d/t good balance as she does not use walker at baseline. Patient ambulated additional 60 ft without assistive device with SBA for safety, no LOB noted. Patient did not require rest break throughout gait training. At conclusion of session, patient assisted back to bed per her request, left with call bell in reach, needs met, and nurse notified. Education: bed mobility, transfers, ambulation, assistive device management, safety awareness -- patient verbalized/demonstrated understanding Progression toward goals: [x]      Improving appropriately and progressing toward goals 
[]      Improving slowly and progressing toward goals 
[]      Not making progress toward goals and plan of care will be adjusted PLAN: 
Patient continues to benefit from skilled intervention to address the above impairments. Continue treatment per established plan of care. Discharge Recommendations:  Home Health Further Equipment Recommendations for Discharge:  N/A  
 
SUBJECTIVE:  
Patient stated I don't use anything at home, I want to try without this [walker].  OBJECTIVE DATA SUMMARY:  
Critical Behavior: 
Neurologic State: Alert Orientation Level: Oriented X4 Cognition: Follows commands Safety/Judgement: Fall prevention Functional Mobility Training: 
Bed Mobility: 
Supine to Sit: Stand-by assistance Sit to Supine: Supervision Scooting: Supervision Transfers: 
Sit to Stand: Stand-by assistance Stand to Sit: Stand-by assistance Balance: 
Sitting: Impaired Sitting - Static: Good (unsupported) Sitting - Dynamic: Fair (occasional) Standing: With support; Without support Standing - Static: Good Standing - Dynamic : Fair (Fair+) Ambulation/Gait Training: 
Distance (ft): 150 Feet (ft) Assistive Device: Walker, rolling; Other (comment) (90ft with RW, 60 ft without AD) Ambulation - Level of Assistance: Stand-by assistance Gait Abnormalities: Decreased step clearance Base of Support: Center of gravity altered Speed/Yakelin: Pace decreased (<100 feet/min) Step Length: Left shortened;Right shortened Interventions: Verbal cues; Safety awareness training Pain: 
Pre session: 0/10 Post session: 0/10 Activity Tolerance:  
Good Please refer to the flowsheet for vital signs taken during this treatment. After treatment:  
[] Patient left in no apparent distress sitting up in chair 
[x] Patient left in no apparent distress in bed 
[x] Call bell left within reach [x] Nursing notified Kindra Madden) [] Caregiver present [] Bed alarm activated Alvaro Sicard Time Calculation: 23 mins Mobility [id number] Current  CI= 1-19%   Goal  CI= 1-19%. The severity rating is based on the Level of Assistance required for Functional Mobility and ADLs.

## 2018-10-10 NOTE — PROGRESS NOTES
Problem: Self Care Deficits Care Plan (Adult) Goal: *Therapy Goal (Edit Goal, Insert Text) Occupational Therapy Goals Initiated 10/8/2018 within 7 day(s). 1.  Patient will perform lower body dressing with modified independence and no LOB while seated edge of bed/standing. 2.  Patient will perform upper body dressing with modified independence and no LOB while seated edge of bed. 3.  Patient will perform toilet transfers with modified independence and use of LRAD. 4.  Patient will perform all aspects of toileting with modified independence. 5.  Patient will participate in upper extremity therapeutic exercise/activities with modified independence for 8 minutes. 6.  Patient will perform simple grooming in stance with MI and Fair balance using LRAD for support as needed. Outcome: Progressing Towards Goal 
Occupational Therapy TREATMENT Patient: Salome Warner (53 y.o. female) Date: 10/10/2018 Diagnosis: Community acquired pneumonia SOB (shortness of breath) Acute bronchitis <principal problem not specified> Precautions: Fall, Aspiration Chart, occupational therapy assessment, plan of care, and goals were reviewed. ASSESSMENT: 
Pt is finishing taking her medication upon entry with nurse present. Pt requesting to brush teeth, stating she doesn't want to get OOB 2/2 feeling tired after breakfast 2/2 difficulty swallowing. Pt was able to complete ADL grooming and UB/LB dressing tasks seated EOB with set-up, following which maneuvered to [facility] w/SPT w/SBA for safety. Pt completed her toileting hygiene w/SBA in std after urinating, following which requested to return to sup 2/2 fatigue. Pt educated on PLB technique to prevent SOB, and on EC strategies to utilize at home during ADls and functional mobility, pt verbalized understanding. Progression toward goals: 
[x]          Improving appropriately and progressing toward goals 
[]          Improving slowly and progressing toward goals []          Not making progress toward goals and plan of care will be adjusted PLAN: 
Patient continues to benefit from skilled intervention to address the above impairments. Continue treatment per established plan of care. Discharge Recommendations:  Home Health with Supervision Further Equipment Recommendations for Discharge:  bedside commode and shower chair G-CODES:  
 
Self Care  Current  CJ= 20-39%  Goal  CI= 1-19%. The severity rating is based on the Level of Assistance required for Functional Mobility and ADLs. SUBJECTIVE:  
Patient stated The only problem I have is breathing.  OBJECTIVE DATA SUMMARY:  
Cognitive/Behavioral Status: 
Neurologic State: Alert Orientation Level: Oriented X4 Cognition: Follows commands Safety/Judgement: Fall prevention Functional Mobility and Transfers for ADLs: 
 Bed Mobility: 
  
Supine to Sit: Supervision Sit to Supine: Supervision Scooting: Supervision Transfers: 
Sit to Stand: Supervision Toilet Transfer : Stand-by assistance (to [facility]) Balance: 
Sitting: Impaired Sitting - Static: Good (unsupported) Sitting - Dynamic: Fair (occasional) ADL Intervention: 
Basic ADL Type of Bath: ONEOK Grooming Grooming Assistance: Supervision/set up (SEATED eob) Washing Face: Supervision/set-up Washing Hands: Supervision/set-up Brushing Teeth: Supervision/set-up Upper Body Dressing Assistance Hospital Gown: Supervision/ set-up Shirt simulation with hospital gown: Supervision/set-up Lower Body Dressing Assistance Socks: Supervision/set-up Leg Crossed Method Used: Yes Toileting Bladder Hygiene: Stand-by assistance Pain: 
Pt reports 0/10 pain or discomfort prior to treatment.   
Pt reports 0/10 pain or discomfort post treatment. Activity Tolerance:   
Fair Please refer to the flowsheet for vital signs taken during this treatment. After treatment:  
[]  Patient left in no apparent distress sitting up in chair [x]  Patient left in no apparent distress in bed 
[x]  Call bell left within reach [x]  Nursing notified 
[]  Caregiver present [x]  Bed alarm activated MISSAEL Davis Time Calculation: 38 mins

## 2018-10-11 LAB
ANION GAP SERPL CALC-SCNC: 10 MMOL/L (ref 3–18)
BACTERIA SPEC CULT: NORMAL
BACTERIA SPEC CULT: NORMAL
BASOPHILS # BLD: 0.1 K/UL (ref 0–0.1)
BASOPHILS NFR BLD: 1 % (ref 0–2)
BUN SERPL-MCNC: 14 MG/DL (ref 7–18)
BUN/CREAT SERPL: 12 (ref 12–20)
CALCIUM SERPL-MCNC: 9.1 MG/DL (ref 8.5–10.1)
CHLORIDE SERPL-SCNC: 104 MMOL/L (ref 100–108)
CO2 SERPL-SCNC: 24 MMOL/L (ref 21–32)
CREAT SERPL-MCNC: 1.17 MG/DL (ref 0.6–1.3)
DIFFERENTIAL METHOD BLD: ABNORMAL
EOSINOPHIL # BLD: 0.1 K/UL (ref 0–0.4)
EOSINOPHIL NFR BLD: 1 % (ref 0–5)
ERYTHROCYTE [DISTWIDTH] IN BLOOD BY AUTOMATED COUNT: 14 % (ref 11.6–14.5)
GLUCOSE BLD STRIP.AUTO-MCNC: 104 MG/DL (ref 70–110)
GLUCOSE BLD STRIP.AUTO-MCNC: 105 MG/DL (ref 70–110)
GLUCOSE BLD STRIP.AUTO-MCNC: 113 MG/DL (ref 70–110)
GLUCOSE BLD STRIP.AUTO-MCNC: 166 MG/DL (ref 70–110)
GLUCOSE SERPL-MCNC: 112 MG/DL (ref 74–99)
HCT VFR BLD AUTO: 41 % (ref 35–45)
HGB BLD-MCNC: 13.6 G/DL (ref 12–16)
LYMPHOCYTES # BLD: 1.7 K/UL (ref 0.9–3.6)
LYMPHOCYTES NFR BLD: 22 % (ref 21–52)
MCH RBC QN AUTO: 31.9 PG (ref 24–34)
MCHC RBC AUTO-ENTMCNC: 33.2 G/DL (ref 31–37)
MCV RBC AUTO: 96 FL (ref 74–97)
MONOCYTES # BLD: 0.8 K/UL (ref 0.05–1.2)
MONOCYTES NFR BLD: 11 % (ref 3–10)
NEUTS SEG # BLD: 4.7 K/UL (ref 1.8–8)
NEUTS SEG NFR BLD: 65 % (ref 40–73)
PLATELET # BLD AUTO: 205 K/UL (ref 135–420)
PMV BLD AUTO: 10.5 FL (ref 9.2–11.8)
POTASSIUM SERPL-SCNC: 4 MMOL/L (ref 3.5–5.5)
RBC # BLD AUTO: 4.27 M/UL (ref 4.2–5.3)
SERVICE CMNT-IMP: NORMAL
SERVICE CMNT-IMP: NORMAL
SODIUM SERPL-SCNC: 138 MMOL/L (ref 136–145)
WBC # BLD AUTO: 7.4 K/UL (ref 4.6–13.2)

## 2018-10-11 PROCEDURE — 80048 BASIC METABOLIC PNL TOTAL CA: CPT | Performed by: EMERGENCY MEDICINE

## 2018-10-11 PROCEDURE — 65660000000 HC RM CCU STEPDOWN

## 2018-10-11 PROCEDURE — 74011250636 HC RX REV CODE- 250/636: Performed by: HOSPITALIST

## 2018-10-11 PROCEDURE — 94640 AIRWAY INHALATION TREATMENT: CPT

## 2018-10-11 PROCEDURE — 82962 GLUCOSE BLOOD TEST: CPT

## 2018-10-11 PROCEDURE — 74011250636 HC RX REV CODE- 250/636: Performed by: INTERNAL MEDICINE

## 2018-10-11 PROCEDURE — 74011250637 HC RX REV CODE- 250/637: Performed by: INTERNAL MEDICINE

## 2018-10-11 PROCEDURE — 74011250637 HC RX REV CODE- 250/637: Performed by: HOSPITALIST

## 2018-10-11 PROCEDURE — 74011000250 HC RX REV CODE- 250: Performed by: EMERGENCY MEDICINE

## 2018-10-11 PROCEDURE — 36415 COLL VENOUS BLD VENIPUNCTURE: CPT | Performed by: EMERGENCY MEDICINE

## 2018-10-11 PROCEDURE — 74011250637 HC RX REV CODE- 250/637: Performed by: EMERGENCY MEDICINE

## 2018-10-11 PROCEDURE — 97530 THERAPEUTIC ACTIVITIES: CPT

## 2018-10-11 PROCEDURE — 85025 COMPLETE CBC W/AUTO DIFF WBC: CPT | Performed by: EMERGENCY MEDICINE

## 2018-10-11 PROCEDURE — 97116 GAIT TRAINING THERAPY: CPT

## 2018-10-11 PROCEDURE — 74011636637 HC RX REV CODE- 636/637: Performed by: INTERNAL MEDICINE

## 2018-10-11 RX ORDER — HEPARIN SODIUM 5000 [USP'U]/ML
5000 INJECTION, SOLUTION INTRAVENOUS; SUBCUTANEOUS EVERY 8 HOURS
Status: DISCONTINUED | OUTPATIENT
Start: 2018-10-11 | End: 2018-10-12 | Stop reason: HOSPADM

## 2018-10-11 RX ORDER — LEVOFLOXACIN 5 MG/ML
750 INJECTION, SOLUTION INTRAVENOUS
Status: DISCONTINUED | OUTPATIENT
Start: 2018-10-11 | End: 2018-10-12 | Stop reason: HOSPADM

## 2018-10-11 RX ORDER — GUAIFENESIN 100 MG/5ML
100 SOLUTION ORAL
Status: DISCONTINUED | OUTPATIENT
Start: 2018-10-11 | End: 2018-10-12 | Stop reason: HOSPADM

## 2018-10-11 RX ADMIN — ALLOPURINOL 150 MG: 100 TABLET ORAL at 09:20

## 2018-10-11 RX ADMIN — IPRATROPIUM BROMIDE AND ALBUTEROL SULFATE 3 ML: .5; 3 SOLUTION RESPIRATORY (INHALATION) at 08:36

## 2018-10-11 RX ADMIN — GUAIFENESIN 100 MG: 200 SOLUTION ORAL at 22:13

## 2018-10-11 RX ADMIN — LOVASTATIN 40 MG: 20 TABLET ORAL at 09:19

## 2018-10-11 RX ADMIN — LEVOTHYROXINE SODIUM 88 MCG: 88 TABLET ORAL at 09:19

## 2018-10-11 RX ADMIN — INSULIN LISPRO 2 UNITS: 100 INJECTION, SOLUTION INTRAVENOUS; SUBCUTANEOUS at 12:22

## 2018-10-11 RX ADMIN — LACTOBACILLUS TAB 2 TABLET: TAB at 17:01

## 2018-10-11 RX ADMIN — LEVOFLOXACIN 750 MG: 5 INJECTION, SOLUTION INTRAVENOUS at 16:49

## 2018-10-11 RX ADMIN — METOPROLOL TARTRATE 50 MG: 50 TABLET ORAL at 17:01

## 2018-10-11 RX ADMIN — METOPROLOL TARTRATE 50 MG: 50 TABLET ORAL at 09:20

## 2018-10-11 RX ADMIN — HEPARIN SODIUM 5000 UNITS: 5000 INJECTION, SOLUTION INTRAVENOUS; SUBCUTANEOUS at 16:49

## 2018-10-11 RX ADMIN — CYANOCOBALAMIN TAB 1000 MCG 1000 MCG: 1000 TAB at 09:19

## 2018-10-11 RX ADMIN — DOCUSATE SODIUM 100 MG: 100 CAPSULE, LIQUID FILLED ORAL at 17:01

## 2018-10-11 RX ADMIN — AMLODIPINE BESYLATE 2.5 MG: 2.5 TABLET ORAL at 09:20

## 2018-10-11 RX ADMIN — VITAMIN D, TAB 1000IU (100/BT) 5000 UNITS: 25 TAB at 09:20

## 2018-10-11 RX ADMIN — GUAIFENESIN 100 MG: 200 SOLUTION ORAL at 16:59

## 2018-10-11 RX ADMIN — IPRATROPIUM BROMIDE AND ALBUTEROL SULFATE 3 ML: .5; 3 SOLUTION RESPIRATORY (INHALATION) at 01:13

## 2018-10-11 RX ADMIN — LACTOBACILLUS TAB 2 TABLET: TAB at 09:21

## 2018-10-11 RX ADMIN — DOCUSATE SODIUM 100 MG: 100 CAPSULE, LIQUID FILLED ORAL at 09:19

## 2018-10-11 RX ADMIN — ONDANSETRON HYDROCHLORIDE 4 MG: 2 INJECTION, SOLUTION INTRAMUSCULAR; INTRAVENOUS at 21:22

## 2018-10-11 RX ADMIN — IPRATROPIUM BROMIDE AND ALBUTEROL SULFATE 3 ML: .5; 3 SOLUTION RESPIRATORY (INHALATION) at 21:29

## 2018-10-11 NOTE — PROGRESS NOTES
[facility] Hospitalist Group Progress Note Patient: Marco Bowels Age: 80 y.o. : 1926 MR#: [id number] SSN: [id number] Date/Time: 10/11/2018 Subjective:  
 
Patient lying in bed, NAD, Awake, follows commands. Swallowing slightly better. Still c/o cough and sputum. Assessment/Plan: 1- PNA 
2- ? Acute bronchitis 3- HTN 4- DM2 
5- ZACKARY 6- Hypothyroidism 7- Dysphagia- diet as per speech PLAN Will change Abx to Levaquin from ceftriaxone Add robitussin. Cont chlorseptic throat spray ST following, diet as per speech Aspiration precautions GI following, no EGD for now Monitor renal function SSI Full code Cont Bowel regimen Cont PT, OT 
D/w patient and son Vlad Villagran [phone] in detail. Case discussed with:  [x]Patient  [x]Family  []Nursing  []Case Management DVT Prophylaxis:  []Lovenox  [x]Hep SQ  []SCDs  []Coumadin   []On Heparin gtt Objective:  
VS:  
Visit Vitals  /60 (BP 1 Location: Left arm, BP Patient Position: Sitting)  Pulse 60  Temp 97.6 °F (36.4 °C)  Resp 18  Ht 5' 3\" (1.6 m)  Wt 62.4 kg (137 lb 9.1 oz)  SpO2 96%  Breastfeeding No  
 BMI 24.37 kg/m2 Tmax/24hrs: Temp (24hrs), Av.7 °F (36.5 °C), Min:97.5 °F (36.4 °C), Max:97.9 °F (36.6 °C) Input/Output:  
No intake or output data in the 24 hours ending 10/11/18 1620 General:  Awake, alert Cardiovascular:  S1S2+, RRR Pulmonary:  CTA b/l GI:  Soft, BS+, NT, ND Extremities:  No edema Labs:   
Recent Results (from the past 24 hour(s)) GLUCOSE, POC Collection Time: 10/10/18  9:10 PM  
Result Value Ref Range Glucose (POC) 107 70 - 110 mg/dL CBC WITH AUTOMATED DIFF Collection Time: 10/11/18  2:32 AM  
Result Value Ref Range WBC 7.4 4.6 - 13.2 K/uL  
 RBC 4.27 4.20 - 5.30 M/uL  
 HGB 13.6 12.0 - 16.0 g/dL HCT 41.0 35.0 - 45.0 % MCV 96.0 74.0 - 97.0 FL  
 MCH 31.9 24.0 - 34.0 PG  
 MCHC 33.2 31.0 - 37.0 g/dL RDW 14.0 11.6 - 14.5 % PLATELET 463 434 - 451 K/uL MPV 10.5 9.2 - 11.8 FL  
 NEUTROPHILS 65 40 - 73 % LYMPHOCYTES 22 21 - 52 % MONOCYTES 11 (H) 3 - 10 % EOSINOPHILS 1 0 - 5 % BASOPHILS 1 0 - 2 %  
 ABS. NEUTROPHILS 4.7 1.8 - 8.0 K/UL  
 ABS. LYMPHOCYTES 1.7 0.9 - 3.6 K/UL  
 ABS. MONOCYTES 0.8 0.05 - 1.2 K/UL  
 ABS. EOSINOPHILS 0.1 0.0 - 0.4 K/UL  
 ABS. BASOPHILS 0.1 0.0 - 0.1 K/UL  
 DF AUTOMATED METABOLIC PANEL, BASIC Collection Time: 10/11/18  2:32 AM  
Result Value Ref Range Sodium 138 136 - 145 mmol/L Potassium 4.0 3.5 - 5.5 mmol/L Chloride 104 100 - 108 mmol/L  
 CO2 24 21 - 32 mmol/L Anion gap 10 3.0 - 18 mmol/L Glucose 112 (H) 74 - 99 mg/dL BUN 14 7.0 - 18 MG/DL Creatinine 1.17 0.6 - 1.3 MG/DL  
 BUN/Creatinine ratio 12 12 - 20 GFR est AA 52 (L) >60 ml/min/1.73m2 GFR est non-AA 43 (L) >60 ml/min/1.73m2 Calcium 9.1 8.5 - 10.1 MG/DL  
GLUCOSE, POC Collection Time: 10/11/18  8:20 AM  
Result Value Ref Range Glucose (POC) 104 70 - 110 mg/dL GLUCOSE, POC Collection Time: 10/11/18 11:55 AM  
Result Value Ref Range Glucose (POC) 166 (H) 70 - 110 mg/dL Additional Data Reviewed:   
 
Signed By: Kevin Garner MD   
 October 11, 2018

## 2018-10-11 NOTE — PROGRESS NOTES
Bedside and Verbal shift change report given to Johanne Salvador RN (oncoming nurse) by Ludwin Ball RN. Report included the following information SBAR, Kardex, ED Summary, OR Summary and Recent Results.

## 2018-10-11 NOTE — PROGRESS NOTES
Problem: Mobility Impaired (Adult and Pediatric) Goal: *Acute Goals and Plan of Care (Insert Text) Physical Therapy Goals Initiated 10/8/2018 and to be accomplished within 7 day(s) 1. Patient will move from supine to sit and sit to supine  and scoot up and down in bed with supervision/set-up. 2.  Patient will transfer from bed to chair and chair to bed with supervision/set-up using the least restrictive device. 3.  Patient will perform sit to stand with supervision/set-up. 4.  Patient will ambulate with supervision/set-up for >100 feet with the least restrictive device. 5.  Patient will ascend/descend 2 stairs with 1-2 handrail(s) with minimal assistance/contact guard assist.  
Outcome: Progressing Towards Goal 
physical Therapy TREATMENT Patient: Arsh Ambrocio (83 y.o. female) Date: 10/11/2018 Diagnosis: Community acquired pneumonia SOB (shortness of breath) Acute bronchitis <principal problem not specified> Precautions: Fall, Aspiration Chart, physical therapy assessment, plan of care and goals were reviewed. OBJECTIVE/ ASSESSMENT: 
Patient found supine in bed willing to work with PT. Pt ambulated without AD and fair balance. Pt furniture surfs at times and grasps this LPTAs arm as well for support. Pt reports she has a cane at home she previously used while having hip bursitis. Pt would benefit from trial of SPC next tx, though pt states she feels a cane tripped her up in the past. Pt is unsure whether her cane at home has one point or four. Pt greatly increased distance and present with increased ag compared to previous tx sessions. Pt requires cues for safety especially in turns. Pt returned to room and sat EOB. Recliner chair was supplied to pt and pt left sitting with needs in reach. Education: therex, gait. Progression toward goals: 
[]      Improving appropriately and progressing toward goals [x]      Improving slowly and progressing toward goals []      Not making progress toward goals and plan of care will be adjusted PLAN: 
Patient continues to benefit from skilled intervention to address the above impairments. Continue treatment per established plan of care. Discharge Recommendations:  Home Health with supervision Further Equipment Recommendations for Discharge:  straight cane? SUBJECTIVE:  
Patient stated I was only shown once.  Pt reports she has not been using incentive spirometer and was educate don how to use / to use hourly. OBJECTIVE DATA SUMMARY:  
Critical Behavior: 
Neurologic State: Alert Orientation Level: Oriented X4 Cognition: Follows commands Safety/Judgement: Fall prevention Functional Mobility Training: 
Bed Mobility: 
Sit to Supine: Supervision Transfers: 
Sit to Stand: Stand-by assistance;Contact guard assistance Stand to Sit: Stand-by assistance Balance: 
Sitting: Impaired Sitting - Static: Good (unsupported) Sitting - Dynamic: Fair (occasional) (+) Standing: Impaired; With support Standing - Static: Fair Standing - Dynamic : Fair Ambulation/Gait Training: 
Distance (ft): 270 Feet (ft) Assistive Device:  (Non3) Ambulation - Level of Assistance: Stand-by assistance;Contact guard assistance Gait Abnormalities: Decreased step clearance Base of Support: Center of gravity altered Speed/Yakelin: Slow Step Length: Left shortened;Right shortened Interventions: Verbal cues Pain: 
Pre tx pain: 0 Post tx pain: 0 Pain Scale 1: Numeric (0 - 10) Pain Intensity 1: 0 Activity Tolerance:  
Fair Please refer to the flowsheet for vital signs taken during this treatment. After treatment:  
[x] Patient left in no apparent distress sitting up in chair 
[] Patient left in no apparent distress in bed 
[x] Call bell left within reach 
[] Nursing notified 
[] Caregiver present 
[] Bed alarm activated 
[] SCDs applied 
[] Ice applied Rae De Leon PTA Time Calculation: 38 mins Mobility [id number] Current  CI= 1-19%. The severity rating is based on the Level of Assistance required for Functional Mobility and ADLs. Mobility   Goal  CI= 1-19%. The severity rating is based on the Level of Assistance required for Functional Mobility and ADLs.

## 2018-10-11 NOTE — PROGRESS NOTES
NUTRITION Nutrition Screen RECOMMENDATIONS / PLAN:  
 
-  Add nutritional supplement: Magic Cup VIJAYA BID.  
- Continue RD inpatient monitoring and evaluation. NUTRITION INTERVENTIONS & DIAGNOSIS:  
 
[x] Meals/snacks: modified composition 
[x] Medical food supplement therapy: initiate Nutrition Diagnosis: Inadequate oral intake related to dislike of provided meals and decreased appetite as evidenced by pt consuming 50% or less of recent meals. ASSESSMENT:  
 
Pt with poor intake and appetite; having swallowing difficulty, SLP following. Pt reports that she dislikes the taste of the food, plan to add nutritional supplement Magic Cup VIJAYA BID. Average po intake adequate to meet patients estimated nutritional needs:   [] Yes     [x] No   [] Unable to determine at this time Diet: DIET DIABETIC CONSISTENT CARB Mechanical Soft; 1 NECTAR Food Allergies: NKFA Current Appetite:   [] Good     [x] Fair : pt dislikes some of meals provided   [] Poor     [] Other: 
Appetite/meal intake prior to admission:   [] Good     [] Fair     [] Poor     [x] Other: unknown Feeding Limitations:  [x] Swallowing difficulty    [] Chewing difficulty    [] Other: 
Current Meal Intake: Patient Vitals for the past 100 hrs: 
 % Diet Eaten 10/10/18 1200 25 % 10/10/18 0937 50 % 10/08/18 1320 50 % 10/08/18 0920 50 % BM: 10/10 Skin Integrity: WDL Edema:   [x] No     [] Yes Pertinent Medications: Reviewed: dulcolax, cholecalciferol, cyanocobalamin, colace, SSI, floranex, zofran, NS at 50 mL/hr Recent Labs 10/11/18 
 0232  10/09/18 
 2643 NA  138  139  
K  4.0  3.9 CL  104  106 CO2  24  25 GLU  112*  113* BUN  14  13 CREA  1.17  1.03  
CA  9.1  9.0 No intake or output data in the 24 hours ending 10/11/18 1558 Anthropometrics: 
Ht Readings from Last 1 Encounters:  
10/05/18 5' 3\" (1.6 m) Last 3 Recorded Weights in this Encounter 10/09/18 0321 10/10/18 0400 10/11/18 0451 Weight: 63.1 kg (139 lb 3.2 oz) 62.9 kg (138 lb 11.2 oz) 62.4 kg (137 lb 9.1 oz) Body mass index is 24.37 kg/(m^2). Weight History: Per chart hx 8 lb wt loss (5.5%) x 5 months PTA Weight Metrics 10/11/2018 5/7/2018 8/29/2016 8/12/2016 Weight 137 lb 9.1 oz 145 lb 136 lb 136 lb BMI 24.37 kg/m2 25.69 kg/m2 24.09 kg/m2 24.09 kg/m2 Admitting Diagnosis: Community acquired pneumonia SOB (shortness of breath) Acute bronchitis Pertinent PMHx: diabetes, gout, HTN, hypercholesterolemia, thyroid disease Education Needs:        [x] None identified  [] Identified - Not appropriate at this time  []  Identified and addressed - refer to education log Learning Limitations:   [] None identified  [x] Identified: hard of hearing Cultural, Alevism & ethnic food preferences:  [x] None identified    [] Identified and addressed ESTIMATED NUTRITION NEEDS:  
 
Calories: 4699-9373 kcal (MSJx1.2-1.3) based on  [x] Actual BW 62 kg     [] IBW Protein:  50-62 gm (0.8-1 gm/kg) based on  [x] Actual BW      [] IBW Fluid: 1 mL/kcal 
  
MONITORING & EVALUATION:  
 
Nutrition Goal(s): 1. Po intake of meals will meet >75% of patient estimated nutritional needs within the next 7 days. Outcome:  [] Met/Ongoing    []  Not Met    [x] New/Initial Goal  
 
Monitoring:   [x] Food and beverage intake   [x] Diet order   [x] Nutrition-focused physical findings   [x] Treatment/therapy   [] Weight   [] Enteral nutrition intake Previous Recommendations (for follow-up assessments only):     []   Implemented       []   Not Implemented (RD to address)      [] No Longer Appropriate     [] No Recommendation Made Discharge Planning: Diabetic diet, consistency per SLP recommendation  
[x] Participated in care planning, discharge planning, & interdisciplinary rounds as appropriate Brooke Funes Dietetic Intern Pager: [phone] Corey Garcia

## 2018-10-11 NOTE — PROGRESS NOTES
[email or web address] 
[phone] Gastroenterology follow up-Progress note Impression: 1. Dysphagia - likely motility disorder, denies ondynophagia, tolerating soft diet well, denies pill dysphagia 2. PNA, ? Bronchitis - ?phlegm contributing to swallowing issues 3. HTN 4. DM 
5. ZACKARY 6. Hypothyroidism Plan: 
1. 1. Conservative management, slow improvement with speech therapy, will continue to monitor if no improvement with SLP will pursue EGD/dilate 2. Soft diet per speech 3. Continue current management Chief Complaint: Dysphagia Subjective:  Continues to complain of coughing up phlegm which she feels is causing her swallowing issues, tolerating soft diet, denies liquid or pill dysphagia Eyes: conjunctiva normal, EOM normal  
Neck: ROM normal, supple and trachea normal  
Cardiovascular: heart normal, intact distal pulses, normal rate and regular rhythm Pulmonary/Chest Wall: breath sounds normal and effort normal  
Abdominal: appearance normal, bowel sounds normal and soft, non-acute, non-tender Patient Active Problem List  
Diagnosis Code  Bloody discharge from right nipple N64.52  
 Acute bronchitis J20.9  SOB (shortness of breath) R06.02  
 Community acquired pneumonia J18.9 Visit Vitals  /70 (BP 1 Location: Left arm, BP Patient Position: At rest)  Pulse 69  Temp 97.6 °F (36.4 °C)  Resp 18  Ht 5' 3\" (1.6 m)  Wt 62.4 kg (137 lb 9.1 oz)  SpO2 97%  Breastfeeding No  
 BMI 24.37 kg/m2 Intake/Output Summary (Last 24 hours) at 10/11/18 1007 Last data filed at 10/10/18 1200 Gross per 24 hour Intake              120 ml Output              400 ml Net             -280 ml CBC w/Diff Lab Results Component Value Date/Time  WBC 7.4 10/11/2018 02:32 AM  
 RBC 4.27 10/11/2018 02:32 AM  
 HGB 13.6 10/11/2018 02:32 AM  
 HCT 41.0 10/11/2018 02:32 AM  
 MCV 96.0 10/11/2018 02:32 AM  
 MCH 31.9 10/11/2018 02:32 AM  
 MCHC 33.2 10/11/2018 02:32 AM  
 RDW 14.0 10/11/2018 02:32 AM  
  10/11/2018 02:32 AM  
 Lab Results Component Value Date/Time GRANS 65 10/11/2018 02:32 AM  
 LYMPH 22 10/11/2018 02:32 AM  
 EOS 1 10/11/2018 02:32 AM  
 BASOS 1 10/11/2018 02:32 AM  
  
Basic Metabolic Profile Recent Labs 10/11/18 
 0232 NA  138  
K  4.0  
CL  104 CO2  24 BUN  14  
CA  9.1 Hepatic Function Lab Results Component Value Date/Time ALB 3.8 10/05/2018 07:10 AM  
 TP 7.5 10/05/2018 07:10 AM  
 AP 87 10/05/2018 07:10 AM  
 Lab Results Component Value Date/Time SGOT 33 10/05/2018 07:10 AM  
  
 
 
[facility] Recent Labs 10/09/18 
 0209 PTP  13.2 INR  1.0 [facility] Phone: [phone] Pager: [phone]

## 2018-10-12 ENCOUNTER — HOME HEALTH ADMISSION (OUTPATIENT)
Dept: HOME HEALTH SERVICES | Facility: HOME HEALTH | Age: 83
End: 2018-10-12
Payer: MEDICARE

## 2018-10-12 ENCOUNTER — APPOINTMENT (OUTPATIENT)
Dept: GENERAL RADIOLOGY | Age: 83
DRG: 202 | End: 2018-10-12
Attending: HOSPITALIST
Payer: MEDICARE

## 2018-10-12 VITALS
RESPIRATION RATE: 18 BRPM | TEMPERATURE: 97.6 F | BODY MASS INDEX: 23.78 KG/M2 | DIASTOLIC BLOOD PRESSURE: 72 MMHG | HEART RATE: 70 BPM | HEIGHT: 63 IN | WEIGHT: 134.2 LBS | OXYGEN SATURATION: 96 % | SYSTOLIC BLOOD PRESSURE: 161 MMHG

## 2018-10-12 LAB
GLUCOSE BLD STRIP.AUTO-MCNC: 103 MG/DL (ref 70–110)
GLUCOSE BLD STRIP.AUTO-MCNC: 104 MG/DL (ref 70–110)
GLUCOSE BLD STRIP.AUTO-MCNC: 106 MG/DL (ref 70–110)

## 2018-10-12 PROCEDURE — 92526 ORAL FUNCTION THERAPY: CPT

## 2018-10-12 PROCEDURE — 90686 IIV4 VACC NO PRSV 0.5 ML IM: CPT | Performed by: HOSPITALIST

## 2018-10-12 PROCEDURE — 74011250637 HC RX REV CODE- 250/637: Performed by: HOSPITALIST

## 2018-10-12 PROCEDURE — 97535 SELF CARE MNGMENT TRAINING: CPT

## 2018-10-12 PROCEDURE — 82962 GLUCOSE BLOOD TEST: CPT

## 2018-10-12 PROCEDURE — 97530 THERAPEUTIC ACTIVITIES: CPT

## 2018-10-12 PROCEDURE — 74011250636 HC RX REV CODE- 250/636: Performed by: HOSPITALIST

## 2018-10-12 PROCEDURE — 74011250636 HC RX REV CODE- 250/636: Performed by: INTERNAL MEDICINE

## 2018-10-12 PROCEDURE — 94640 AIRWAY INHALATION TREATMENT: CPT

## 2018-10-12 PROCEDURE — 74011250637 HC RX REV CODE- 250/637: Performed by: INTERNAL MEDICINE

## 2018-10-12 PROCEDURE — 71045 X-RAY EXAM CHEST 1 VIEW: CPT

## 2018-10-12 PROCEDURE — 90471 IMMUNIZATION ADMIN: CPT

## 2018-10-12 PROCEDURE — 77030038269 HC DRN EXT URIN PURWCK BARD -A

## 2018-10-12 PROCEDURE — 74011000250 HC RX REV CODE- 250: Performed by: EMERGENCY MEDICINE

## 2018-10-12 PROCEDURE — 74011250637 HC RX REV CODE- 250/637: Performed by: EMERGENCY MEDICINE

## 2018-10-12 RX ORDER — BENZONATATE 100 MG/1
100 CAPSULE ORAL
Qty: 30 CAP | Refills: 0 | Status: SHIPPED | OUTPATIENT
Start: 2018-10-12 | End: 2018-10-19

## 2018-10-12 RX ORDER — FAMOTIDINE 20 MG/1
20 TABLET, FILM COATED ORAL DAILY
Status: DISCONTINUED | OUTPATIENT
Start: 2018-10-12 | End: 2018-10-12 | Stop reason: HOSPADM

## 2018-10-12 RX ORDER — GUAIFENESIN 100 MG/5ML
100 SOLUTION ORAL
Qty: 236 ML | Refills: 0 | Status: SHIPPED | OUTPATIENT
Start: 2018-10-12

## 2018-10-12 RX ORDER — UREA 10 %
2 LOTION (ML) TOPICAL 2 TIMES DAILY
Qty: 10 TAB | Refills: 0 | Status: SHIPPED | OUTPATIENT
Start: 2018-10-12

## 2018-10-12 RX ORDER — DOCUSATE SODIUM 100 MG/1
100 CAPSULE, LIQUID FILLED ORAL 2 TIMES DAILY
Qty: 60 CAP | Refills: 2 | Status: SHIPPED | OUTPATIENT
Start: 2018-10-12 | End: 2018-10-12

## 2018-10-12 RX ORDER — LEVOFLOXACIN 750 MG/1
750 TABLET ORAL
Qty: 2 TAB | Refills: 0 | Status: SHIPPED | OUTPATIENT
Start: 2018-10-13 | End: 2018-10-17

## 2018-10-12 RX ORDER — FAMOTIDINE 20 MG/1
20 TABLET, FILM COATED ORAL 2 TIMES DAILY
Qty: 6 TAB | Refills: 0 | Status: SHIPPED | OUTPATIENT
Start: 2018-10-12

## 2018-10-12 RX ORDER — DOCUSATE SODIUM 100 MG/1
100 CAPSULE, LIQUID FILLED ORAL
Qty: 30 CAP | Refills: 0 | Status: SHIPPED | OUTPATIENT
Start: 2018-10-12 | End: 2019-01-10

## 2018-10-12 RX ADMIN — LOVASTATIN 40 MG: 20 TABLET ORAL at 08:44

## 2018-10-12 RX ADMIN — METOPROLOL TARTRATE 50 MG: 50 TABLET ORAL at 08:44

## 2018-10-12 RX ADMIN — AMLODIPINE BESYLATE 2.5 MG: 2.5 TABLET ORAL at 08:44

## 2018-10-12 RX ADMIN — ALLOPURINOL 150 MG: 100 TABLET ORAL at 08:43

## 2018-10-12 RX ADMIN — LACTOBACILLUS TAB 2 TABLET: TAB at 08:43

## 2018-10-12 RX ADMIN — HEPARIN SODIUM 5000 UNITS: 5000 INJECTION, SOLUTION INTRAVENOUS; SUBCUTANEOUS at 03:35

## 2018-10-12 RX ADMIN — INFLUENZA VIRUS VACCINE 0.5 ML: 15; 15; 15; 15 SUSPENSION INTRAMUSCULAR at 13:39

## 2018-10-12 RX ADMIN — ONDANSETRON HYDROCHLORIDE 4 MG: 2 INJECTION, SOLUTION INTRAMUSCULAR; INTRAVENOUS at 08:45

## 2018-10-12 RX ADMIN — CYANOCOBALAMIN TAB 1000 MCG 1000 MCG: 1000 TAB at 08:44

## 2018-10-12 RX ADMIN — VITAMIN D, TAB 1000IU (100/BT) 5000 UNITS: 25 TAB at 08:43

## 2018-10-12 RX ADMIN — DOCUSATE SODIUM 100 MG: 100 CAPSULE, LIQUID FILLED ORAL at 08:43

## 2018-10-12 RX ADMIN — GUAIFENESIN 100 MG: 200 SOLUTION ORAL at 08:44

## 2018-10-12 RX ADMIN — FAMOTIDINE 20 MG: 20 TABLET ORAL at 13:33

## 2018-10-12 RX ADMIN — HEPARIN SODIUM 5000 UNITS: 5000 INJECTION, SOLUTION INTRAVENOUS; SUBCUTANEOUS at 08:44

## 2018-10-12 RX ADMIN — IPRATROPIUM BROMIDE AND ALBUTEROL SULFATE 3 ML: .5; 3 SOLUTION RESPIRATORY (INHALATION) at 04:19

## 2018-10-12 RX ADMIN — LEVOTHYROXINE SODIUM 88 MCG: 88 TABLET ORAL at 08:45

## 2018-10-12 NOTE — PROGRESS NOTES
Tested patient without oxygen. At rest at room air was 96%. With ambulation on exertion was 92% on room air

## 2018-10-12 NOTE — HOME CARE
Rec HC order D/C noted for today [facility] will follow for SN/PT/OT/ST - BECKY Armijo RN

## 2018-10-12 NOTE — PROGRESS NOTES
Laryngeal Strengthening Exercises 1. Effortful Swallow: Collect all the saliva in your mouth onto the center of your tongue. Keep your lips closed and tight together. Pretend you are swallowing a grape whole in one big, hard swallow. Repeat x 10.  
 
2. Isokinetic (dynamic) Shaker:  Lift your chin to your chest x 10. Rest for 30 seconds and repeat x 5.  
 
3. Isometric (static) Shaker:  Lift your chin to your chest and hold for 10 seconds. Relax 10 seconds and repeat x 5. 
 
4. Jaw Thrust: Move your lower jaw as far forward as you can. Your lower teeth should be in front of your upper teeth. Lift and lower your head x 20 reps, then hold up, facing the ceiling for 5 seconds. Repeat x 5.  
 
5. Lexus Maneuver: Stick your tongue out of your mouth between your front teeth and gently bite down to hold it in place. Swallow while keeping your tongue gently between your teeth. You can let go of your tongue between swallows and repeat x 10.  
 
6. Mendelsohn Maneuver: Place your middle three fingers (index, middle, ring) on your Robledo Apple (the skin in front of your neck beneath your chin). Swallow once to practice. Feel your Robledo Apple slide upward as you swallow. Now, swallow again and when your Charmayne Chin gets to its highest position in the throat, squeeze your throat muscles and hold it as high as you can for as long as you can if you cant hold it for this length of time). 7. Supraglottic Maneuver: Collect a small bit of saliva in mouth. Take a deep breath and hold your breath. Keep holding your breath while you swallow. Immediately after you swallow, cough. Practice with saliva prior to food or liquid. Repeat x 10.  
 
8. Tongue Strength Exercise: Using a tongue depressor, press the tip of your tongue out against the tongue depressor. Put the tongue depressor on the tip of your tongue and push up.  To exercise the middle part of your tongue, put the tongue depressor towards the middle of your tongue and push up against the roof of your mouth. To exercise the back of the tongue, say the \"k\" sound, then put the tongue depressor on the spot of the tongue that made contact with the roof of your mouth and push up. Next, sweep the tip of your tongue from the very front of your mouth to the back along the roof of your mouth. Lastly, lateralize your tongue from one corner of your mouth to the other. Repeat x 20.  
 
9. Tongue Range of Motion: First, stick your tongue out as far as possible and hold as instructed. Then pull the tongue back into the mouth as far as you can. Then, lateralize the tongue tip to one corner of your mouth and hold. Then switch to the opposite side and hold. Lastly, open your mouth put your tongue tip behind your top teeth and hold the stretch. Complete each x 20 reps. 10. Effortful Pitch Glide:Say \"ee\" in as low a pitch as possible and then gradually raise the pitch of your voice until the highest tone possible. Hold this tone for as long as you can, relax and repeat x 20.

## 2018-10-12 NOTE — DISCHARGE INSTRUCTIONS
Discharge Instructions    Patient: Valerie Wadsworth MRN: [id number]  CSN: [id number]    YOB: 1926  Age: 80 y.o. Sex: female    DOA: 10/5/2018 LOS:  LOS: 7 days   Discharge Date:      DIET:  Cardiac and Diabetic mechanical soft Diet with necator thick liquids     ACTIVITY: Activity as tolerated  Home health care for Skilled care for DM, Hypertension and medication management    ·    PT/OT consult  ·             Speech consult      ADDITIONAL INFORMATION: If you experience any of the following symptoms but not limited to Fever, chills, nausea, vomiting, diarrhea, change in mentation, falling, bleeding, shortness of breath, chest pain, please call your primary care physician or return to the emergency room if you cannot get hold of your doctor:     FOLLOW UP CARE:  Dr. Himanshu Montana MD in 7-10 days. Please call and set up an appointment.   Dr. Isi Peoples in 4 week      David Galo MD  10/12/2018 12:31 PM

## 2018-10-12 NOTE — DISCHARGE SUMMARY
Discharge Summary    Patient: Magda Richards MRN: [id number]  CSN: [id number]    YOB: 1926  Age: 80 y.o. Sex: female    DOA: 10/5/2018 LOS:  LOS: 7 days   Discharge Date:      Admission Diagnoses: Community acquired pneumonia  SOB (shortness of breath)  Acute bronchitis    Discharge Diagnoses:  PLEASE SEE DICTATION. Discharge Condition: Stable    PHYSICAL EXAM  Visit Vitals    /73 (BP 1 Location: Left arm, BP Patient Position: At rest)    Pulse 73    Temp 97.8 °F (36.6 °C)    Resp 18    Ht 5' 3\" (1.6 m)    Wt 60.9 kg (134 lb 3.2 oz)    SpO2 97%    Breastfeeding No    BMI 23.77 kg/m2       General: Alert, cooperative, no acute distress    Lungs:  CTA Bilaterally. No Wheezing/Rhonchi/Rales. Heart:  Regular rate and Rhythm. Abdomen: Soft, Non distended, Non tender. + Bowel sounds. Extremities: No edema/ cyanosis/ clubbing  Psych:   Good insight. Not anxious or agitated. Neurologic:  AA oriented X 3. Moves all extremities. Hospital Course: Please see dictation. code # [id number]. Discharge Medications:     Current Discharge Medication List      START taking these medications    Details   benzonatate (TESSALON) 100 mg capsule Take 1 Cap by mouth three (3) times daily as needed for Cough for up to 7 days. Qty: 30 Cap, Refills: 0      guaiFENesin (ROBITUSSIN) 100 mg/5 mL liquid Take 5 mL by mouth every four (4) hours as needed for Cough. Qty: 236 mL, Refills: 0      phenol throat spray (CHLORASEPTIC) 1.4 % spray Take 1 Spray by mouth as needed for Sore throat. Qty: 1 Bottle, Refills: 0      levoFLOXacin (LEVAQUIN) 750 mg tablet Take 1 Tab by mouth every fourty-eight (48) hours for 4 days. Qty: 2 Tab, Refills: 0      famotidine (PEPCID) 20 mg tablet Take 1 Tab by mouth two (2) times a day. Qty: 6 Tab, Refills: 0      Lactobacillus Acidoph & Bulgar (FLORANEX) 1 million cell tab tablet Take 2 Tabs by mouth two (2) times a day.   Qty: 10 Tab, Refills: 0      docusate sodium (COLACE) 100 mg capsule Take 1 Cap by mouth two (2) times daily as needed for Constipation for up to 90 days. Qty: 30 Cap, Refills: 0         CONTINUE these medications which have NOT CHANGED    Details   nystatin (MYCOSTATIN) 100,000 unit/mL suspension Take 10 mL by mouth two (2) times a day. glimepiride (AMARYL) 1 mg tablet       amLODIPine (NORVASC) 2.5 mg tablet TAKE 1 TABLET BY MOUTH EVERY MORNING  Refills: 2      levothyroxine (SYNTHROID) 88 mcg tablet TAKE 1 TABLET BY MOUTH DAILY  Refills: 1      allopurinol (ZYLOPRIM) 300 mg tablet Take 150 mg by mouth daily. cyanocobalamin (VITAMIN B-12) 1,000 mcg tablet Take 1,000 mcg by mouth daily. metoprolol tartrate (LOPRESSOR) 50 mg tablet Take  by mouth two (2) times a day. vitamin E (AQUA GEMS) 400 unit capsule Take  by mouth daily. lovastatin (MEVACOR) 40 mg tablet TAKE 1 TABLET BY MOUTH EVERY DAY  Refills: 1      metFORMIN ER (GLUCOPHAGE XR) 750 mg tablet Take 750 mg by mouth daily. cholecalciferol (VITAMIN D3) 1,000 unit tablet Take 5,000 Units by mouth daily. STOP taking these medications       indomethacin (INDOCIN) 50 mg capsule Comments:   Reason for Stopping:             · It is important that you take the medication exactly as they are prescribed. · Keep your medication in the bottles provided by the pharmacist and keep a list of the medication names, dosages, and times to be taken in your wallet. · Do not take other medications without consulting your doctor.      DIET: Cardiac and Diabetic mechanical soft Diet with necator thick liquids     ACTIVITY: Activity as tolerated  Home health care for Skilled care for DM, Hypertension and medication management    ·    PT/OT consult  ·             Speech consult      ADDITIONAL INFORMATION: If you experience any of the following symptoms but not limited to Fever, chills, nausea, vomiting, diarrhea, change in mentation, falling, bleeding, shortness of breath, chest pain, please call your primary care physician or return to the emergency room if you cannot get hold of your doctor:     FOLLOW UP CARE:  Dr. Chun New MD in 7-10 days. Please call and set up an appointment. LEXI Marc in 4 weeks.      Minutes spent on discharge: 40 minutes spent coordinating this discharge (review instructions/follow-up, prescriptions, preparing report for sign off)    Dae Solo MD  10/12/2018 12:32 PM

## 2018-10-12 NOTE — PROGRESS NOTES
Problem: Self Care Deficits Care Plan (Adult) Goal: *Therapy Goal (Edit Goal, Insert Text) Occupational Therapy Goals Initiated 10/8/2018 within 7 day(s). 1.  Patient will perform lower body dressing with modified independence and no LOB while seated edge of bed/standing. 2.  Patient will perform upper body dressing with modified independence and no LOB while seated edge of bed. 3.  Patient will perform toilet transfers with modified independence and use of LRAD. 4.  Patient will perform all aspects of toileting with modified independence. 5.  Patient will participate in upper extremity therapeutic exercise/activities with modified independence for 8 minutes. 6.  Patient will perform simple grooming in stance with MI and Fair balance using LRAD for support as needed. Outcome: Progressing Towards Goal 
Occupational Therapy TREATMENT Patient: Castillo Millan (4 y.o. female) Date: 10/12/2018 Diagnosis: Community acquired pneumonia SOB (shortness of breath) Acute bronchitis <principal problem not specified> Precautions: Fall, Aspiration Chart, occupational therapy assessment, plan of care, and goals were reviewed. ASSESSMENT: 
Pt reports she would like to walk to the BR to brush teeth. Upon std w/HHA pt required CGA to maneuver to the BR and SBA to perform toilet txfr and toileting hygiene. Following toileting tasks pt was able to std sinkside w/SBA for mult ADL grooming tasks. Pt tolerated std for ~ 10 min, following which maneuvered to the chair, requiring CGA for safety as pt had 1LOB from which she was not able to recover independently, requiring Min A to safely sit on EOB. Assisted pt to the chair, where she left comfortable w/call bell within reach. Pt educated on the importance of calling for help when needed to get up for any reason to increase safety and prevent falls. Pt verbalized understanding. Progression toward goals: [x]          Improving appropriately and progressing toward goals 
[]          Improving slowly and progressing toward goals 
[]          Not making progress toward goals and plan of care will be adjusted PLAN: 
Patient continues to benefit from skilled intervention to address the above impairments. Continue treatment per established plan of care. Discharge Recommendations:  Home Health with Supervision Further Equipment Recommendations for Discharge:  bedside commode and shower chair G-CODES:  
 
Self Care  Current  CJ= 20-39%  Goal  CI= 1-19%. The severity rating is based on the Level of Assistance required for Functional Mobility and ADLs. SUBJECTIVE:  
Patient stated I just can't eat. That makes me so weak.  OBJECTIVE DATA SUMMARY:  
Cognitive/Behavioral Status: 
Neurologic State: Alert Orientation Level: Oriented X4 Cognition: Follows commands Safety/Judgement: Fall prevention Functional Mobility and Transfers for ADLs: 
 Bed Mobility: 
  
Supine to Sit: Supervision Scooting: Supervision Transfers: 
Sit to Stand: Stand-by assistance Toilet Transfer : Stand-by assistance (w/HHA) Balance: 
Sitting: Intact Standing: Impaired; With support Standing - Static: Fair Standing - Dynamic : Fair ADL Intervention: 
 Grooming Grooming Assistance: Stand-by assistance (std sinkside) Washing Face: Stand-by assistance Washing Hands: Stand-by assistance Brushing Teeth: Stand-by assistance Brushing/Combing Hair: Stand-by assistance Toileting Bladder Hygiene: Supervision/set-up Pain: 
Pt reports 0/10 pain or discomfort prior to treatment.   
Pt reports 0/10 pain or discomfort post treatment. Activity Tolerance:   
Fair Please refer to the flowsheet for vital signs taken during this treatment. After treatment:  
[x]  Patient left in no apparent distress sitting up in chair 
[]  Patient left in no apparent distress in bed 
[x]  Call bell left within reach [x]  Nursing notified 
[]  Caregiver present 
[]  Bed alarm activated Euel Fonder, RED/L Time Calculation: 39 mins

## 2018-10-12 NOTE — PROGRESS NOTES
[email or web address] 
[phone] Gastroenterology follow up-Progress note Impression: 1. Dysphagia - likely motility disorder, denies ondynophagia, tolerating soft diet well, denies pill dysphagia 2. PNA, ? Bronchitis - ?phlegm contributing to swallowing issues, seems to have more productive cough now 3. HTN 4. DM 
5. ZACKARY 6. Hypothyroidism Plan: 1. Conservative management, slow improvement with speech therapy, will continue to monitor if no improvement with SLP will pursue EGD/dilate 2. Soft diet per speech 3. Continue current management Chief Complaint: Dysphagia Subjective:  Notes increase in phlegm production, getting stuck in her throat, intermittent nausea Eyes: conjunctiva normal, EOM normal  
Neck: ROM normal, supple and trachea normal  
Cardiovascular: heart normal, intact distal pulses, normal rate and regular rhythm Pulmonary/Chest Wall: breath sounds normal and effort normal  
Abdominal: appearance normal, bowel sounds normal and soft, non-acute, non-tender Patient Active Problem List  
Diagnosis Code  Bloody discharge from right nipple N64.52  
 Acute bronchitis J20.9  SOB (shortness of breath) R06.02  
 Community acquired pneumonia J18.9 Visit Vitals  /74 (BP 1 Location: Left arm, BP Patient Position: At rest)  Pulse 72  Temp 97.9 °F (36.6 °C)  Resp 18  Ht 5' 3\" (1.6 m)  Wt 60.9 kg (134 lb 3.2 oz)  SpO2 98%  Breastfeeding No  
 BMI 23.77 kg/m2 Intake/Output Summary (Last 24 hours) at 10/12/18 8580 Last data filed at 10/12/18 0335 Gross per 24 hour Intake                0 ml Output             1450 ml Net            -1450 ml CBC w/Diff Lab Results Component Value Date/Time  WBC 7.4 10/11/2018 02:32 AM  
 RBC 4.27 10/11/2018 02:32 AM  
 HGB 13.6 10/11/2018 02:32 AM  
 HCT 41.0 10/11/2018 02:32 AM  
 MCV 96.0 10/11/2018 02:32 AM  
 MCH 31.9 10/11/2018 02:32 AM  
 MCHC 33.2 10/11/2018 02:32 AM  
 RDW 14.0 10/11/2018 02:32 AM  
  10/11/2018 02:32 AM  
 Lab Results Component Value Date/Time GRANS 65 10/11/2018 02:32 AM  
 LYMPH 22 10/11/2018 02:32 AM  
 EOS 1 10/11/2018 02:32 AM  
 BASOS 1 10/11/2018 02:32 AM  
  
Basic Metabolic Profile Recent Labs 10/11/18 
 0232 NA  138  
K  4.0  
CL  104 CO2  24 BUN  14  
CA  9.1 Hepatic Function Lab Results Component Value Date/Time ALB 3.8 10/05/2018 07:10 AM  
 TP 7.5 10/05/2018 07:10 AM  
 AP 87 10/05/2018 07:10 AM  
 Lab Results Component Value Date/Time SGOT 33 10/05/2018 07:10 AM  
  
 
 
Coags No results for input(s): PTP, INR, APTT in the last 72 hours. No lab exists for component: [facility]. [email or web address] Phone: [phone] Pager: [phone]

## 2018-10-12 NOTE — ROUTINE PROCESS
Received bedside report from Farideh Vitale, resting in bed, bed in lowest position, HOB elevated, and oriented to call button. Gave bedside report to Christina Frankel RN, using SBAR, MAR, and Kardex.

## 2018-10-12 NOTE — PROGRESS NOTES
Problem: Dysphagia (Adult) Goal: *Acute Goals and Plan of Care (Insert Text) Recommendations: 
Diet: Mechanicial soft, ground meat/ with nectar-thick liquids Meds: Per patient preference Aspiration Precautions Oral Care TID Other: Small sips/bites, alternate solid/liquid, DOUBLE SWALLOW WITH ALL PO, GI consult Goals:  Patient will: 1. Tolerate PO trials with 0 s/s overt distress in 4/5 trials 2. Utilize compensatory swallow strategies/maneuvers (decrease bite/sip, size/rate, alt. liq/sol) with min cues in 4/5 trials 3. Perform oral-motor/laryngeal exercises to increase oropharyngeal swallow function with min cues 4. Complete an objective swallow study (i.e., MBSS) to assess swallow integrity, r/o aspiration, and determine of safest LRD, min A - met 10/8/18 Outcome: Not Met Speech language pathology dysphagia treatment Patient: Rhoda Redding (9 y.o. female) Date: 10/12/2018 Diagnosis: Community acquired pneumonia SOB (shortness of breath) Acute bronchitis <principal problem not specified> Precautions:  Fall, Aspiration ASSESSMENT: 
Pt seen b/s for dysphagia tx, laryngeal strengthening exercises, handout provided. Pt able to complete 20 reps of modified Joey exercises with towel roll, attempted [place], 2/5 reps. Educated pt to jaw thrust and effortful swallow exercise. Pt able to complete 10- 20 reps with mod visual/ verbal cues. Encouraged pt to complete exercises several times a day, after meals. Comprehension expressed. SLP will continue to follow. Progression toward goals: 
[]         Improving appropriately and progressing toward goals [x]         Improving slowly and progressing toward goals 
[]         Not making progress toward goals and plan of care will be adjusted PLAN: 
Recommendations and Planned Interventions: 
Continue diet as ordered Patient continues to benefit from skilled intervention to address the above impairments. Continue treatment per established plan of care. Discharge Recommendations:  Home Health, Outpatient and [facility] SUBJECTIVE:  
Patient stated I am getting tired of coughing up mucus. OBJECTIVE:  
Cognitive and Communication Status: 
Neurologic State: Alert Orientation Level: Oriented X4 Cognition: Follows commands Perception: Appears intact Perseveration: No perseveration noted Safety/Judgement: Fall prevention Dysphagia Treatment: 
Oral Assessment: 
Oral Assessment Labial: No impairment Dentition: Intact Oral Hygiene: good Lingual: No impairment Velum: No impairment Mandible: No impairment P.O. Trials: 
 Patient Position: [address] Vocal quality prior to P.O.: Hoarse Consistency Presented: Nectar thick liquid How Presented: Self-fed/presented, Cup/sip Bolus Acceptance: No impairment Bolus Formation/Control: No impairment Propulsion: No impairment Oral Residue: None Initiation of Swallow: Delayed (# of seconds) Laryngeal Elevation: Decreased Aspiration Signs/Symptoms: Change vocal quality, Weak cough Pharyngeal Phase Characteristics: Altered vocal quality, Suspected pharyngeal residue Effective Modifications: Double swallow, Small sips and bites Cues for Modifications: Minimal-moderate Oral Phase Severity: Mild Pharyngeal Phase Severity : Moderate Exercises: 
Laryngeal Exercises: 
 
Lexus: Yes Sets : 1 Reps : 5 (able to do x2, attempted x5) Shaker: Yes Sets : 2 Reps : 20 Look Up at Ceiling/Gargle: Yes Sets : 2 Reps : 10 PAIN: 
Start of Tx: 0 End of Tx: 0 After treatment:  
[]              Patient left in no apparent distress sitting up in chair 
[x]              Patient left in no apparent distress in bed 
[x]              Call bell left within reach [x]              Nursing notified 
[]              Family present 
[]              Caregiver present []              Bed alarm activated COMMUNICATION/EDUCATION:  
[x] Aspiration precautions; swallow safety; compensatory techniques []        Patient unable to participate in education; education ongoing with staff 
[]  Posted safety precautions in patient's room. [x] Oral-motor/laryngeal strengthening exercises Ra Neal MS, CCC/SLP Time Calculation: 21 mins

## 2018-10-12 NOTE — PROGRESS NOTES
Problem: Mobility Impaired (Adult and Pediatric) Goal: *Acute Goals and Plan of Care (Insert Text) Physical Therapy Goals Initiated 10/8/2018 and to be accomplished within 7 day(s) 1. Patient will move from supine to sit and sit to supine  and scoot up and down in bed with supervision/set-up. 2.  Patient will transfer from bed to chair and chair to bed with supervision/set-up using the least restrictive device. 3.  Patient will perform sit to stand with supervision/set-up. 4.  Patient will ambulate with supervision/set-up for >100 feet with the least restrictive device. 5.  Patient will ascend/descend 2 stairs with 1-2 handrail(s) with minimal assistance/contact guard assist.  
 
1423- Pt is to be discharged today and would like to conserve energy for her return home. Will f/u at later date if pt doll snot DC from [facility].

## 2018-10-13 ENCOUNTER — HOME CARE VISIT (OUTPATIENT)
Dept: SCHEDULING | Facility: HOME HEALTH | Age: 83
End: 2018-10-13
Payer: MEDICARE

## 2018-10-13 PROCEDURE — 400013 HH SOC

## 2018-10-13 PROCEDURE — G0299 HHS/HOSPICE OF RN EA 15 MIN: HCPCS

## 2018-10-13 NOTE — DISCHARGE SUMMARY
[address] [place] [address] Dr    DISCHARGE SUMMARY    Phillip Whitmore  MR#: [id number]  : 1926  ACCOUNT #: [de-identified]   ADMIT DATE: 10/05/2018  DISCHARGE DATE: 10/12/2018    PRIMARY CARE PHYSICIAN:  Carlton Gibbs MD     DISPOSITION:  Discharge to home with home health care. DISCHARGE CONDITION:  Stable. DISCHARGE DIAGNOSES:  1. Acute bronchitis versus concerning for pneumonia. 2.  Dysphagia. 3.  Hypertension. 4.  Dyslipidemia. 5.  Diabetes mellitus type 2.  6.  Acute renal failure, resolved now. 7.  Chronic renal disease, stage II.  8.  Hypothyroidism. 5.  Advanced age. 10.  Concern for gastroesophageal reflux disease. DISCHARGE MEDICATIONS:   Allopurinol 150 mg daily, amlodipine 2.5 mg daily, Tessalon capsules 1 capsule 3 times daily, Colace 100 mg b.i.d. p.r.n., Pepcid 20 mg b.i.d., Amaryl 1 mg daily, Robitussin 5 mL every 4 hours p.r.n. for coughing, Floranex 2 tablets b.i.d., levothyroxine 72 mg every 48 hours for 4 more days, Synthroid 88 mcg daily, Mevacor or lovastatin 40 mg daily, metformin 750 mg daily, metoprolol 50 mg b.i.d., Nystatin 10 mL twice daily, Chloraseptic spray to the throat as needed, vitamin B12 1000 mcg daily, vitamin E 400 mg daily, vitamin D3 1000 international units orally daily. CONSULTATIONS:  Dr. Monica Farfan, gastroenterology. MAJOR INVESTIGATIONS DURING HOSPITAL STAY:  1. The patient had a CT neck, which showed no evidence of opaque foreign body or soft tissue in the larynx. Otherwise, no significant abnormalities noted. 2.  The patient had a modified barium swallow which showed moderate laryngeal penetration with thin barium. Otherwise no aspiration noted. 3.  The patient also had a chest x-ray which showed some concern for a left base infiltrate or atelectasis. A repeat x-ray done today is within normal limits.     HOSPITAL COURSE:  This is a 1-year-old  female who presents to the emergency room with shortness of breath, was noted to have concerning for left lower lobe pneumonia, bronchitis, was admitted to the hospital, was started on empiric antibiotics. Speech and swallow therapist was also consulted. The patient did have some complaints of dysphagia. Speech swallow recommended GI consultation. GI saw the patient and thought that the patient is tolerating diet, recommended speech therapist to continue. Patient had a slow improvement in the hospital,  had a modified barium swallow which showed some penetration with thin liquids, so liquids were changed to nectar thick. Patient continued to have some cough with minimal sputum production, but no fevers or white count. Patient continued to complain of cough. GI followed up on the patient. Since the patient was tolerating diet, they did not think endoscopy would be a good choice given her advanced age. Patient did have some on and off complaints about the dysphagia, but she has been tolerating diet, but she complains of dysphagia is mostly when she tries to swallow her saliva. GI recommended that since the patient tolerating diet, would recommend conservative treatment given her advanced age. I discussed with the patient and also with her son, Flory Lora, very detailed about the further plan of care with conservative treatment with aggressive speech therapy and lifestyle modification versus aggressive therapy with EGD and also risks and benefits with EGD. Patient and family did not want the EGD and they want to continue conservative treatment with aggressive care from a swallow therapist.  Patient finished the antibiotics in the hospital, but she continued to have some cough, so I did put her on some Zithromax to cover atypicals which was started yesterday. The patient had a repeat chest x-ray today, which actually looks much better. I do not see any signs of infiltrate now. The patient's symptomatology could be related to her GERD as she is having frequent coughs.   I did start her on some Pepcid. Patient was weaned off oxygen. She was made to ambulate in the hallway without any oxygen. Her sats remained 92% on exertion and 96-98% on room air at rest.  All the cultures were negative during the admission. Patient is currently medically stable for discharge. The patient will be discharged to home with outpatient followup. Discharge instructions, followup appointments and physical exam, please refer to the previous discharge summary. Patient is alert, awake, oriented x3. I discussed with the patient and also with her son, Matthew Mcnulty, over the phone in detail about the discharge plan and followup appointment. I also discussed with him about conservative treatment versus aggressive treatment. They did not want an endoscopy or any aggressive treatment. They want to continue conservative treatment.   Speech and swallow therapist will be followed up with the home health care and patient will be seen by PT, OT.    DISPOSITION:  Home with Miranda Johansen MD       BT/LN  D: 10/12/2018 16:40     T: 10/13/2018 12:13  JOB #: [id number]  CC: Geovanna Baker MD  CC: Kimberly Sprague MD

## 2018-10-14 VITALS
DIASTOLIC BLOOD PRESSURE: 50 MMHG | SYSTOLIC BLOOD PRESSURE: 100 MMHG | RESPIRATION RATE: 20 BRPM | OXYGEN SATURATION: 98 % | HEART RATE: 72 BPM

## 2018-10-15 ENCOUNTER — HOME CARE VISIT (OUTPATIENT)
Dept: HOME HEALTH SERVICES | Facility: HOME HEALTH | Age: 83
End: 2018-10-15
Payer: MEDICARE

## 2018-10-15 ENCOUNTER — HOME CARE VISIT (OUTPATIENT)
Dept: SCHEDULING | Facility: HOME HEALTH | Age: 83
End: 2018-10-15
Payer: MEDICARE

## 2018-10-15 PROCEDURE — G0151 HHCP-SERV OF PT,EA 15 MIN: HCPCS

## 2018-10-16 ENCOUNTER — HOME CARE VISIT (OUTPATIENT)
Dept: SCHEDULING | Facility: HOME HEALTH | Age: 83
End: 2018-10-16
Payer: MEDICARE

## 2018-10-16 VITALS
OXYGEN SATURATION: 97 % | HEART RATE: 68 BPM | TEMPERATURE: 98.2 F | SYSTOLIC BLOOD PRESSURE: 132 MMHG | DIASTOLIC BLOOD PRESSURE: 80 MMHG

## 2018-10-16 VITALS
TEMPERATURE: 97.7 F | HEART RATE: 67 BPM | DIASTOLIC BLOOD PRESSURE: 78 MMHG | OXYGEN SATURATION: 97 % | SYSTOLIC BLOOD PRESSURE: 150 MMHG

## 2018-10-16 VITALS
HEART RATE: 67 BPM | SYSTOLIC BLOOD PRESSURE: 148 MMHG | TEMPERATURE: 45.3 F | RESPIRATION RATE: 22 BRPM | DIASTOLIC BLOOD PRESSURE: 77 MMHG | OXYGEN SATURATION: 97 %

## 2018-10-16 PROCEDURE — G0300 HHS/HOSPICE OF LPN EA 15 MIN: HCPCS

## 2018-10-16 PROCEDURE — G0153 HHCP-SVS OF S/L PATH,EA 15MN: HCPCS

## 2018-10-18 ENCOUNTER — HOME CARE VISIT (OUTPATIENT)
Dept: SCHEDULING | Facility: HOME HEALTH | Age: 83
End: 2018-10-18
Payer: MEDICARE

## 2018-10-18 VITALS
RESPIRATION RATE: 18 BRPM | DIASTOLIC BLOOD PRESSURE: 78 MMHG | TEMPERATURE: 97.8 F | SYSTOLIC BLOOD PRESSURE: 110 MMHG | HEART RATE: 88 BPM | OXYGEN SATURATION: 97 %

## 2018-10-18 VITALS
TEMPERATURE: 98.8 F | DIASTOLIC BLOOD PRESSURE: 62 MMHG | SYSTOLIC BLOOD PRESSURE: 128 MMHG | HEART RATE: 70 BPM | OXYGEN SATURATION: 98 %

## 2018-10-18 PROCEDURE — G0299 HHS/HOSPICE OF RN EA 15 MIN: HCPCS

## 2018-10-18 PROCEDURE — G0157 HHC PT ASSISTANT EA 15: HCPCS

## 2018-10-18 PROCEDURE — G0153 HHCP-SVS OF S/L PATH,EA 15MN: HCPCS

## 2018-10-21 VITALS
DIASTOLIC BLOOD PRESSURE: 62 MMHG | SYSTOLIC BLOOD PRESSURE: 128 MMHG | HEART RATE: 70 BPM | OXYGEN SATURATION: 98 % | TEMPERATURE: 98.8 F

## 2018-10-22 ENCOUNTER — HOME CARE VISIT (OUTPATIENT)
Dept: SCHEDULING | Facility: HOME HEALTH | Age: 83
End: 2018-10-22
Payer: MEDICARE

## 2018-10-22 VITALS
DIASTOLIC BLOOD PRESSURE: 82 MMHG | SYSTOLIC BLOOD PRESSURE: 127 MMHG | TEMPERATURE: 97.6 F | HEART RATE: 73 BPM | RESPIRATION RATE: 16 BRPM | OXYGEN SATURATION: 96 %

## 2018-10-22 PROCEDURE — G0299 HHS/HOSPICE OF RN EA 15 MIN: HCPCS

## 2018-10-23 ENCOUNTER — HOME CARE VISIT (OUTPATIENT)
Dept: SCHEDULING | Facility: HOME HEALTH | Age: 83
End: 2018-10-23
Payer: MEDICARE

## 2018-10-23 VITALS
SYSTOLIC BLOOD PRESSURE: 112 MMHG | DIASTOLIC BLOOD PRESSURE: 60 MMHG | OXYGEN SATURATION: 98 % | HEART RATE: 76 BPM | TEMPERATURE: 98.4 F

## 2018-10-23 PROCEDURE — G0157 HHC PT ASSISTANT EA 15: HCPCS

## 2018-10-23 PROCEDURE — G0153 HHCP-SVS OF S/L PATH,EA 15MN: HCPCS

## 2018-10-24 VITALS
SYSTOLIC BLOOD PRESSURE: 120 MMHG | HEART RATE: 74 BPM | OXYGEN SATURATION: 97 % | TEMPERATURE: 98.6 F | DIASTOLIC BLOOD PRESSURE: 76 MMHG

## 2018-10-25 ENCOUNTER — HOME CARE VISIT (OUTPATIENT)
Dept: SCHEDULING | Facility: HOME HEALTH | Age: 83
End: 2018-10-25
Payer: MEDICARE

## 2018-10-25 PROCEDURE — G0299 HHS/HOSPICE OF RN EA 15 MIN: HCPCS

## 2018-10-25 PROCEDURE — G0157 HHC PT ASSISTANT EA 15: HCPCS

## 2018-10-25 PROCEDURE — G0153 HHCP-SVS OF S/L PATH,EA 15MN: HCPCS

## 2018-10-26 VITALS
DIASTOLIC BLOOD PRESSURE: 62 MMHG | OXYGEN SATURATION: 97 % | HEART RATE: 78 BPM | TEMPERATURE: 98.1 F | SYSTOLIC BLOOD PRESSURE: 128 MMHG

## 2018-10-26 VITALS
OXYGEN SATURATION: 97 % | HEART RATE: 82 BPM | TEMPERATURE: 97.8 F | RESPIRATION RATE: 16 BRPM | DIASTOLIC BLOOD PRESSURE: 76 MMHG | SYSTOLIC BLOOD PRESSURE: 124 MMHG

## 2018-10-26 VITALS
DIASTOLIC BLOOD PRESSURE: 74 MMHG | OXYGEN SATURATION: 98 % | HEART RATE: 76 BPM | TEMPERATURE: 98.4 F | SYSTOLIC BLOOD PRESSURE: 118 MMHG

## 2018-10-29 ENCOUNTER — HOME CARE VISIT (OUTPATIENT)
Dept: SCHEDULING | Facility: HOME HEALTH | Age: 83
End: 2018-10-29
Payer: MEDICARE

## 2018-10-29 PROCEDURE — G0157 HHC PT ASSISTANT EA 15: HCPCS

## 2018-10-30 ENCOUNTER — HOME CARE VISIT (OUTPATIENT)
Dept: SCHEDULING | Facility: HOME HEALTH | Age: 83
End: 2018-10-30
Payer: MEDICARE

## 2018-10-30 PROCEDURE — G0153 HHCP-SVS OF S/L PATH,EA 15MN: HCPCS

## 2018-10-31 VITALS
OXYGEN SATURATION: 98 % | SYSTOLIC BLOOD PRESSURE: 122 MMHG | HEART RATE: 78 BPM | TEMPERATURE: 98.4 F | DIASTOLIC BLOOD PRESSURE: 72 MMHG

## 2018-10-31 VITALS
TEMPERATURE: 97.7 F | OXYGEN SATURATION: 98 % | HEART RATE: 71 BPM | SYSTOLIC BLOOD PRESSURE: 98 MMHG | DIASTOLIC BLOOD PRESSURE: 54 MMHG

## 2018-11-01 ENCOUNTER — HOME CARE VISIT (OUTPATIENT)
Dept: SCHEDULING | Facility: HOME HEALTH | Age: 83
End: 2018-11-01
Payer: MEDICARE

## 2018-11-01 VITALS
HEART RATE: 80 BPM | TEMPERATURE: 98 F | SYSTOLIC BLOOD PRESSURE: 118 MMHG | OXYGEN SATURATION: 98 % | DIASTOLIC BLOOD PRESSURE: 50 MMHG

## 2018-11-01 PROCEDURE — G0157 HHC PT ASSISTANT EA 15: HCPCS

## 2018-11-01 PROCEDURE — G0153 HHCP-SVS OF S/L PATH,EA 15MN: HCPCS

## 2018-11-02 ENCOUNTER — HOME CARE VISIT (OUTPATIENT)
Dept: HOME HEALTH SERVICES | Facility: HOME HEALTH | Age: 83
End: 2018-11-02
Payer: MEDICARE

## 2018-11-02 VITALS
SYSTOLIC BLOOD PRESSURE: 120 MMHG | DIASTOLIC BLOOD PRESSURE: 74 MMHG | HEART RATE: 78 BPM | TEMPERATURE: 98.4 F | OXYGEN SATURATION: 96 %

## 2018-11-02 PROCEDURE — G0152 HHCP-SERV OF OT,EA 15 MIN: HCPCS

## 2018-11-05 ENCOUNTER — HOME CARE VISIT (OUTPATIENT)
Dept: SCHEDULING | Facility: HOME HEALTH | Age: 83
End: 2018-11-05
Payer: MEDICARE

## 2018-11-05 PROCEDURE — G0157 HHC PT ASSISTANT EA 15: HCPCS

## 2018-11-07 ENCOUNTER — HOME CARE VISIT (OUTPATIENT)
Dept: SCHEDULING | Facility: HOME HEALTH | Age: 83
End: 2018-11-07
Payer: MEDICARE

## 2018-11-07 VITALS
SYSTOLIC BLOOD PRESSURE: 116 MMHG | DIASTOLIC BLOOD PRESSURE: 66 MMHG | OXYGEN SATURATION: 97 % | TEMPERATURE: 97.4 F | HEART RATE: 72 BPM

## 2018-11-07 PROCEDURE — G0151 HHCP-SERV OF PT,EA 15 MIN: HCPCS

## 2018-11-09 VITALS
TEMPERATURE: 97.1 F | HEART RATE: 71 BPM | SYSTOLIC BLOOD PRESSURE: 115 MMHG | OXYGEN SATURATION: 97 % | DIASTOLIC BLOOD PRESSURE: 70 MMHG

## 2018-11-18 PROCEDURE — 87338 HPYLORI STOOL AG IA: CPT

## 2018-11-19 ENCOUNTER — HOSPITAL ENCOUNTER (OUTPATIENT)
Dept: LAB | Age: 83
Discharge: HOME | End: 2018-11-18
Payer: MEDICARE

## 2018-11-21 LAB
H PYLORI AG STL QL IA: NEGATIVE
SPECIMEN SOURCE: NORMAL

## 2019-01-10 ENCOUNTER — HOSPITAL ENCOUNTER (OUTPATIENT)
Dept: CT IMAGING | Age: 84
Discharge: HOME | End: 2019-01-10
Attending: PHYSICIAN ASSISTANT
Payer: MEDICARE

## 2019-01-10 DIAGNOSIS — J38.01 PARALYSIS OF VOCAL CORDS AND LARYNX, UNILATERAL: ICD-10-CM

## 2019-01-10 LAB — CREAT UR-MCNC: 1.1 MG/DL (ref 0.6–1.3)

## 2019-01-10 PROCEDURE — 71260 CT THORAX DX C+: CPT

## 2019-01-10 PROCEDURE — 82565 ASSAY OF CREATININE: CPT

## 2019-01-10 PROCEDURE — 74011636320 HC RX REV CODE- 636/320

## 2019-01-10 RX ADMIN — IOPAMIDOL 70 ML: 612 INJECTION, SOLUTION INTRAVENOUS at 10:25

## 2019-03-22 ENCOUNTER — HOSPITAL ENCOUNTER (OUTPATIENT)
Dept: GENERAL RADIOLOGY | Age: 84
Discharge: HOME | End: 2019-03-22
Attending: PHYSICIAN ASSISTANT
Payer: MEDICARE

## 2019-03-22 DIAGNOSIS — R13.10 DYSPHAGIA: ICD-10-CM

## 2019-03-22 DIAGNOSIS — R13.10 DYSPHAGIA, UNSPECIFIED TYPE: ICD-10-CM

## 2019-03-22 PROCEDURE — 92611 MOTION FLUOROSCOPY/SWALLOW: CPT

## 2019-03-22 PROCEDURE — 74011000255 HC RX REV CODE- 255

## 2019-03-22 PROCEDURE — 74230 X-RAY XM SWLNG FUNCJ C+: CPT

## 2019-03-22 RX ADMIN — BARIUM SULFATE 700 MG: 700 TABLET ORAL at 13:41

## 2019-03-22 RX ADMIN — BARIUM SULFATE 30 ML: 400 PASTE ORAL at 13:41

## 2019-03-22 RX ADMIN — BARIUM SULFATE 30 G: 960 POWDER, FOR SUSPENSION ORAL at 13:41

## 2019-03-22 NOTE — PROGRESS NOTES
Outpatient Modified Barium Swallow Evaluation    Patient: Bambi Hartmann (89 y.o. female)  Date: 3/22/2019  Primary Diagnosis: Dysphagia [R13.10]       Precautions: Aspiration    Recommend:  Regular with thin liquids  Meds as tolerated  Aspiration precautions (small sips/bites, alt solid/liquids)  Oral care TID    Videofluoroscopy Results  Based on objective measures, pt presents with oropharyngeal function essentially WFL. Pt alert and able to follow all commands throughout eval. Pt tolerated thin liquid +/- straw, puree, solid, and 13 mm Ba pill with thin liquid +straw with timely swallow, functional epiglottic inversion, and adequate laryngeal vestibule closure with no evidence of penetration/aspiration. Deficits include decreased pharyngeal motility and sensation, with mod vallecular and pyriform sinus residue with thin liquid +/- straw. Pt able to clear residue with dry swallow while maintaining positive airway protection. Rec regular diet with thin liquids, aspiration precautions, meds as tolerated, and oral care TID. Discussed with pt, verbalized understanding. Video Flouroscopic Procedures  [x] Lateral View   [] A-P View [] Scanned to level of Sternum    [x] Seated at 90 deg.   [] Other:    Presentation:    [x] Spoon   [x] Cup   [x] Straw   [] Syringe   [] Consecutive Swallows  [] Other:     Consistencies:   [x] Ba+ liquid    [] Ba+ liquid (nectar)    [] Ba+ liquid (honey)    [x] Ba+ puree   [x] Ba+ cookie  [x] 13 mm Ba pill with thin liquid Ba wash      Treatment Techniques Attempted: N/A  [] Head Turn: [] Right [] Left     [] Head Tilt: [] Right [] Left     [] Chin Down:  [] Small Sips/bites:  [] Effortful swallow:  [] Double swallow:  [] Other:    Results  Dysphagia Present:     [] Yes  [x] No    Ratings of Dysphagia: N/A   [] Mild  [] Moderate  [] Severe    Stages of Breakdown: N/A   [] Oral  [] Pharyngeal  [] Esophageal    Aspiration:   [] Yes    [x] No  [] At Risk     Cough: [] Yes      [] No Penetration:   [] Yes    [x] No     Cough: [] Yes      [] No   [] Flash/trace   [] Mod   [] To Chords          Consistency Aspirated:N/A   Consistency Penetrated: N/A  [] Thin Liquid     [] Thin Liquid  [] Nectar-thick Liquid    [] Nectar-thick Liquid   [] Honey-thick Liquid    [] Honey-thick Liquid   [] Puree      [] Puree  [] Solid      [] Solid  [] 13 mm Ba pill with     [] 13 mm Ba pill with      Motility Problems with:  [] Lip Closure:    [] Mastication:   [] Bolus Formation/control:   [] A-P Transport:  [] Tongue Base Retraction:  [] Swallow Response (delayed):  [] Velopharyngeal Closure:  [] Pharyngeal Aspirations:  [] Laryngeal Elevation/adduction:  [] Epiglottic Inversion:  [x] Pharyngeal motility/sensation:  [] Cricopharyngeal Relaxation:  [] Esophageal Peristalsis:  [] Other:    Timing of Aspiration/Penetration: N/A  [] Before Swallow:  [] During Swallow:  [] After Swallow:    Transit Time Delay: N/A  [] >1 Second  Oral  [] >1 Second Pharyngeal  [] >20 Second Esophageal     Residuals:  [x] Vallecula:    [] Mild  [x] Mod  [] Severe  [x] Pyriform Sinus:   [] Mild  [x] Mod  [] Severe  [] Posterior Pharyngeal Wall:  [] Mild  [] Mod  [] Severe      Thank you for this referral,   Gita Buitrago, SLP intern

## 2019-07-29 RX ORDER — ROPINIROLE 0.5 MG/1
0.5 TABLET, FILM COATED ORAL DAILY
COMMUNITY

## 2019-08-01 ENCOUNTER — ANESTHESIA EVENT (OUTPATIENT)
Dept: ENDOSCOPY | Age: 84
End: 2019-08-01
Payer: MEDICARE

## 2019-08-02 ENCOUNTER — ANESTHESIA (OUTPATIENT)
Dept: ENDOSCOPY | Age: 84
End: 2019-08-02
Payer: MEDICARE

## 2019-10-21 ENCOUNTER — HOSPITAL ENCOUNTER (OUTPATIENT)
Age: 84
Setting detail: OUTPATIENT SURGERY
Discharge: HOME | End: 2019-10-21
Attending: INTERNAL MEDICINE | Admitting: INTERNAL MEDICINE
Payer: MEDICARE

## 2019-10-21 VITALS
BODY MASS INDEX: 24.15 KG/M2 | SYSTOLIC BLOOD PRESSURE: 132 MMHG | WEIGHT: 131.25 LBS | HEART RATE: 64 BPM | TEMPERATURE: 97.5 F | RESPIRATION RATE: 16 BRPM | DIASTOLIC BLOOD PRESSURE: 63 MMHG | HEIGHT: 62 IN | OXYGEN SATURATION: 99 %

## 2019-10-21 LAB
GLUCOSE BLD STRIP.AUTO-MCNC: 136 MG/DL (ref 70–110)
GLUCOSE BLD STRIP.AUTO-MCNC: 144 MG/DL (ref 70–110)

## 2019-10-21 PROCEDURE — 82962 GLUCOSE BLOOD TEST: CPT

## 2019-10-21 PROCEDURE — 74011250636 HC RX REV CODE- 250/636: Performed by: NURSE ANESTHETIST, CERTIFIED REGISTERED

## 2019-10-21 PROCEDURE — 77030021593 HC FCPS BIOP ENDOSC BSC -A: Performed by: INTERNAL MEDICINE

## 2019-10-21 PROCEDURE — 76060000031 HC ANESTHESIA FIRST 0.5 HR: Performed by: INTERNAL MEDICINE

## 2019-10-21 PROCEDURE — 74011000250 HC RX REV CODE- 250: Performed by: NURSE ANESTHETIST, CERTIFIED REGISTERED

## 2019-10-21 PROCEDURE — 77030008565 HC TBNG SUC IRR ERBE -B: Performed by: INTERNAL MEDICINE

## 2019-10-21 PROCEDURE — 88305 TISSUE EXAM BY PATHOLOGIST: CPT

## 2019-10-21 PROCEDURE — 77030018846 HC SOL IRR STRL H20 ICUM -A: Performed by: INTERNAL MEDICINE

## 2019-10-21 PROCEDURE — 76040000019: Performed by: INTERNAL MEDICINE

## 2019-10-21 RX ORDER — SODIUM CHLORIDE 0.9 % (FLUSH) 0.9 %
5-40 SYRINGE (ML) INJECTION EVERY 8 HOURS
Status: DISCONTINUED | OUTPATIENT
Start: 2019-10-21 | End: 2019-10-21 | Stop reason: HOSPADM

## 2019-10-21 RX ORDER — DEXTROSE 50 % IN WATER (D50W) INTRAVENOUS SYRINGE
25-50 AS NEEDED
Status: DISCONTINUED | OUTPATIENT
Start: 2019-10-21 | End: 2019-10-21 | Stop reason: HOSPADM

## 2019-10-21 RX ORDER — SODIUM CHLORIDE 0.9 % (FLUSH) 0.9 %
5-40 SYRINGE (ML) INJECTION AS NEEDED
Status: DISCONTINUED | OUTPATIENT
Start: 2019-10-21 | End: 2019-10-21 | Stop reason: HOSPADM

## 2019-10-21 RX ORDER — LIDOCAINE HYDROCHLORIDE 20 MG/ML
INJECTION, SOLUTION EPIDURAL; INFILTRATION; INTRACAUDAL; PERINEURAL AS NEEDED
Status: DISCONTINUED | OUTPATIENT
Start: 2019-10-21 | End: 2019-10-21 | Stop reason: HOSPADM

## 2019-10-21 RX ORDER — MAGNESIUM SULFATE 100 %
4 CRYSTALS MISCELLANEOUS AS NEEDED
Status: DISCONTINUED | OUTPATIENT
Start: 2019-10-21 | End: 2019-10-21 | Stop reason: HOSPADM

## 2019-10-21 RX ORDER — PROPOFOL 10 MG/ML
INJECTION, EMULSION INTRAVENOUS AS NEEDED
Status: DISCONTINUED | OUTPATIENT
Start: 2019-10-21 | End: 2019-10-21 | Stop reason: HOSPADM

## 2019-10-21 RX ORDER — INSULIN LISPRO 100 [IU]/ML
INJECTION, SOLUTION INTRAVENOUS; SUBCUTANEOUS ONCE
Status: DISCONTINUED | OUTPATIENT
Start: 2019-10-21 | End: 2019-10-21 | Stop reason: HOSPADM

## 2019-10-21 RX ORDER — SODIUM CHLORIDE, SODIUM LACTATE, POTASSIUM CHLORIDE, CALCIUM CHLORIDE 600; 310; 30; 20 MG/100ML; MG/100ML; MG/100ML; MG/100ML
75 INJECTION, SOLUTION INTRAVENOUS CONTINUOUS
Status: DISCONTINUED | OUTPATIENT
Start: 2019-10-21 | End: 2019-10-21 | Stop reason: HOSPADM

## 2019-10-21 RX ORDER — SODIUM CHLORIDE, SODIUM LACTATE, POTASSIUM CHLORIDE, CALCIUM CHLORIDE 600; 310; 30; 20 MG/100ML; MG/100ML; MG/100ML; MG/100ML
25 INJECTION, SOLUTION INTRAVENOUS CONTINUOUS
Status: DISCONTINUED | OUTPATIENT
Start: 2019-10-21 | End: 2019-10-21 | Stop reason: HOSPADM

## 2019-10-21 RX ADMIN — PROPOFOL 40 MG: 10 INJECTION, EMULSION INTRAVENOUS at 08:04

## 2019-10-21 RX ADMIN — PROPOFOL 20 MG: 10 INJECTION, EMULSION INTRAVENOUS at 08:08

## 2019-10-21 RX ADMIN — SODIUM CHLORIDE, SODIUM LACTATE, POTASSIUM CHLORIDE, AND CALCIUM CHLORIDE 75 ML/HR: 600; 310; 30; 20 INJECTION, SOLUTION INTRAVENOUS at 07:19

## 2019-10-21 RX ADMIN — LIDOCAINE HYDROCHLORIDE 40 MG: 20 INJECTION, SOLUTION EPIDURAL; INFILTRATION; INTRACAUDAL; PERINEURAL at 08:04

## 2019-10-21 RX ADMIN — FAMOTIDINE 20 MG: 10 INJECTION INTRAVENOUS at 07:19

## 2019-10-21 NOTE — ANESTHESIA PREPROCEDURE EVALUATION
Relevant Problems   No relevant active problems       Anesthetic History   No history of anesthetic complications            Review of Systems / Medical History  Patient summary reviewed and pertinent labs reviewed    Pulmonary  Within defined limits                 Neuro/Psych   Within defined limits           Cardiovascular    Hypertension: well controlled              Exercise tolerance: <4 METS     GI/Hepatic/Renal     GERD           Endo/Other    Diabetes: well controlled, type 2  Hypothyroidism: well controlled  Arthritis     Other Findings              Physical Exam    Airway  Mallampati: III  TM Distance: 4 - 6 cm  Neck ROM: decreased range of motion   Mouth opening: Normal     Cardiovascular    Rhythm: regular  Rate: normal         Dental    Dentition: Poor dentition     Pulmonary  Breath sounds clear to auscultation               Abdominal         Other Findings            Anesthetic Plan    ASA: 2  Anesthesia type: MAC            Anesthetic plan and risks discussed with: Patient

## 2019-10-21 NOTE — H&P
[facility]   [email or web address]      Impression:   1.  dysphagia    Plan:     1. EGD and dilate    Chief Complaint:   dysphagia    HPI:  Harris Ragsdale is a 80 y.o. female who is being seen on consult for the above. Points to neck where bolus obstructs. No wt loss. See attached notes.     PMH:   Past Medical History:   Diagnosis Date    Diabetes ([address]     Gout     Hearing loss     Hypercholesterolemia     Hypertension     Nipple discharge     Dark blood intermitten    Thyroid disease     hypothyroidism       PSH:   Past Surgical History:   Procedure Laterality Date    HX CHOLECYSTECTOMY      HX COLONOSCOPY      HX TUMOR REMOVAL      side of abdomen       Social HX:   Social History     Socioeconomic History    Marital status:      Spouse name: Not on file    Number of children: Not on file    Years of education: Not on file    Highest education level: Not on file   Occupational History    Not on file   Social Needs    Financial resource strain: Not on file    Food insecurity:     Worry: Not on file     Inability: Not on file    Transportation needs:     Medical: Not on file     Non-medical: Not on file   Tobacco Use    Smoking status: Former Smoker    Smokeless tobacco: Former User     Quit date: 1987   Substance and Sexual Activity    Alcohol use: No    Drug use: Never    Sexual activity: Not on file   Lifestyle    Physical activity:     Days per week: Not on file     Minutes per session: Not on file    Stress: Not on file   Relationships    Social connections:     Talks on phone: Not on file     Gets together: Not on file     Attends Congregation service: Not on file     Active member of club or organization: Not on file     Attends meetings of clubs or organizations: Not on file     Relationship status: Not on file    Intimate partner violence:     Fear of current or ex partner: Not on file     Emotionally abused: Not on file     Physically abused: Not on file     Forced sexual activity: Not on file   Other Topics Concern    Not on file   Social History Narrative    Not on file       FHX:   History reviewed. No pertinent family history. Allergy:   Allergies   Allergen Reactions    Codeine Nausea and Vomiting    Erythromycin Other (comments)     Pt unable to remember reaction        Home Medications:     Medications Prior to Admission   Medication Sig    rOPINIRole (REQUIP) 0.5 mg tablet Take 0.5 mg by mouth daily.  glimepiride (AMARYL) 1 mg tablet Take 1 mg by mouth daily.  amLODIPine (NORVASC) 2.5 mg tablet TAKE 1 TABLET BY MOUTH EVERY MORNING    levothyroxine (SYNTHROID) 88 mcg tablet TAKE 1 TABLET BY MOUTH DAILY    allopurinol (ZYLOPRIM) 300 mg tablet Take 150 mg by mouth daily.  metoprolol tartrate (LOPRESSOR) 50 mg tablet Take 50 mg by mouth daily.  guaiFENesin (ROBITUSSIN) 100 mg/5 mL liquid Take 5 mL by mouth every four (4) hours as needed for Cough.  phenol throat spray (CHLORASEPTIC) 1.4 % spray Take 1 Spray by mouth as needed for Sore throat.  famotidine (PEPCID) 20 mg tablet Take 1 Tab by mouth two (2) times a day.  Lactobacillus Acidoph & Bulgar (FLORANEX) 1 million cell tab tablet Take 2 Tabs by mouth two (2) times a day.  vitamin E (AQUA GEMS) 400 unit capsule Take  by mouth daily.  lovastatin (MEVACOR) 40 mg tablet TAKE 1 TABLET BY MOUTH EVERY DAY    cyanocobalamin (VITAMIN B-12) 1,000 mcg tablet Take 1,000 mcg by mouth daily.  cholecalciferol (VITAMIN D3) 1,000 unit tablet Take 5,000 Units by mouth daily. Review of Systems:     Constitutional: No fevers, chills, weight loss, fatigue. Skin: No rashes, pruritis, jaundice, ulcerations, erythema. HENT: No headaches, nosebleeds, sinus pressure, rhinorrhea, sore throat. Eyes: No visual changes, blurred vision, eye pain, photophobia, jaundice. Cardiovascular: No chest pain, heart palpitations.    Respiratory: No cough, SOB, wheezing, chest discomfort, orthopnea. Gastrointestinal: dysphagia   Genitourinary: No dysuria, bleeding, discharge, pyuria. Musculoskeletal: No weakness, arthralgias, wasting. Endo: No sweats. Heme: No bruising, easy bleeding. Allergies: As noted. Neurological: Cranial nerves intact. Alert and oriented. Gait not assessed. Psychiatric:  No anxiety, depression, hallucinations. Visit Vitals  /71   Pulse 72   Temp 98.2 °F (36.8 °C)   Resp 18   Ht 5' 2\" (1.575 m)   Wt 59.5 kg (131 lb 4 oz)   SpO2 97%   Breastfeeding? No   BMI 24.01 kg/m²       Physical Assessment:     constitutional: appearance: well developed, well nourished, normal habitus, no deformities, in no acute distress. skin: inspection: no rashes, ulcers, icterus or other lesions; no clubbing or telangiectasias. palpation: no induration or subcutaneos nodules. eyes: inspection: normal conjunctivae and lids; no jaundice pupils: symmetrical, normoreactive to light, normal accommodation and size. ENMT: mouth: normal oral mucosa,lips and gums; good dentition. oropharynx: normal tongue, hard and soft palate; posterior pharynx without erithema, exudate or lesions. neck: thyroid: normal size, consistency and position; no masses or tenderness. respiratory: effort: normal chest excursion; no intercostal retraction or accessory muscle use. cardiovascular: abdominal aorta: normal size and position; no bruits. palpation: PMI of normal size and position; normal rhythm; no thrill or murmurs. abdominal: abdomen: normal consistency; no tenderness or masses. hernias: no hernias appreciated. liver: normal size and consistency. spleen: not palpable. rectal: hemoccult/guaiac: not performed. musculoskeletal: digits and nails: no clubbing, cyanosis, petechiae or other inflammatory conditions. gait: normal gait and station head and neck: normal range of motion; no pain, crepitation or contracture.  spine/ribs/pelvis: normal range of motion; no pain, deformity or contracture. lymphatic: axilae: not palpable. groin: not palpable. neck: within normal limits. other: not palpable. neurologic: cranial nerves: II-XII normal.   psychiatric: judgement/insight: within normal limits. memory: within normal limits for recent and remote events. mood and affect: no evidence of depression, anxiety or agitation. orientation: oriented to time, space and person. Basic Metabolic Profile   No results for input(s): NA, K, CL, CO2, BUN, GLU, CA, MG, PHOS in the last 72 hours. No lab exists for component: CREAT      CBC w/Diff    No results for input(s): WBC, RBC, HGB, HCT, MCV, MCH, MCHC, RDW, PLT, HGBEXT, HCTEXT, PLTEXT in the last 72 hours. No lab exists for component: MPV No results for input(s): GRANS, LYMPH, EOS, PRO, MYELO, METAS, BLAST in the last 72 hours. No lab exists for component: MONO, BASO     Hepatic Function   No results for input(s): ALB, TP, TBILI, GPT, SGOT, AP, AML, LPSE in the last 72 hours. No lab exists for component: Laura Zamudio MD, M.D. [facility], [address] [place] [address]  [email or web address]

## 2019-10-21 NOTE — DISCHARGE INSTRUCTIONS
Learning About Schatzki's Ring  What is Schatzki's ring? A Schatzki's ring is a ring of tissue that forms inside the esophagus, the tube that carries food and liquid to your stomach. This ring makes the esophagus narrow in one area, close to where it meets the stomach. It can make it hard to swallow. You may feel like food gets stuck in your esophagus. Doctors aren't sure exactly what causes these rings. The ring is also something you can be born with. How is Schatzki's ring diagnosed? Your doctor may check your esophagus if you are having trouble swallowing or if you feel like food is getting stuck. The doctor will use a tool called an endoscope, or scope. It's a thin, flexible, lighted viewing tool. It goes into the mouth and down the throat. Your doctor can use it to check for any problems. The scope can also be used to take a sample of tissue to test (biopsy). You might need an X-ray. For the X-ray, you may need to swallow a substance, such as barium, that makes it easier to see what happens in your esophagus. How is Schatzki's ring treated? A Schatzki's ring is usually treated with a procedure called esophageal dilation. Dilation can open up narrow areas of the esophagus. Before the procedure, you will get medicines through a needle in your vein (IV) in your arm or hand. These medicines reduce pain and will make you feel relaxed and drowsy. Your throat will also be numbed. You may not remember much about the treatment. The doctor will guide a balloon or a plastic tool for widening (dilator) down your throat and into your esophagus. The dilator is used to widen any narrow area. To guide the dilator, the doctor may use a scope. Or he or she may use a thin wire as a guide. After the procedure, you will be observed for 1 to 2 hours until the medicines wear off. If your throat was numbed before the test, you should not eat or drink until your throat is no longer numb.  When you are fully recovered, you can go home. You will not be able to drive or operate machinery for 12 hours after the test. Your doctor will tell you when you can go back to your usual diet and activities. Do not drink alcohol for 12 to 24 hours after the test.  You may still need to treat some symptoms of GERD. Your doctor may give you information about that. Follow-up care is a key part of your treatment and safety. Be sure to make and go to all appointments, and call your doctor if you are having problems. It's also a good idea to know your test results and keep a list of the medicines you take. Where can you learn more? Go to http://ronal-josefina.info/. Enter [id number] in the search box to learn more about \"Learning About Schatzki's Ring. \"  Current as of: November 7, 2018  Content Version: 12.2  © 5016-5064 [employer]. Care instructions adapted under license by [employer] (which disclaims liability or warranty for this information). If you have questions about a medical condition or this instruction, always ask your healthcare professional. [address] any warranty or liability for your use of this information. Gastritis: Care Instructions  Your Care Instructions    Gastritis is a sore and upset stomach. It happens when something irritates the stomach lining. Many things can cause it. These include an infection such as the flu or something you ate or drank. Medicines or a sore on the lining of the stomach (ulcer) also can cause it. Your belly may bloat and ache. You may belch, vomit, and feel sick to your stomach. You should be able to relieve the problem by taking medicine. And it may help to change your diet. If gastritis lasts, your doctor may prescribe medicine. Follow-up care is a key part of your treatment and safety. Be sure to make and go to all appointments, and call your doctor if you are having problems.  It's also a good idea to know your test results and keep a list of the medicines you take. How can you care for yourself at home? · If your doctor prescribed antibiotics, take them as directed. Do not stop taking them just because you feel better. You need to take the full course of antibiotics. · Be safe with medicines. If your doctor prescribed medicine to decrease stomach acid, take it as directed. Call your doctor if you think you are having a problem with your medicine. · Do not take any other medicine, including over-the-counter pain relievers, without talking to your doctor first.  · If your doctor recommends over-the-counter medicine to reduce stomach acid, such as Pepcid AC, Prilosec, Tagamet HB, or Zantac 75, follow the directions on the label. · Drink plenty of fluids (enough so that your urine is light yellow or clear like water) to prevent dehydration. Choose water and other caffeine-free clear liquids. If you have kidney, heart, or liver disease and have to limit fluids, talk with your doctor before you increase the amount of fluids you drink. · Limit how much alcohol you drink. · Avoid coffee, tea, cola drinks, chocolate, and other foods with caffeine. They increase stomach acid. When should you call for help? Call 911 anytime you think you may need emergency care. For example, call if:    · You vomit blood or what looks like coffee grounds.     · You pass maroon or very bloody stools.    Call your doctor now or seek immediate medical care if:    · You start breathing fast and have not produced urine in the last 8 hours.     · You cannot keep fluids down.    Watch closely for changes in your health, and be sure to contact your doctor if:    · You do not get better as expected. Where can you learn more? Go to http://ronal-josefina.info/. Enter [id number] in the search box to learn more about \"Gastritis: Care Instructions. \"  Current as of: November 7, 2018  Content Version: 12.2  © 7842-2765 [employer]. Care instructions adapted under license by [employer]tag (which disclaims liability or warranty for this information). If you have questions about a medical condition or this instruction, always ask your healthcare professional. [address] any warranty or liability for your use of this information. Esophageal Dilation: What to Expect at [address] [place] [address]  After you have esophageal dilation, you will stay at the hospital or surgery center for 1 to 2 hours. This will allow the medicine to wear off. You will be able to go home after your doctor or nurse checks to make sure you are not having any problems. This care sheet gives you a general idea about how long it will take for you to recover. But each person recovers at a different pace. Follow the steps below to get better as quickly as possible. How can you care for yourself at home? Activity    · Rest as much as you need to after you go home.     · You should be able to go back to your usual activities the day after the procedure. Diet    · Follow your doctor's directions for eating after the procedure.     · Drink plenty of fluids (unless your doctor has told you not to). Medicines    · Your doctor will tell you if and when you can restart your medicines. He or she will also give you instructions about taking any new medicines.     · If you take blood thinners, such as warfarin (Coumadin), clopidogrel (Plavix), or aspirin, be sure to talk to your doctor. He or she will tell you if and when to start taking those medicines again. Make sure that you understand exactly what your doctor wants you to do.     · If you have a sore throat the day after the procedure, use an over-the-counter spray to numb your throat. Sucking on throat lozenges and gargling with warm salt water may also help relieve your symptoms. Follow-up care is a key part of your treatment and safety.  Be sure to make and go to all appointments, and call your doctor if you are having problems. It's also a good idea to know your test results and keep a list of the medicines you take. When should you call for help? Call 911 anytime you think you may need emergency care. For example, call if:    · You passed out (lost consciousness).     · You have trouble breathing.     · Your stools are maroon or very bloody    Call your doctor now or seek immediate medical care if:    · You have new or worse belly pain.     · You have a fever.     · You have new or more blood in your stools.     · You are sick to your stomach and cannot drink fluids.     · You cannot pass stools or gas.     · You have pain that does not get better after you take pain medicine.    Watch closely for changes in your health, and be sure to contact your doctor if:    · Your throat still hurts after a day or two.     · You do not get better as expected. Where can you learn more? Go to http://ronal-josefina.info/. Enter H320 in the search box to learn more about \"Esophageal Dilation: What to Expect at Home. \"  Current as of: November 7, 2018  Content Version: 12.2  © 2438-1265 [employer]. Care instructions adapted under license by [employer] (which disclaims liability or warranty for this information). If you have questions about a medical condition or this instruction, always ask your healthcare professional. [address] any warranty or liability for your use of this information. Hiatal Hernia: Care Instructions  Your Care Instructions  A hiatal hernia occurs when part of the stomach bulges into the chest cavity. A hiatal hernia may allow stomach acid and juices to back up into the esophagus (acid reflux). This can cause a feeling of burning, warmth, heat, or pain behind the breastbone. This feeling may often occur after you eat, soon after you lie down, or when you bend forward, and it may come and go.  You also may have a sour taste in your mouth. These symptoms are commonly known as heartburn or reflux. But not all hiatal hernias cause symptoms. Follow-up care is a key part of your treatment and safety. Be sure to make and go to all appointments, and call your doctor if you are having problems. It's also a good idea to know your test results and keep a list of the medicines you take. How can you care for yourself at home? · Take your medicines exactly as prescribed. Call your doctor if you think you are having a problem with your medicine. · Do not take aspirin or other nonsteroidal anti-inflammatory drugs (NSAIDs), such as ibuprofen (Advil, Motrin) or naproxen (Aleve), unless your doctor says it is okay. Ask your doctor what you can take for pain. · Your doctor may recommend over-the-counter medicine. For mild or occasional indigestion, antacids such as Tums, Gaviscon, Maalox, or Mylanta may help. Your doctor also may recommend over-the-counter acid reducers, such as famotidine (Pepcid AC), cimetidine (Tagamet HB), ranitidine (Zantac 75 and Zantac 150), or omeprazole (Prilosec). Read and follow all instructions on the label. If you use these medicines often, talk with your doctor. · Change your eating habits. ? It's best to eat several small meals instead of two or three large meals. ? After you eat, wait 2 to 3 hours before you lie down. Late-night snacks aren't a good idea. ? Chocolate, mint, and alcohol can make heartburn worse. They relax the valve between the esophagus and the stomach. ? Spicy foods, foods that have a lot of acid (like tomatoes and oranges), and coffee can make heartburn symptoms worse in some people. If your symptoms are worse after you eat a certain food, you may want to stop eating that food to see if your symptoms get better.   · Do not smoke or chew tobacco.  · If you get heartburn at night, raise the head of your bed 6 to 8 inches by putting the frame on blocks or placing a foam wedge under the head of your mattress. (Adding extra pillows does not work.)  · Do not wear tight clothing around your middle. · Lose weight if you need to. Losing just 5 to 10 pounds can help. When should you call for help? Call your doctor now or seek immediate medical care if:    · You have new or worse belly pain.     · You are vomiting.    Watch closely for changes in your health, and be sure to contact your doctor if:    · You have new or worse symptoms of indigestion.     · You have trouble or pain swallowing.     · You are losing weight.     · You do not get better as expected. Where can you learn more? Go to http://ronal-josefina.info/. Enter F695 in the search box to learn more about \"Hiatal Hernia: Care Instructions. \"  Current as of: November 7, 2018  Content Version: 12.2  © 2503-0509 [employer] 1699. Care instructions adapted under license by [employer] (which disclaims liability or warranty for this information). If you have questions about a medical condition or this instruction, always ask your healthcare professional. [place] [address] any warranty or liability for your use of this information. Upper GI Endoscopy: What to Expect at [address] [place] [address]  After you have an endoscopy, you will stay at the hospital or clinic for 1 to 2 hours. This will allow the medicine to wear off. You will be able to go home after your doctor or nurse checks to make sure you are not having any problems. You may have to stay overnight if you had treatment during the test. You may have a sore throat for a day or two after the test.  This care sheet gives you a general idea about what to expect after the test.  How can you care for yourself at home? Activity  · Rest as much as you need to after you go home.   · You should be able to go back to your usual activities the day after the test.  Diet  · Follow your doctor's directions for eating after the test.  · Drink plenty of fluids (unless your doctor has told you not to). Medications  · If you have a sore throat the day after the test, use an over-the-counter spray to numb your throat. Follow-up care is a key part of your treatment and safety. Be sure to make and go to all appointments, and call your doctor if you are having problems. It's also a good idea to know your test results and keep a list of the medicines you take. When should you call for help? Call 911 anytime you think you may need emergency care. For example, call if:    · You passed out (loses consciousness).     · You have trouble breathing.     · You pass maroon or bloody stools.    Call your doctor now or seek immediate medical care if:    · You have pain that does not get better after your take pain medicine.     · You have new or worse belly pain.     · You have blood in your stools.     · You are sick to your stomach and cannot keep fluids down.     · You have a fever.     · You cannot pass stools or gas.    Watch closely for changes in your health, and be sure to contact your doctor if:    · Your throat still hurts after a day or two.     · You do not get better as expected. Where can you learn more? Go to http://ronal-josefina.info/. Enter [phone] in the search box to learn more about \"Upper GI Endoscopy: What to Expect at Home. \"  Current as of: November 7, 2018  Content Version: 12.2  © 3549-4951 [employer]. Care instructions adapted under license by [employer] (which disclaims liability or warranty for this information). If you have questions about a medical condition or this instruction, always ask your healthcare professional. [place] [address] any warranty or liability for your use of this information.       DISCHARGE SUMMARY from Nurse    PATIENT INSTRUCTIONS:    After general anesthesia or intravenous sedation, for 24 hours or while taking prescription Narcotics:  · Limit your activities  · Do not drive and operate hazardous machinery  · Do not make important personal or business decisions  · Do  not drink alcoholic beverages  · If you have not urinated within 8 hours after discharge, please contact your surgeon on call. Report the following to your surgeon:  · Excessive pain, swelling, redness or odor of or around the surgical area  · Temperature over 100.5  · Nausea and vomiting lasting longer than 4 hours or if unable to take medications  · Any signs of decreased circulation or nerve impairment to extremity: change in color, persistent  numbness, tingling, coldness or increase pain  · Any questions    What to do at Home:  Recommended activity: Activity as tolerated and no driving for today. *  Please give a list of your current medications to your Primary Care Provider. *  Please update this list whenever your medications are discontinued, doses are      changed, or new medications (including over-the-counter products) are added. *  Please carry medication information at all times in case of emergency situations. These are general instructions for a healthy lifestyle:    No smoking/ No tobacco products/ Avoid exposure to second hand smoke  Surgeon General's Warning:  Quitting smoking now greatly reduces serious risk to your health. Obesity, smoking, and sedentary lifestyle greatly increases your risk for illness    A healthy diet, regular physical exercise & weight monitoring are important for maintaining a healthy lifestyle    You may be retaining fluid if you have a history of heart failure or if you experience any of the following symptoms:  Weight gain of 3 pounds or more overnight or 5 pounds in a week, increased swelling in our hands or feet or shortness of breath while lying flat in bed. Please call your doctor as soon as you notice any of these symptoms; do not wait until your next office visit.         The discharge information has been reviewed with the patient. The patient verbalized understanding. Discharge medications reviewed with the patient and appropriate educational materials and side effects teaching were provided.   ___________________________________________________________________________________________________________________________________

## 2019-10-21 NOTE — ANESTHESIA POSTPROCEDURE EVALUATION
Procedure(s):  UPPER ENDOSCOPY WITH DILATION, biopsies.     MAC    Anesthesia Post Evaluation      Multimodal analgesia: multimodal analgesia used between 6 hours prior to anesthesia start to PACU discharge  Patient location during evaluation: bedside  Patient participation: complete - patient participated  Level of consciousness: awake  Pain management: adequate  Airway patency: patent  Anesthetic complications: no  Cardiovascular status: stable  Respiratory status: acceptable  Hydration status: acceptable  Post anesthesia nausea and vomiting:  controlled      Vitals Value Taken Time   /53 10/21/2019  8:37 AM   Temp 36.5 °C (97.7 °F) 10/21/2019  8:23 AM   Pulse 62 10/21/2019  8:41 AM   Resp 18 10/21/2019  8:41 AM   SpO2 97 % 10/21/2019  8:41 AM

## 2020-09-01 ENCOUNTER — HOSPITAL ENCOUNTER (OUTPATIENT)
Dept: GENERAL RADIOLOGY | Age: 85
Discharge: HOME | End: 2020-09-01
Payer: MEDICARE

## 2020-09-01 DIAGNOSIS — M54.9 BACK PAIN, CHRONIC: ICD-10-CM

## 2020-09-01 DIAGNOSIS — G89.29 BACK PAIN, CHRONIC: ICD-10-CM

## 2020-09-01 PROCEDURE — 72040 X-RAY EXAM NECK SPINE 2-3 VW: CPT

## 2020-09-01 PROCEDURE — 71100 X-RAY EXAM RIBS UNI 2 VIEWS: CPT

## 2020-09-01 PROCEDURE — 72070 X-RAY EXAM THORAC SPINE 2VWS: CPT

## 2021-12-23 NOTE — PROGRESS NOTES
Problem: Falls - Risk of 
Goal: *Absence of Falls Document Levi Kappa Fall Risk and appropriate interventions in the flowsheet. Outcome: Progressing Towards Goal 
Fall Risk Interventions: 
Mobility Interventions: Communicate number of staff needed for ambulation/transfer, Patient to call before getting OOB Medication Interventions: Patient to call before getting OOB, Evaluate medications/consider consulting pharmacy, Teach patient to arise slowly Elimination Interventions: Call light in reach, Patient to call for help with toileting needs, Toilet paper/wipes in reach History of Falls Interventions: Door open when patient unattended, Evaluate medications/consider consulting pharmacy Paramedian Forehead Flap Text: A decision was made to reconstruct the defect utilizing an interpolation axial flap and a staged reconstruction.  A telfa template was made of the defect.  This telfa template was then used to outline the paramedian forehead pedicle flap.  The donor area for the pedicle flap was then injected with anesthesia.  The flap was excised through the skin and subcutaneous tissue down to the layer of the underlying musculature.  The pedicle flap was carefully excised within this deep plane to maintain its blood supply.  The edges of the donor site were undermined.   The donor site was closed in a primary fashion.  The pedicle was then rotated into position and sutured.  Once the tube was sutured into place, adequate blood supply was confirmed with blanching and refill.  The pedicle was then wrapped with xeroform gauze and dressed appropriately with a telfa and gauze bandage to ensure continued blood supply and protect the attached pedicle.

## 2022-03-19 PROBLEM — R06.02 SOB (SHORTNESS OF BREATH): Status: ACTIVE | Noted: 2018-10-05

## 2022-03-19 PROBLEM — J18.9 COMMUNITY ACQUIRED PNEUMONIA: Status: ACTIVE | Noted: 2018-10-05

## 2022-03-19 PROBLEM — J20.9 ACUTE BRONCHITIS: Status: ACTIVE | Noted: 2018-10-05

## 2022-06-14 NOTE — NURSE NAVIGATOR
Important Message from [id number]" reviewed and explained with the patient and/or representative at bedside and signature was obtained. A signed copy provided to patient/representative. Original signed document placed in patient's chart. [Normal] : no acute distress, well nourished, well developed and well-appearing [de-identified] : dry flaky scalp    left side of nose thickened growth

## 2023-01-26 ENCOUNTER — TRANSCRIBE ORDER (OUTPATIENT)
Dept: SCHEDULING | Age: 88
End: 2023-01-26

## 2023-01-26 DIAGNOSIS — I73.9 CLAUDICATION (HCC): Primary | ICD-10-CM

## 2023-01-26 DIAGNOSIS — E11.9 DIABETES (HCC): ICD-10-CM

## 2023-01-26 DIAGNOSIS — I10 HTN (HYPERTENSION): ICD-10-CM

## 2023-01-26 DIAGNOSIS — M79.606 LEG PAIN: ICD-10-CM

## 2023-02-01 ENCOUNTER — HOSPITAL ENCOUNTER (OUTPATIENT)
Dept: VASCULAR SURGERY | Age: 88
Discharge: HOME | End: 2023-02-01
Attending: INTERNAL MEDICINE
Payer: MEDICARE

## 2023-02-01 DIAGNOSIS — I73.9 CLAUDICATION (HCC): ICD-10-CM

## 2023-02-01 DIAGNOSIS — E11.9 DIABETES (HCC): ICD-10-CM

## 2023-02-01 DIAGNOSIS — M79.606 LEG PAIN: ICD-10-CM

## 2023-02-01 DIAGNOSIS — I10 HTN (HYPERTENSION): ICD-10-CM

## 2023-02-01 LAB
LEFT ABI: 0.76
LEFT ARM BP: 130 MMHG
LEFT POSTERIOR TIBIAL: 102 MMHG
LEFT TBI: 0.62
LEFT TOE PRESSURE: 86 MMHG
RIGHT ABI: 0.86
RIGHT ARM BP: 138 MMHG
RIGHT POSTERIOR TIBIAL: 117 MMHG
RIGHT TBI: 0.73
RIGHT TOE PRESSURE: 101 MMHG
VAS LEFT DORSALIS PEDIS BP: 105 MMHG
VAS RIGHT DORSALIS PEDIS BP: 119 MMHG

## 2023-02-01 PROCEDURE — 93922 UPR/L XTREMITY ART 2 LEVELS: CPT

## 2023-02-01 PROCEDURE — 93925 LOWER EXTREMITY STUDY: CPT

## 2023-03-22 ENCOUNTER — OFFICE VISIT (OUTPATIENT)
Age: 88
End: 2023-03-22
Payer: MEDICARE

## 2023-03-22 VITALS
DIASTOLIC BLOOD PRESSURE: 82 MMHG | SYSTOLIC BLOOD PRESSURE: 134 MMHG | BODY MASS INDEX: 23.55 KG/M2 | HEART RATE: 66 BPM | HEIGHT: 62 IN | WEIGHT: 128 LBS | OXYGEN SATURATION: 99 %

## 2023-03-22 DIAGNOSIS — I73.9 PAD (PERIPHERAL ARTERY DISEASE) (HCC): Primary | ICD-10-CM

## 2023-03-22 PROCEDURE — 99203 OFFICE O/P NEW LOW 30 MIN: CPT | Performed by: PHYSICIAN ASSISTANT

## 2023-03-22 PROCEDURE — 1123F ACP DISCUSS/DSCN MKR DOCD: CPT | Performed by: PHYSICIAN ASSISTANT

## 2023-03-22 ASSESSMENT — ENCOUNTER SYMPTOMS
CHEST TIGHTNESS: 0
SHORTNESS OF BREATH: 0
DIARRHEA: 0
NAUSEA: 0
BACK PAIN: 1
VOMITING: 0

## 2023-03-22 ASSESSMENT — PATIENT HEALTH QUESTIONNAIRE - PHQ9
SUM OF ALL RESPONSES TO PHQ9 QUESTIONS 1 & 2: 0
SUM OF ALL RESPONSES TO PHQ QUESTIONS 1-9: 0
1. LITTLE INTEREST OR PLEASURE IN DOING THINGS: 0
2. FEELING DOWN, DEPRESSED OR HOPELESS: 0
SUM OF ALL RESPONSES TO PHQ QUESTIONS 1-9: 0

## 2023-03-22 NOTE — PROGRESS NOTES
Chief Complaint   Patient presents with    New Patient    Leg Pain         HPI: Fred Pedraza is a 80 y.o. female with a PMHx of gout, HTN and HLD who presents with right leg thigh pain x 1 year. Per patient she develops back/thigh pain when she gets out of bed in the middle of the night to go to the bathroom or when getting up in the morning. She states she has had some back pain off and on for the past couple of years. She is completely IADL and drove to the appointment today. She describes more neurologic pain stating when she goes shopping if she uses a cart she does not have any back or right leg pain vs if she does not have cart. Denies any calf pain more thigh pain. Denies any rest pain or nonhealing wounds. IMAGING:    I have personally reviewed this PVL study - 2/01/2023. Interpretation Summary    This is a summary report. The complete report is available in the patient's medical record. If you cannot access the medical record, please contact the sending organization for a detailed fax or copy. · Mild to moderate arterial insufficiency demonstrated in the bilateral lower extremities at rest.  · Triphasic waveforms noted in the posterior tibial and dorsalis pedis arteries bilaterally. · Toe PPGs normal, with toe-brachial indexes within normal limits.      Arterial Pressure Measurements     Right Left   Brachial  mmHg        130 mmHg          Post Tibial  mmHg        102 mmHg          Dorslis Pedis  mmHg        105 mmHg          ANGELINE 0.86        0.76          Toe Pressure 101 mmHg        86 mmHg          TBI 0.73        0.62              Past Medical History:   Diagnosis Date    Diabetes ([address]     Gout     Hearing loss     Hypercholesterolemia     Hypertension     Nipple discharge     Dark blood intermitten    Thyroid disease     hypothyroidism       Past Surgical History:   Procedure Laterality Date    CHOLECYSTECTOMY      COLONOSCOPY      TUMOR REMOVAL      side of abdomen

## 2023-03-22 NOTE — PROGRESS NOTES
1. Have you been to the ER, urgent care clinic since your last visit? No     Hospitalized since your last visit? No     2. Have you seen or consulted any other health care providers outside of the [address] [place] [address] since your last visit? Include any pap smears or colon screening.   No

## 2023-07-13 ENCOUNTER — OFFICE VISIT (OUTPATIENT)
Age: 88
End: 2023-07-13

## 2023-07-13 VITALS
OXYGEN SATURATION: 96 % | TEMPERATURE: 97.9 F | RESPIRATION RATE: 16 BRPM | BODY MASS INDEX: 23.81 KG/M2 | HEIGHT: 62 IN | DIASTOLIC BLOOD PRESSURE: 63 MMHG | SYSTOLIC BLOOD PRESSURE: 136 MMHG | WEIGHT: 129.4 LBS | HEART RATE: 65 BPM

## 2023-07-13 DIAGNOSIS — M43.16 SPONDYLOLISTHESIS, LUMBAR REGION: ICD-10-CM

## 2023-07-13 DIAGNOSIS — M47.817 LUMBOSACRAL SPONDYLOSIS WITHOUT MYELOPATHY: ICD-10-CM

## 2023-07-13 DIAGNOSIS — M54.50 LUMBAR PAIN: Primary | ICD-10-CM

## 2023-07-13 DIAGNOSIS — N18.32 STAGE 3B CHRONIC KIDNEY DISEASE (HCC): ICD-10-CM

## 2023-07-13 DIAGNOSIS — M51.36 DDD (DEGENERATIVE DISC DISEASE), LUMBAR: ICD-10-CM

## 2023-07-13 DIAGNOSIS — M54.16 LUMBAR NEURITIS: ICD-10-CM

## 2023-07-13 RX ORDER — AMLODIPINE BESYLATE 10 MG/1
10 TABLET ORAL DAILY
COMMUNITY
Start: 2023-06-19

## 2023-07-13 RX ORDER — MELOXICAM 7.5 MG/1
TABLET ORAL
COMMUNITY
Start: 2023-07-06

## 2023-07-13 RX ORDER — LEVOTHYROXINE SODIUM 0.07 MG/1
75 TABLET ORAL DAILY
COMMUNITY
Start: 2023-05-18

## 2023-07-13 RX ORDER — ERGOCALCIFEROL 1.25 MG/1
50000 CAPSULE ORAL
COMMUNITY
Start: 2023-04-27

## 2023-07-13 RX ORDER — METHYLPREDNISOLONE 4 MG/1
TABLET ORAL
Qty: 1 KIT | Refills: 0 | Status: SHIPPED | OUTPATIENT
Start: 2023-07-13

## 2023-07-28 ENCOUNTER — HOSPITAL ENCOUNTER (OUTPATIENT)
Facility: HOSPITAL | Age: 88
End: 2023-07-28
Attending: PHYSICAL MEDICINE & REHABILITATION
Payer: MEDICARE

## 2023-07-28 DIAGNOSIS — M43.16 SPONDYLOLISTHESIS, LUMBAR REGION: ICD-10-CM

## 2023-07-28 DIAGNOSIS — M54.50 LUMBAR PAIN: ICD-10-CM

## 2023-07-28 DIAGNOSIS — M54.16 LUMBAR NEURITIS: ICD-10-CM

## 2023-07-28 DIAGNOSIS — M47.817 LUMBOSACRAL SPONDYLOSIS WITHOUT MYELOPATHY: ICD-10-CM

## 2023-07-28 DIAGNOSIS — M51.36 DDD (DEGENERATIVE DISC DISEASE), LUMBAR: ICD-10-CM

## 2023-07-28 PROCEDURE — 72148 MRI LUMBAR SPINE W/O DYE: CPT

## 2023-08-31 ENCOUNTER — OFFICE VISIT (OUTPATIENT)
Age: 88
End: 2023-08-31
Payer: MEDICARE

## 2023-08-31 VITALS
WEIGHT: 127 LBS | BODY MASS INDEX: 23.37 KG/M2 | HEIGHT: 62 IN | OXYGEN SATURATION: 96 % | HEART RATE: 67 BPM | TEMPERATURE: 96.8 F

## 2023-08-31 DIAGNOSIS — M51.36 DDD (DEGENERATIVE DISC DISEASE), LUMBAR: ICD-10-CM

## 2023-08-31 DIAGNOSIS — M47.817 LUMBOSACRAL SPONDYLOSIS WITHOUT MYELOPATHY: ICD-10-CM

## 2023-08-31 DIAGNOSIS — M48.062 SPINAL STENOSIS OF LUMBAR REGION WITH NEUROGENIC CLAUDICATION: ICD-10-CM

## 2023-08-31 DIAGNOSIS — M47.816 FACET ARTHROPATHY, LUMBAR: ICD-10-CM

## 2023-08-31 DIAGNOSIS — M54.16 LUMBAR NEURITIS: ICD-10-CM

## 2023-08-31 DIAGNOSIS — M48.07 FORAMINAL STENOSIS OF LUMBOSACRAL REGION: Primary | ICD-10-CM

## 2023-08-31 PROCEDURE — 99214 OFFICE O/P EST MOD 30 MIN: CPT | Performed by: PHYSICAL MEDICINE & REHABILITATION

## 2023-08-31 PROCEDURE — 1123F ACP DISCUSS/DSCN MKR DOCD: CPT | Performed by: PHYSICAL MEDICINE & REHABILITATION

## 2023-08-31 RX ORDER — METOPROLOL SUCCINATE 50 MG/1
TABLET, EXTENDED RELEASE ORAL
COMMUNITY
Start: 2023-07-27

## 2023-08-31 NOTE — PROGRESS NOTES
[facility]  [address] [employer]   [address]  [place], [place]  Tel: [phone]  Fax: [phone]          PROGRESS NOTE      HISTORY OF PRESENT ILLNESS:  The patient is a 80 y.o. female and was seen today for follow up of low back pain radiating to the RLE in a circumferential distribution. Patient says she also has LLE symptoms but they are intermittent and not as bad as the RLE. Patient says her RLE symptoms are worse than her low back pain. Patient notes RLE symptoms started around a year ago (July 2022) and low back pain started at the end of April 2023. Patient notes her pain is progressive in nature. Her pain is exacerbated by walking, relieved with sitting. Positive shopping cart sign. Pt denies change in bowel or bladder habits. Patient denies recent fevers, weight loss, rashes or skin sores. No stomach ulcers or bleeding disorders. No history of spinal surgery or injections. Patient denies recent physical therapy or chiropractic care. Patient takes Mobic w/o relief. The patient is R/LHD. PmHx of Stage 3 renal disease (GFR of 33 on 5/25/2023), HTN, DM (A1C of 6.1 on 5/25/2023. Note from Dr. Vee Levy dated 6/1/2023 indicating patient was seen with c/o left sided lower back pain, right thigh and RLE pain. worse with walking. symptoms noted since the office on 1/26/2023. Patient was evaluated by a vascular specialist and reported, \"circulation problems and leg pain is coming from back. \" Patient's pain is located to the right anterior thigh, pain is aching and throbbing. Lumbar spine plain films dated 7/13/2023. 2 views: AP and Lateral. revealed: Grade 1 anterolisthesis L4 on L5 and Grade 1 anterolisthesis L5 on S1. Disc space collapse L5-S1. At her last clinical appointment, I discussed how her neuropathic medication options were limited due to her renal function. Patient was not interested in surgery  at this time. Patient was interested in injection.  I ordered an L

## 2023-08-31 NOTE — PROGRESS NOTES
Harinder Gomez presents today for   Chief Complaint   Patient presents with    Back Pain    Leg Pain     Right        Is someone accompanying this pt? Yes, male     Is the patient using any DME equipment during OV? no    Depression Screening:  PHQ-9 Questionaire 3/22/2023   Little interest or pleasure in doing things 0   Feeling down, depressed, or hopeless 0   PHQ-9 Total Score 0     PHQ Scores 3/22/2023   PHQ2 Score 0   PHQ9 Score 0         Coordination of Care:  1. Have you been to the ER, urgent care clinic since your last visit? no  Hospitalized since your last visit? no    2. Have you seen or consulted any other health care providers outside of the [address] [place] [address] since your last visit? no Include any pap smears or colon screening.  no

## 2023-09-14 ENCOUNTER — HOSPITAL ENCOUNTER (OUTPATIENT)
Facility: HOSPITAL | Age: 88
End: 2023-09-14
Payer: MEDICARE

## 2023-09-14 VITALS
RESPIRATION RATE: 16 BRPM | DIASTOLIC BLOOD PRESSURE: 78 MMHG | SYSTOLIC BLOOD PRESSURE: 181 MMHG | TEMPERATURE: 97.7 F | OXYGEN SATURATION: 95 % | HEART RATE: 78 BPM

## 2023-09-14 PROBLEM — M48.062 SPINAL STENOSIS, LUMBAR REGION, WITH NEUROGENIC CLAUDICATION: Status: ACTIVE | Noted: 2023-09-14

## 2023-09-14 LAB — GLUCOSE BLD STRIP.AUTO-MCNC: 129 MG/DL (ref 70–110)

## 2023-09-14 PROCEDURE — 64484 NJX AA&/STRD TFRM EPI L/S EA: CPT | Performed by: PHYSICAL MEDICINE & REHABILITATION

## 2023-09-14 PROCEDURE — 64483 NJX AA&/STRD TFRM EPI L/S 1: CPT

## 2023-09-14 PROCEDURE — 64484 NJX AA&/STRD TFRM EPI L/S EA: CPT

## 2023-09-14 PROCEDURE — 6370000000 HC RX 637 (ALT 250 FOR IP): Performed by: PHYSICAL MEDICINE & REHABILITATION

## 2023-09-14 PROCEDURE — 64483 NJX AA&/STRD TFRM EPI L/S 1: CPT | Performed by: PHYSICAL MEDICINE & REHABILITATION

## 2023-09-14 PROCEDURE — 6360000002 HC RX W HCPCS: Performed by: PHYSICAL MEDICINE & REHABILITATION

## 2023-09-14 PROCEDURE — 2500000003 HC RX 250 WO HCPCS: Performed by: PHYSICAL MEDICINE & REHABILITATION

## 2023-09-14 PROCEDURE — 82962 GLUCOSE BLOOD TEST: CPT

## 2023-09-14 RX ORDER — DIAZEPAM 5 MG/1
5 TABLET ORAL ONCE
Status: COMPLETED | OUTPATIENT
Start: 2023-09-14 | End: 2023-09-14

## 2023-09-14 RX ORDER — DIAZEPAM 5 MG/1
10 TABLET ORAL ONCE
Status: COMPLETED | OUTPATIENT
Start: 2023-09-14 | End: 2023-09-14

## 2023-09-14 RX ORDER — LIDOCAINE HYDROCHLORIDE 10 MG/ML
30 INJECTION, SOLUTION EPIDURAL; INFILTRATION; INTRACAUDAL; PERINEURAL ONCE
Status: COMPLETED | OUTPATIENT
Start: 2023-09-14 | End: 2023-09-14

## 2023-09-14 RX ORDER — DIAZEPAM 5 MG/1
2.5 TABLET ORAL ONCE
Status: COMPLETED | OUTPATIENT
Start: 2023-09-14 | End: 2023-09-14

## 2023-09-14 RX ORDER — IOPAMIDOL 408 MG/ML
4 INJECTION, SOLUTION INTRATHECAL
Status: DISCONTINUED | OUTPATIENT
Start: 2023-09-14 | End: 2023-09-18 | Stop reason: HOSPADM

## 2023-09-14 RX ORDER — DEXAMETHASONE SODIUM PHOSPHATE 10 MG/ML
10 INJECTION, SOLUTION INTRAMUSCULAR; INTRAVENOUS ONCE
Status: COMPLETED | OUTPATIENT
Start: 2023-09-14 | End: 2023-09-14

## 2023-09-14 RX ADMIN — DIAZEPAM 2.5 MG: 5 TABLET ORAL at 14:09

## 2023-09-14 RX ADMIN — LIDOCAINE HYDROCHLORIDE 30 ML: 10 INJECTION, SOLUTION EPIDURAL; INFILTRATION; INTRACAUDAL; PERINEURAL at 14:45

## 2023-09-14 RX ADMIN — DEXAMETHASONE SODIUM PHOSPHATE 10 MG: 10 INJECTION, SOLUTION INTRAMUSCULAR; INTRAVENOUS at 14:50

## 2023-09-14 ASSESSMENT — PAIN SCALES - GENERAL: PAINLEVEL_OUTOF10: 0

## 2023-09-14 ASSESSMENT — PAIN - FUNCTIONAL ASSESSMENT: PAIN_FUNCTIONAL_ASSESSMENT: 0-10

## 2023-09-14 NOTE — OP NOTE
PROCEDURE NOTE      Patient Name: Tristian Hartley    Date of Procedure: September 14, 2023    Preoperative Diagnosis:  M48.062      Post Operative Diagnosis:  same  Procedure :    Right L4 Selective Nerve Root Block  right L5 Selective Nerve Root Block  right S1 Selective Nerve Root Block      Consent:  Informed consent was obtained prior to the procedure. The patient was given the opportunity to ask questions regarding the procedure and its associated risks. In addition to the potential risks associated with the procedure itself, the patient was informed both verbally and in writing of the potential side effects of the use of glucocorticoid. The patient appeared to comprehend the informed consent and desired to have the procedure performed. Procedure: The patient was placed in the prone position on the fluoroscopy table and the back was prepped and draped in the usual sterile manner. The exact spinal level was  identified using fluoroscopy, and Lidocaine 1 % was injected locally, a # 22 gauge spinal needle was passed to the transverse process. The depth was noted and the needle redirected to pass inferior and approximately one cm anterior to the transverse process. No  1 cc of Isovue M-200 was used to verify positioning in the epidural and paravertebral space and outlined the course of the spinal nerve into the epidural space. The same procedure was repeated at each spinal level indicated above. A total of 10 mg of preservative free Dexamethasone and 3 cc of Lidocaine was slowly injected. The patient tolerated the procedure well. The injection area was cleaned and bandaids applied. Not excessive bleeding was noted. Patient dressed and discharged to home with instructions. Discussion: The patient tolerated the procedure well.                                               Christa Mcgrath MD  September 14, 2023

## 2023-11-02 ENCOUNTER — OFFICE VISIT (OUTPATIENT)
Age: 88
End: 2023-11-02
Payer: MEDICARE

## 2023-11-02 VITALS
WEIGHT: 127.4 LBS | TEMPERATURE: 97.2 F | OXYGEN SATURATION: 97 % | HEART RATE: 62 BPM | BODY MASS INDEX: 23.45 KG/M2 | HEIGHT: 62 IN

## 2023-11-02 DIAGNOSIS — M48.07 FORAMINAL STENOSIS OF LUMBOSACRAL REGION: ICD-10-CM

## 2023-11-02 DIAGNOSIS — M54.16 LUMBAR NEURITIS: ICD-10-CM

## 2023-11-02 DIAGNOSIS — M47.817 LUMBOSACRAL SPONDYLOSIS WITHOUT MYELOPATHY: ICD-10-CM

## 2023-11-02 DIAGNOSIS — M47.816 FACET ARTHROPATHY, LUMBAR: ICD-10-CM

## 2023-11-02 DIAGNOSIS — M48.062 SPINAL STENOSIS OF LUMBAR REGION WITH NEUROGENIC CLAUDICATION: Primary | ICD-10-CM

## 2023-11-02 DIAGNOSIS — M51.36 DDD (DEGENERATIVE DISC DISEASE), LUMBAR: ICD-10-CM

## 2023-11-02 PROCEDURE — 1123F ACP DISCUSS/DSCN MKR DOCD: CPT | Performed by: PHYSICAL MEDICINE & REHABILITATION

## 2023-11-02 PROCEDURE — 99214 OFFICE O/P EST MOD 30 MIN: CPT | Performed by: PHYSICAL MEDICINE & REHABILITATION

## 2023-11-02 RX ORDER — TRAMADOL HYDROCHLORIDE 50 MG/1
50 TABLET ORAL 2 TIMES DAILY PRN
Qty: 60 TABLET | Refills: 1 | Status: SHIPPED | OUTPATIENT
Start: 2023-11-02 | End: 2024-01-01

## 2024-01-03 NOTE — PROGRESS NOTES
stenosis of lumbar region with neurogenic claudication    Foraminal stenosis of lumbosacral region    Facet arthropathy, lumbar    DDD (degenerative disc disease), lumbar    Lumbosacral spondylosis without myelopathy    Lumbar neuritis    Stage 3b chronic kidney disease (HCC)    Spondylolisthesis, lumbar region          IMPRESSION AND PLAN:  Patient returns to the office today with c/o low back pain radiating to the RLE in a circumferential distribution. Patient says she also has LLE symptoms but they are intermittent and not as bad as the RLE. Patient says her RLE symptoms are worse than her low back pain. Multiple treatment options were discussed. She is not interested in surgery at this time. I am doubtful that stronger narcotics will be tolerated because she was unable to tolerate the Tramadol. I discussed how her treatment options are limited secondary to renal function. She had no relief with the previous SNRB, so I discussed trying an epidural. Patient elected to proceed with blocks. I will order L2-3 epidural. I discussed ordering an EMG in the future.  I will also ask Dr. Prince, PCP for updates GFR results. Patient is neurologically intact.  I will see the patient back after the Epidural or earlier if needed.     Written by aDniel Cannon, as dictated by Walter Vazquez MD  I examined the patient, reviewed and agree with the note.

## 2024-01-04 ENCOUNTER — OFFICE VISIT (OUTPATIENT)
Age: 89
End: 2024-01-04
Payer: MEDICARE

## 2024-01-04 VITALS
TEMPERATURE: 97.3 F | BODY MASS INDEX: 23.55 KG/M2 | HEART RATE: 63 BPM | WEIGHT: 128 LBS | OXYGEN SATURATION: 95 % | HEIGHT: 62 IN

## 2024-01-04 DIAGNOSIS — M47.817 LUMBOSACRAL SPONDYLOSIS WITHOUT MYELOPATHY: ICD-10-CM

## 2024-01-04 DIAGNOSIS — M47.816 FACET ARTHROPATHY, LUMBAR: ICD-10-CM

## 2024-01-04 DIAGNOSIS — M54.16 LUMBAR NEURITIS: ICD-10-CM

## 2024-01-04 DIAGNOSIS — M48.062 SPINAL STENOSIS OF LUMBAR REGION WITH NEUROGENIC CLAUDICATION: Primary | ICD-10-CM

## 2024-01-04 DIAGNOSIS — M48.07 FORAMINAL STENOSIS OF LUMBOSACRAL REGION: ICD-10-CM

## 2024-01-04 DIAGNOSIS — N18.32 STAGE 3B CHRONIC KIDNEY DISEASE (HCC): ICD-10-CM

## 2024-01-04 DIAGNOSIS — M51.36 DDD (DEGENERATIVE DISC DISEASE), LUMBAR: ICD-10-CM

## 2024-01-04 DIAGNOSIS — M43.16 SPONDYLOLISTHESIS, LUMBAR REGION: ICD-10-CM

## 2024-01-04 PROCEDURE — 1123F ACP DISCUSS/DSCN MKR DOCD: CPT | Performed by: PHYSICAL MEDICINE & REHABILITATION

## 2024-01-04 PROCEDURE — 99214 OFFICE O/P EST MOD 30 MIN: CPT | Performed by: PHYSICAL MEDICINE & REHABILITATION

## 2024-01-16 RX ORDER — LIDOCAINE HYDROCHLORIDE 10 MG/ML
30 INJECTION, SOLUTION EPIDURAL; INFILTRATION; INTRACAUDAL; PERINEURAL ONCE
Status: CANCELLED | OUTPATIENT
Start: 2024-01-18

## 2024-01-16 RX ORDER — DIAZEPAM 5 MG/1
5 TABLET ORAL ONCE
Status: CANCELLED | OUTPATIENT
Start: 2024-01-18

## 2024-01-16 RX ORDER — DIAZEPAM 5 MG/1
10 TABLET ORAL ONCE
Status: CANCELLED | OUTPATIENT
Start: 2024-01-18

## 2024-01-16 RX ORDER — DIAZEPAM 5 MG/1
2.5 TABLET ORAL ONCE
Status: CANCELLED | OUTPATIENT
Start: 2024-01-18

## 2024-01-16 RX ORDER — DEXAMETHASONE SODIUM PHOSPHATE 10 MG/ML
10 INJECTION, SOLUTION INTRAMUSCULAR; INTRAVENOUS ONCE
Status: CANCELLED | OUTPATIENT
Start: 2024-01-18

## 2024-01-18 ENCOUNTER — HOSPITAL ENCOUNTER (OUTPATIENT)
Facility: HOSPITAL | Age: 89
End: 2024-01-18
Payer: MEDICARE

## 2024-01-18 ENCOUNTER — HOSPITAL ENCOUNTER (OUTPATIENT)
Facility: HOSPITAL | Age: 89
Discharge: HOME | End: 2024-01-18
Payer: MEDICARE

## 2024-01-18 VITALS
RESPIRATION RATE: 16 BRPM | TEMPERATURE: 97.5 F | OXYGEN SATURATION: 95 % | DIASTOLIC BLOOD PRESSURE: 70 MMHG | HEART RATE: 69 BPM | SYSTOLIC BLOOD PRESSURE: 164 MMHG

## 2024-01-18 PROCEDURE — 62323 NJX INTERLAMINAR LMBR/SAC: CPT | Performed by: PHYSICAL MEDICINE & REHABILITATION

## 2024-01-18 PROCEDURE — 62323 NJX INTERLAMINAR LMBR/SAC: CPT

## 2024-01-18 PROCEDURE — 2500000003 HC RX 250 WO HCPCS: Performed by: PHYSICAL MEDICINE & REHABILITATION

## 2024-01-18 PROCEDURE — 6370000000 HC RX 637 (ALT 250 FOR IP): Performed by: PHYSICAL MEDICINE & REHABILITATION

## 2024-01-18 PROCEDURE — 6360000002 HC RX W HCPCS: Performed by: PHYSICAL MEDICINE & REHABILITATION

## 2024-01-18 RX ORDER — DIAZEPAM 5 MG/1
2.5 TABLET ORAL ONCE
Status: COMPLETED | OUTPATIENT
Start: 2024-01-18 | End: 2024-01-18

## 2024-01-18 RX ORDER — DIAZEPAM 5 MG/1
10 TABLET ORAL ONCE
Status: COMPLETED | OUTPATIENT
Start: 2024-01-18 | End: 2024-01-18

## 2024-01-18 RX ORDER — DEXAMETHASONE SODIUM PHOSPHATE 10 MG/ML
10 INJECTION, SOLUTION INTRAMUSCULAR; INTRAVENOUS ONCE
Status: COMPLETED | OUTPATIENT
Start: 2024-01-18 | End: 2024-01-18

## 2024-01-18 RX ORDER — MULTIVIT-MIN/IRON/FOLIC ACID/K 18-600-40
50 CAPSULE ORAL
COMMUNITY

## 2024-01-18 RX ORDER — LIDOCAINE HYDROCHLORIDE 10 MG/ML
30 INJECTION, SOLUTION EPIDURAL; INFILTRATION; INTRACAUDAL; PERINEURAL ONCE
Status: COMPLETED | OUTPATIENT
Start: 2024-01-18 | End: 2024-01-18

## 2024-01-18 RX ORDER — DIAZEPAM 5 MG/1
5 TABLET ORAL ONCE
Status: COMPLETED | OUTPATIENT
Start: 2024-01-18 | End: 2024-01-18

## 2024-01-18 RX ADMIN — LIDOCAINE HYDROCHLORIDE 12 ML: 10 INJECTION, SOLUTION EPIDURAL; INFILTRATION; INTRACAUDAL; PERINEURAL at 13:02

## 2024-01-18 RX ADMIN — DIAZEPAM 2.5 MG: 5 TABLET ORAL at 12:06

## 2024-01-18 RX ADMIN — DEXAMETHASONE SODIUM PHOSPHATE 10 MG: 10 INJECTION, SOLUTION INTRAMUSCULAR; INTRAVENOUS at 13:04

## 2024-01-18 ASSESSMENT — PAIN SCALES - GENERAL: PAINLEVEL_OUTOF10: 0

## 2024-01-18 ASSESSMENT — PAIN - FUNCTIONAL ASSESSMENT: PAIN_FUNCTIONAL_ASSESSMENT: 0-10

## 2024-01-18 NOTE — OP NOTE
Intralaminar Epidural Steroid Block Procedure Note      Patient Name: Rosie Resendiz    Date of Procedure: January 18, 2024    Preoperative Diagnosis: M48.062    Post Operative Diagnosis: same    Procedure: L2-L3 Epidural Block     PROCEDURE:  Lumbar Epidural Block    Consent:  Informed  Consent was obtained prior to the procedure.  The patient was given the opportunity to ask questions regarding the procedure and its associated risks.  In addition to the potential risks associated with the procedure itself, the patient was informed both verbally and in writing of potential side effects of the use glucocorticoids.  The patient appeared to comprehend the informed consent and desired to have the procedure performed.      The patient was placed in the prone position on the fluoroscopy table and the back prepped and draped in the usual sterile manner.  The interlaminar space was identified using fluoroscopy and the skin anesthetized with 1% Lidocaine.  A #18 Tuohy Epidural needle was advanced under fluoroscopic control from a paramedian approach to the  epidural space as noted above, using the loss of resistance to fluid technique.    Then, 10 mg of preservative free Dexamethasone and 2 cc of Lidocaine was slowly injected.      The patient tolerated the procedure well.  The injection area was cleaned and band aids applied.  No excessive bleeding was noted.  Patient dressed and was discharged to home with instructions.

## 2024-02-29 ENCOUNTER — OFFICE VISIT (OUTPATIENT)
Age: 89
End: 2024-02-29
Payer: MEDICARE

## 2024-02-29 VITALS
OXYGEN SATURATION: 95 % | HEART RATE: 61 BPM | HEIGHT: 62 IN | BODY MASS INDEX: 23.55 KG/M2 | TEMPERATURE: 97.1 F | WEIGHT: 128 LBS

## 2024-02-29 DIAGNOSIS — M47.817 LUMBOSACRAL SPONDYLOSIS WITHOUT MYELOPATHY: ICD-10-CM

## 2024-02-29 DIAGNOSIS — N18.32 STAGE 3B CHRONIC KIDNEY DISEASE (HCC): ICD-10-CM

## 2024-02-29 DIAGNOSIS — M43.16 SPONDYLOLISTHESIS, LUMBAR REGION: ICD-10-CM

## 2024-02-29 DIAGNOSIS — M54.16 LUMBAR NEURITIS: ICD-10-CM

## 2024-02-29 DIAGNOSIS — M48.062 SPINAL STENOSIS OF LUMBAR REGION WITH NEUROGENIC CLAUDICATION: Primary | ICD-10-CM

## 2024-02-29 DIAGNOSIS — M47.816 FACET ARTHROPATHY, LUMBAR: ICD-10-CM

## 2024-02-29 DIAGNOSIS — M48.07 FORAMINAL STENOSIS OF LUMBOSACRAL REGION: ICD-10-CM

## 2024-02-29 DIAGNOSIS — M51.36 DDD (DEGENERATIVE DISC DISEASE), LUMBAR: ICD-10-CM

## 2024-02-29 PROCEDURE — 1123F ACP DISCUSS/DSCN MKR DOCD: CPT | Performed by: PHYSICAL MEDICINE & REHABILITATION

## 2024-02-29 PROCEDURE — 99213 OFFICE O/P EST LOW 20 MIN: CPT | Performed by: PHYSICAL MEDICINE & REHABILITATION

## 2024-02-29 NOTE — PROGRESS NOTES
[facility]  [address]  [place] [id number]  Tel: [phone]  Fax: [phone]          PROGRESS NOTE      HISTORY OF PRESENT ILLNESS:  The patient is a 89 y.o. female and was seen today for follow up of low back pain radiating to the RLE in a circumferential distribution. Patient says she also has LLE symptoms but they are intermittent and not as bad as the RLE. Patient says her RLE symptoms are worse than her low back pain. Patient notes RLE symptoms started around a year ago (July 2022) and low back pain started at the end of April 2023. Patient notes her pain is progressive in nature. Her pain is exacerbated by walking, relieved with sitting. Positive shopping cart sign. Pt denies change in bowel or bladder habits. Patient denies recent fevers, weight loss, rashes or skin sores. No stomach ulcers or bleeding disorders. No history of spinal surgery. Pt underwent right L4 SNRB, right L5 SNRB, and Right S1 SNRB on 9/14/2023 with no relief. Patient denies recent physical therapy or chiropractic care. Patient takes Mobic w/o relief. The patient is R/LHD. PmHx of Stage 3 renal disease (GFR of 33 on 5/25/2023), HTN, DM (A1C of 6.1 on 5/25/2023. Patient did not tolerate TRAMADOL 50 mg BID PRN secondary to nausea, constipation, and excessive drowsiness. Note from Dr. Prince dated 6/1/2023 indicating patient was seen with c/o left sided lower back pain, right thigh and RLE pain. worse with walking. symptoms noted since the office on 1/26/2023. Patient was evaluated by a vascular specialist and reported, \"circulation problems and leg pain is coming from back.\" Patient's pain is located to the right anterior thigh, pain is aching and throbbing. Lumbar spine plain films dated 7/13/2023. 2 views: AP and Lateral. revealed: Grade 1 anterolisthesis L4 on L5 and Grade 1 anterolisthesis L5 on S1. Disc space collapse L5-S1. Lumbar spine MRI dated 7/28/2023 films

## 2024-12-08 ENCOUNTER — HOSPITAL ENCOUNTER (INPATIENT)
Facility: HOSPITAL | Age: 88
LOS: 2 days | Discharge: HOME | DRG: 312 | End: 2024-12-10
Attending: STUDENT IN AN ORGANIZED HEALTH CARE EDUCATION/TRAINING PROGRAM | Admitting: INTERNAL MEDICINE
Payer: MEDICARE

## 2024-12-08 ENCOUNTER — APPOINTMENT (OUTPATIENT)
Facility: HOSPITAL | Age: 88
DRG: 312 | End: 2024-12-08
Payer: MEDICARE

## 2024-12-08 DIAGNOSIS — R42 DIZZINESS: ICD-10-CM

## 2024-12-08 DIAGNOSIS — R29.90 STROKE-LIKE SYMPTOM: Primary | ICD-10-CM

## 2024-12-08 DIAGNOSIS — R51.9 HEADACHE BEHIND THE EYES: ICD-10-CM

## 2024-12-08 DIAGNOSIS — R29.90 STROKE-LIKE SYMPTOMS: ICD-10-CM

## 2024-12-08 DIAGNOSIS — G43.909 MIGRAINE WITHOUT STATUS MIGRAINOSUS, NOT INTRACTABLE, UNSPECIFIED MIGRAINE TYPE: ICD-10-CM

## 2024-12-08 LAB
ANION GAP SERPL CALC-SCNC: 6 MMOL/L (ref 3–18)
APPEARANCE UR: CLEAR
BASOPHILS # BLD: 0 K/UL (ref 0–0.1)
BASOPHILS NFR BLD: 1 % (ref 0–2)
BILIRUB UR QL: NEGATIVE
BUN SERPL-MCNC: 27 MG/DL (ref 7–18)
BUN/CREAT SERPL: 15 (ref 12–20)
CALCIUM SERPL-MCNC: 9.3 MG/DL (ref 8.5–10.1)
CHLORIDE SERPL-SCNC: 111 MMOL/L (ref 100–111)
CHP ED QC CHECK: YES
CO2 SERPL-SCNC: 24 MMOL/L (ref 21–32)
COLOR UR: YELLOW
CREAT SERPL-MCNC: 1.82 MG/DL (ref 0.6–1.3)
DIFFERENTIAL METHOD BLD: ABNORMAL
EKG ATRIAL RATE: 65 BPM
EKG DIAGNOSIS: NORMAL
EKG P AXIS: 39 DEGREES
EKG P-R INTERVAL: 192 MS
EKG Q-T INTERVAL: 396 MS
EKG QRS DURATION: 90 MS
EKG QTC CALCULATION (BAZETT): 411 MS
EKG R AXIS: -1 DEGREES
EKG T AXIS: 39 DEGREES
EKG VENTRICULAR RATE: 65 BPM
EOSINOPHIL # BLD: 0.1 K/UL (ref 0–0.4)
EOSINOPHIL NFR BLD: 2 % (ref 0–5)
ERYTHROCYTE [DISTWIDTH] IN BLOOD BY AUTOMATED COUNT: 14.7 % (ref 11.6–14.5)
ERYTHROCYTE [SEDIMENTATION RATE] IN BLOOD: 50 MM/HR (ref 0–30)
ETHANOL SERPL-MCNC: <3 MG/DL (ref 0–3)
GLUCOSE BLD STRIP.AUTO-MCNC: 132 MG/DL (ref 70–110)
GLUCOSE BLD-MCNC: 139 MG/DL
GLUCOSE SERPL-MCNC: 139 MG/DL (ref 74–99)
GLUCOSE UR STRIP.AUTO-MCNC: NEGATIVE MG/DL
HCT VFR BLD AUTO: 38.6 % (ref 35–45)
HGB BLD-MCNC: 12.3 G/DL (ref 12–16)
HGB UR QL STRIP: NEGATIVE
IMM GRANULOCYTES # BLD AUTO: 0 K/UL (ref 0–0.04)
IMM GRANULOCYTES NFR BLD AUTO: 0 % (ref 0–0.5)
INR PPP: 1 (ref 0.9–1.1)
KETONES UR QL STRIP.AUTO: NEGATIVE MG/DL
LEUKOCYTE ESTERASE UR QL STRIP.AUTO: NEGATIVE
LYMPHOCYTES # BLD: 1.3 K/UL (ref 0.9–3.6)
LYMPHOCYTES NFR BLD: 28 % (ref 21–52)
MAGNESIUM SERPL-MCNC: 2 MG/DL (ref 1.6–2.6)
MCH RBC QN AUTO: 33.1 PG (ref 24–34)
MCHC RBC AUTO-ENTMCNC: 31.9 G/DL (ref 31–37)
MCV RBC AUTO: 103.8 FL (ref 78–100)
MONOCYTES # BLD: 0.5 K/UL (ref 0.05–1.2)
MONOCYTES NFR BLD: 10 % (ref 3–10)
NEUTS SEG # BLD: 2.7 K/UL (ref 1.8–8)
NEUTS SEG NFR BLD: 60 % (ref 40–73)
NITRITE UR QL STRIP.AUTO: NEGATIVE
NRBC # BLD: 0 K/UL (ref 0–0.01)
NRBC BLD-RTO: 0 PER 100 WBC
PH UR STRIP: 5.5 (ref 5–8)
PLATELET # BLD AUTO: 211 K/UL (ref 135–420)
PMV BLD AUTO: 10.7 FL (ref 9.2–11.8)
POTASSIUM SERPL-SCNC: 4.4 MMOL/L (ref 3.5–5.5)
PROT UR STRIP-MCNC: NEGATIVE MG/DL
PROTHROMBIN TIME: 12.9 SEC (ref 11.9–14.9)
RBC # BLD AUTO: 3.72 M/UL (ref 4.2–5.3)
SODIUM SERPL-SCNC: 141 MMOL/L (ref 136–145)
SP GR UR REFRACTOMETRY: 1.02 (ref 1–1.03)
T4 FREE SERPL-MCNC: 1.1 NG/DL (ref 0.7–1.5)
TROPONIN I SERPL HS-MCNC: 10 NG/L (ref 0–54)
TSH SERPL DL<=0.05 MIU/L-ACNC: 3.55 UIU/ML (ref 0.36–3.74)
UROBILINOGEN UR QL STRIP.AUTO: 0.2 EU/DL (ref 0.2–1)
WBC # BLD AUTO: 4.6 K/UL (ref 4.6–13.2)

## 2024-12-08 PROCEDURE — 99285 EMERGENCY DEPT VISIT HI MDM: CPT

## 2024-12-08 PROCEDURE — 94761 N-INVAS EAR/PLS OXIMETRY MLT: CPT

## 2024-12-08 PROCEDURE — 84484 ASSAY OF TROPONIN QUANT: CPT

## 2024-12-08 PROCEDURE — 93005 ELECTROCARDIOGRAM TRACING: CPT | Performed by: STUDENT IN AN ORGANIZED HEALTH CARE EDUCATION/TRAINING PROGRAM

## 2024-12-08 PROCEDURE — 84439 ASSAY OF FREE THYROXINE: CPT

## 2024-12-08 PROCEDURE — 2580000003 HC RX 258: Performed by: INTERNAL MEDICINE

## 2024-12-08 PROCEDURE — 1100000003 HC PRIVATE W/ TELEMETRY

## 2024-12-08 PROCEDURE — 96374 THER/PROPH/DIAG INJ IV PUSH: CPT

## 2024-12-08 PROCEDURE — 6360000004 HC RX CONTRAST MEDICATION: Performed by: STUDENT IN AN ORGANIZED HEALTH CARE EDUCATION/TRAINING PROGRAM

## 2024-12-08 PROCEDURE — 2580000003 HC RX 258: Performed by: STUDENT IN AN ORGANIZED HEALTH CARE EDUCATION/TRAINING PROGRAM

## 2024-12-08 PROCEDURE — 1100000000 HC RM PRIVATE

## 2024-12-08 PROCEDURE — 83735 ASSAY OF MAGNESIUM: CPT

## 2024-12-08 PROCEDURE — 82077 ASSAY SPEC XCP UR&BREATH IA: CPT

## 2024-12-08 PROCEDURE — 70498 CT ANGIOGRAPHY NECK: CPT

## 2024-12-08 PROCEDURE — 96375 TX/PRO/DX INJ NEW DRUG ADDON: CPT

## 2024-12-08 PROCEDURE — G0378 HOSPITAL OBSERVATION PER HR: HCPCS

## 2024-12-08 PROCEDURE — 99223 1ST HOSP IP/OBS HIGH 75: CPT | Performed by: INTERNAL MEDICINE

## 2024-12-08 PROCEDURE — 81003 URINALYSIS AUTO W/O SCOPE: CPT

## 2024-12-08 PROCEDURE — 85610 PROTHROMBIN TIME: CPT

## 2024-12-08 PROCEDURE — 6360000002 HC RX W HCPCS: Performed by: INTERNAL MEDICINE

## 2024-12-08 PROCEDURE — 84443 ASSAY THYROID STIM HORMONE: CPT

## 2024-12-08 PROCEDURE — 6370000000 HC RX 637 (ALT 250 FOR IP): Performed by: STUDENT IN AN ORGANIZED HEALTH CARE EDUCATION/TRAINING PROGRAM

## 2024-12-08 PROCEDURE — 80048 BASIC METABOLIC PNL TOTAL CA: CPT

## 2024-12-08 PROCEDURE — 82962 GLUCOSE BLOOD TEST: CPT

## 2024-12-08 PROCEDURE — 85652 RBC SED RATE AUTOMATED: CPT

## 2024-12-08 PROCEDURE — 87086 URINE CULTURE/COLONY COUNT: CPT

## 2024-12-08 PROCEDURE — 85025 COMPLETE CBC W/AUTO DIFF WBC: CPT

## 2024-12-08 PROCEDURE — 93010 ELECTROCARDIOGRAM REPORT: CPT | Performed by: INTERNAL MEDICINE

## 2024-12-08 PROCEDURE — 6360000002 HC RX W HCPCS: Performed by: STUDENT IN AN ORGANIZED HEALTH CARE EDUCATION/TRAINING PROGRAM

## 2024-12-08 PROCEDURE — 70450 CT HEAD/BRAIN W/O DYE: CPT

## 2024-12-08 RX ORDER — SODIUM CHLORIDE 0.9 % (FLUSH) 0.9 %
5-40 SYRINGE (ML) INJECTION PRN
Status: DISCONTINUED | OUTPATIENT
Start: 2024-12-08 | End: 2024-12-10 | Stop reason: HOSPADM

## 2024-12-08 RX ORDER — LORAZEPAM 2 MG/ML
1 CONCENTRATE ORAL
Status: ACTIVE | OUTPATIENT
Start: 2024-12-08 | End: 2024-12-09

## 2024-12-08 RX ORDER — ONDANSETRON 2 MG/ML
4 INJECTION INTRAMUSCULAR; INTRAVENOUS EVERY 6 HOURS PRN
Status: DISCONTINUED | OUTPATIENT
Start: 2024-12-08 | End: 2024-12-10 | Stop reason: HOSPADM

## 2024-12-08 RX ORDER — ACETAMINOPHEN 325 MG/1
650 TABLET ORAL EVERY 6 HOURS PRN
Status: DISCONTINUED | OUTPATIENT
Start: 2024-12-08 | End: 2024-12-10 | Stop reason: HOSPADM

## 2024-12-08 RX ORDER — IODIXANOL 320 MG/ML
80 INJECTION, SOLUTION INTRAVASCULAR
Status: COMPLETED | OUTPATIENT
Start: 2024-12-08 | End: 2024-12-08

## 2024-12-08 RX ORDER — METOCLOPRAMIDE HYDROCHLORIDE 5 MG/ML
5 INJECTION INTRAMUSCULAR; INTRAVENOUS
Status: COMPLETED | OUTPATIENT
Start: 2024-12-08 | End: 2024-12-08

## 2024-12-08 RX ORDER — AMLODIPINE BESYLATE 10 MG/1
10 TABLET ORAL DAILY
Status: DISCONTINUED | OUTPATIENT
Start: 2024-12-08 | End: 2024-12-10 | Stop reason: HOSPADM

## 2024-12-08 RX ORDER — ACETAMINOPHEN 500 MG
1000 TABLET ORAL
Status: COMPLETED | OUTPATIENT
Start: 2024-12-08 | End: 2024-12-08

## 2024-12-08 RX ORDER — METOPROLOL SUCCINATE 50 MG/1
50 TABLET, EXTENDED RELEASE ORAL DAILY
Status: DISCONTINUED | OUTPATIENT
Start: 2024-12-08 | End: 2024-12-10 | Stop reason: HOSPADM

## 2024-12-08 RX ORDER — LEVOTHYROXINE SODIUM 75 UG/1
75 TABLET ORAL
Status: DISCONTINUED | OUTPATIENT
Start: 2024-12-08 | End: 2024-12-10 | Stop reason: HOSPADM

## 2024-12-08 RX ORDER — POLYETHYLENE GLYCOL 3350 17 G/17G
17 POWDER, FOR SOLUTION ORAL DAILY PRN
Status: DISCONTINUED | OUTPATIENT
Start: 2024-12-08 | End: 2024-12-10 | Stop reason: HOSPADM

## 2024-12-08 RX ORDER — SODIUM CHLORIDE 0.9 % (FLUSH) 0.9 %
5-40 SYRINGE (ML) INJECTION EVERY 12 HOURS SCHEDULED
Status: DISCONTINUED | OUTPATIENT
Start: 2024-12-08 | End: 2024-12-10 | Stop reason: HOSPADM

## 2024-12-08 RX ORDER — SODIUM CHLORIDE, SODIUM LACTATE, POTASSIUM CHLORIDE, AND CALCIUM CHLORIDE .6; .31; .03; .02 G/100ML; G/100ML; G/100ML; G/100ML
1000 INJECTION, SOLUTION INTRAVENOUS ONCE
Status: COMPLETED | OUTPATIENT
Start: 2024-12-08 | End: 2024-12-08

## 2024-12-08 RX ORDER — ONDANSETRON 4 MG/1
4 TABLET, ORALLY DISINTEGRATING ORAL EVERY 8 HOURS PRN
Status: DISCONTINUED | OUTPATIENT
Start: 2024-12-08 | End: 2024-12-10 | Stop reason: HOSPADM

## 2024-12-08 RX ORDER — ERGOCALCIFEROL 1.25 MG/1
50000 CAPSULE, LIQUID FILLED ORAL
Status: DISCONTINUED | OUTPATIENT
Start: 2024-12-09 | End: 2024-12-10 | Stop reason: HOSPADM

## 2024-12-08 RX ORDER — DIPHENHYDRAMINE HYDROCHLORIDE 50 MG/ML
25 INJECTION INTRAMUSCULAR; INTRAVENOUS
Status: COMPLETED | OUTPATIENT
Start: 2024-12-08 | End: 2024-12-08

## 2024-12-08 RX ORDER — HEPARIN SODIUM 5000 [USP'U]/ML
5000 INJECTION, SOLUTION INTRAVENOUS; SUBCUTANEOUS EVERY 8 HOURS SCHEDULED
Status: DISCONTINUED | OUTPATIENT
Start: 2024-12-08 | End: 2024-12-10 | Stop reason: HOSPADM

## 2024-12-08 RX ORDER — ACETAMINOPHEN 650 MG/1
650 SUPPOSITORY RECTAL EVERY 6 HOURS PRN
Status: DISCONTINUED | OUTPATIENT
Start: 2024-12-08 | End: 2024-12-10 | Stop reason: HOSPADM

## 2024-12-08 RX ORDER — SODIUM CHLORIDE 9 MG/ML
INJECTION, SOLUTION INTRAVENOUS PRN
Status: DISCONTINUED | OUTPATIENT
Start: 2024-12-08 | End: 2024-12-10 | Stop reason: HOSPADM

## 2024-12-08 RX ADMIN — ACETAMINOPHEN 1000 MG: 500 TABLET ORAL at 07:59

## 2024-12-08 RX ADMIN — METOCLOPRAMIDE 5 MG: 5 INJECTION, SOLUTION INTRAMUSCULAR; INTRAVENOUS at 08:01

## 2024-12-08 RX ADMIN — IODIXANOL 80 ML: 320 INJECTION, SOLUTION INTRAVASCULAR at 09:37

## 2024-12-08 RX ADMIN — DIPHENHYDRAMINE HYDROCHLORIDE 25 MG: 50 INJECTION INTRAMUSCULAR; INTRAVENOUS at 08:00

## 2024-12-08 RX ADMIN — HEPARIN SODIUM 5000 UNITS: 5000 INJECTION INTRAVENOUS; SUBCUTANEOUS at 21:26

## 2024-12-08 RX ADMIN — SODIUM CHLORIDE, POTASSIUM CHLORIDE, SODIUM LACTATE AND CALCIUM CHLORIDE 1000 ML: 600; 310; 30; 20 INJECTION, SOLUTION INTRAVENOUS at 08:39

## 2024-12-08 RX ADMIN — SODIUM CHLORIDE, PRESERVATIVE FREE 10 ML: 5 INJECTION INTRAVENOUS at 20:38

## 2024-12-08 ASSESSMENT — PAIN SCALES - GENERAL
PAINLEVEL_OUTOF10: 7
PAINLEVEL_OUTOF10: 0
PAINLEVEL_OUTOF10: 0

## 2024-12-08 ASSESSMENT — PAIN DESCRIPTION - DESCRIPTORS: DESCRIPTORS: ACHING

## 2024-12-08 ASSESSMENT — PAIN - FUNCTIONAL ASSESSMENT: PAIN_FUNCTIONAL_ASSESSMENT: 0-10

## 2024-12-08 ASSESSMENT — PAIN DESCRIPTION - ORIENTATION: ORIENTATION: LEFT

## 2024-12-08 ASSESSMENT — PAIN DESCRIPTION - LOCATION: LOCATION: HEAD

## 2024-12-08 NOTE — PROGRESS NOTES
MRI screening form needs to be filled out and faxed to [phone] BEFORE MRI can be scheduled. If unable to obtain information from the patient, [facility] needs to be contacted. If patient is claustrophobic or will need pain meds, please have ordered in advance in order to facilitate exam.

## 2024-12-08 NOTE — ED TRIAGE NOTES
Pt arrived via Triage via wheelchair. Pt c/o headache for the last week and dizziness that she noticed after she woke up form her name. Pt laid down around 2 pm.

## 2024-12-08 NOTE — H&P
BP: 127/60 (!) 121/47 (!) 136/49 (!) 153/56   Pulse: 65 63 63 67   Resp: 18 17 15 19   Temp:       TempSrc:       SpO2: 96%  90% 96%   Weight:       Height:            Physical Exam:  General:  AAOx2   Cardiovascular:  S1S2+, RRR  Pulmonary:  CTA b/l  GI:  Soft, BS+, NT, ND  Extremities:  No edema           CBC:  Lab Results   Component Value Date/Time    WBC 4.6 12/08/2024 07:20 AM    HGB 12.3 12/08/2024 07:20 AM    HCT 38.6 12/08/2024 07:20 AM     12/08/2024 07:20 AM    .8 12/08/2024 07:20 AM        CMP:  Lab Results   Component Value Date/Time     12/08/2024 07:20 AM    K 4.4 12/08/2024 07:20 AM     12/08/2024 07:20 AM    CO2 24 12/08/2024 07:20 AM    BUN 27 12/08/2024 07:20 AM        PT/INR  Lab Results   Component Value Date/Time    INR 1.0 12/08/2024 07:20 AM                 Assessment and  Plan:     1 Headache   2 Dizziness   3 Lumbar pain Chronic - on active treatment with Pain management   4 Dysphagia - Sp Esophageal dilatation   5 Hypothyroid on replacement   6 HTN   7 CKD3 ( Do not have old reading )   8 Macrocytosis   9 Elevated ESR     PLAN   -Admit to telemetry unit Tylenol as needed for headache  -Follow MRI brain, patient's CTA head and neck shows no large vessel occlusion, vertebral artery narrowing.  -MRI brain, echocardiogram, ordered      Total time 75 minutes taking care of patient in following activities:  1-Preparing to see the patient (review of tests, prior medical visits)  2-Obtaining and/or reviewing separately medically appropriate obtained history  3-Performing the medically appropriate exam and/or evaluation  4-Clinical documentation in the EHR or other health record  5-Counseling and educating the patient or caregiver  6-Interpreting results and/or communicating results to the patient/family/caregiver  7-Care coordination  8-Ordering medications, tests, or procedures  9-Referring and communicating with other health care professionals  10-Documentation in  EHR.   Signed By: Janette Sosa MD     December 8, 2024

## 2024-12-08 NOTE — ED PROVIDER NOTES
EMERGENCY DEPARTMENT HISTORY AND PHYSICAL EXAM      Date: 12/8/2024  Patient Name: Rosie Resendiz    History of Presenting Illness     Chief Complaint   Patient presents with    Headache    Dizziness       History (Context): Rosie Resendiz is a 89 y.o. female  presents to the ED today with chief complaint of headache and dizziness.  Patient states headache began approximate 1 week ago however began to endorse dizziness as \"room spinning\" yesterday at some time in the early afternoon.  States symptoms have worsened since onset.  Denies any unilateral weakness, numbness, or tingling.  Son states that he has not noticed any dysarthria.  Not on any anticoagulation.  Patient otherwise denies any further complaints.      PCP: Jero Prince MD    No current facility-administered medications for this encounter.     Current Outpatient Medications   Medication Sig Dispense Refill    Cholecalciferol (VITAMIN D) 50 MCG (2000 UT) CAPS capsule Take 50 capsules by mouth every morning (before breakfast) Pt takes 50,000 daily (Patient not taking: Reported on 2/29/2024)      metoprolol succinate (TOPROL XL) 50 MG extended release tablet       levothyroxine (SYNTHROID) 75 MCG tablet Take 1 tablet by mouth daily      amLODIPine (NORVASC) 10 MG tablet Take 1 tablet by mouth daily      vitamin D (ERGOCALCIFEROL) 1.25 MG (80381 UT) CAPS capsule Take 1 capsule by mouth Twice a Week      metoprolol tartrate (LOPRESSOR) 50 MG tablet Take 1 tablet by mouth daily (Patient not taking: Reported on 1/18/2024)         Past History     Past Medical History:   Past Medical History:   Diagnosis Date    Gout     Hearing loss     Hypercholesterolemia     Hypertension     Nipple discharge     Dark blood intermitten    Thyroid disease     hypothyroidism       Past Surgical History:  Past Surgical History:   Procedure Laterality Date    CHOLECYSTECTOMY      COLONOSCOPY      TUMOR REMOVAL      side of abdomen       Family History:  No family history

## 2024-12-08 NOTE — ED NOTES
Attempted to call report again, phone just rings again, attempted to call supervisor, reached voicemail. Charge RN aware

## 2024-12-09 ENCOUNTER — APPOINTMENT (OUTPATIENT)
Facility: HOSPITAL | Age: 88
DRG: 312 | End: 2024-12-09
Attending: INTERNAL MEDICINE
Payer: MEDICARE

## 2024-12-09 ENCOUNTER — APPOINTMENT (OUTPATIENT)
Facility: HOSPITAL | Age: 88
DRG: 312 | End: 2024-12-09
Payer: MEDICARE

## 2024-12-09 LAB
ANION GAP SERPL CALC-SCNC: 6 MMOL/L (ref 3–18)
BACTERIA SPEC CULT: NORMAL
BASOPHILS # BLD: 0 K/UL (ref 0–0.1)
BASOPHILS NFR BLD: 1 % (ref 0–2)
BUN SERPL-MCNC: 20 MG/DL (ref 7–18)
BUN/CREAT SERPL: 14 (ref 12–20)
CALCIUM SERPL-MCNC: 9.4 MG/DL (ref 8.5–10.1)
CHLORIDE SERPL-SCNC: 109 MMOL/L (ref 100–111)
CHOLEST SERPL-MCNC: 164 MG/DL
CO2 SERPL-SCNC: 23 MMOL/L (ref 21–32)
CREAT SERPL-MCNC: 1.39 MG/DL (ref 0.6–1.3)
DIFFERENTIAL METHOD BLD: ABNORMAL
EOSINOPHIL # BLD: 0.1 K/UL (ref 0–0.4)
EOSINOPHIL NFR BLD: 1 % (ref 0–5)
ERYTHROCYTE [DISTWIDTH] IN BLOOD BY AUTOMATED COUNT: 14.6 % (ref 11.6–14.5)
GLUCOSE SERPL-MCNC: 86 MG/DL (ref 74–99)
HCT VFR BLD AUTO: 38.2 % (ref 35–45)
HDLC SERPL-MCNC: 83 MG/DL (ref 40–60)
HDLC SERPL: 2 (ref 0–5)
HGB BLD-MCNC: 12.4 G/DL (ref 12–16)
IMM GRANULOCYTES # BLD AUTO: 0 K/UL (ref 0–0.04)
IMM GRANULOCYTES NFR BLD AUTO: 0 % (ref 0–0.5)
LDLC SERPL CALC-MCNC: 55.2 MG/DL (ref 0–100)
LIPID PANEL: ABNORMAL
LYMPHOCYTES # BLD: 1.2 K/UL (ref 0.9–3.6)
LYMPHOCYTES NFR BLD: 25 % (ref 21–52)
MCH RBC QN AUTO: 33.6 PG (ref 24–34)
MCHC RBC AUTO-ENTMCNC: 32.5 G/DL (ref 31–37)
MCV RBC AUTO: 103.5 FL (ref 78–100)
MONOCYTES # BLD: 0.6 K/UL (ref 0.05–1.2)
MONOCYTES NFR BLD: 13 % (ref 3–10)
NEUTS SEG # BLD: 2.8 K/UL (ref 1.8–8)
NEUTS SEG NFR BLD: 60 % (ref 40–73)
NRBC # BLD: 0 K/UL (ref 0–0.01)
NRBC BLD-RTO: 0 PER 100 WBC
PLATELET # BLD AUTO: 191 K/UL (ref 135–420)
PMV BLD AUTO: 10.9 FL (ref 9.2–11.8)
POTASSIUM SERPL-SCNC: 4 MMOL/L (ref 3.5–5.5)
RBC # BLD AUTO: 3.69 M/UL (ref 4.2–5.3)
SERVICE CMNT-IMP: NORMAL
SODIUM SERPL-SCNC: 138 MMOL/L (ref 136–145)
TRIGL SERPL-MCNC: 129 MG/DL
VLDLC SERPL CALC-MCNC: 25.8 MG/DL
WBC # BLD AUTO: 4.7 K/UL (ref 4.6–13.2)

## 2024-12-09 PROCEDURE — 70551 MRI BRAIN STEM W/O DYE: CPT

## 2024-12-09 PROCEDURE — 94761 N-INVAS EAR/PLS OXIMETRY MLT: CPT

## 2024-12-09 PROCEDURE — 1100000003 HC PRIVATE W/ TELEMETRY

## 2024-12-09 PROCEDURE — 6370000000 HC RX 637 (ALT 250 FOR IP): Performed by: HOSPITALIST

## 2024-12-09 PROCEDURE — 92610 EVALUATE SWALLOWING FUNCTION: CPT

## 2024-12-09 PROCEDURE — 2580000003 HC RX 258: Performed by: INTERNAL MEDICINE

## 2024-12-09 PROCEDURE — 93306 TTE W/DOPPLER COMPLETE: CPT

## 2024-12-09 PROCEDURE — G0378 HOSPITAL OBSERVATION PER HR: HCPCS

## 2024-12-09 PROCEDURE — 97166 OT EVAL MOD COMPLEX 45 MIN: CPT

## 2024-12-09 PROCEDURE — 97116 GAIT TRAINING THERAPY: CPT

## 2024-12-09 PROCEDURE — 36415 COLL VENOUS BLD VENIPUNCTURE: CPT

## 2024-12-09 PROCEDURE — 99232 SBSQ HOSP IP/OBS MODERATE 35: CPT | Performed by: HOSPITALIST

## 2024-12-09 PROCEDURE — 80061 LIPID PANEL: CPT

## 2024-12-09 PROCEDURE — 96372 THER/PROPH/DIAG INJ SC/IM: CPT

## 2024-12-09 PROCEDURE — 97161 PT EVAL LOW COMPLEX 20 MIN: CPT

## 2024-12-09 PROCEDURE — 92526 ORAL FUNCTION THERAPY: CPT

## 2024-12-09 PROCEDURE — 97535 SELF CARE MNGMENT TRAINING: CPT

## 2024-12-09 PROCEDURE — 1100000000 HC RM PRIVATE

## 2024-12-09 PROCEDURE — 85025 COMPLETE CBC W/AUTO DIFF WBC: CPT

## 2024-12-09 PROCEDURE — 6360000002 HC RX W HCPCS: Performed by: INTERNAL MEDICINE

## 2024-12-09 PROCEDURE — 2580000003 HC RX 258: Performed by: HOSPITALIST

## 2024-12-09 PROCEDURE — 6370000000 HC RX 637 (ALT 250 FOR IP): Performed by: INTERNAL MEDICINE

## 2024-12-09 PROCEDURE — 80048 BASIC METABOLIC PNL TOTAL CA: CPT

## 2024-12-09 RX ORDER — ATORVASTATIN CALCIUM 40 MG/1
40 TABLET, FILM COATED ORAL NIGHTLY
Status: DISCONTINUED | OUTPATIENT
Start: 2024-12-09 | End: 2024-12-10 | Stop reason: HOSPADM

## 2024-12-09 RX ORDER — SODIUM CHLORIDE 9 MG/ML
INJECTION, SOLUTION INTRAVENOUS CONTINUOUS
Status: DISPENSED | OUTPATIENT
Start: 2024-12-09 | End: 2024-12-10

## 2024-12-09 RX ORDER — ASPIRIN 81 MG/1
81 TABLET, CHEWABLE ORAL DAILY
Status: DISCONTINUED | OUTPATIENT
Start: 2024-12-09 | End: 2024-12-10 | Stop reason: HOSPADM

## 2024-12-09 RX ADMIN — ACETAMINOPHEN 650 MG: 325 TABLET ORAL at 08:39

## 2024-12-09 RX ADMIN — HEPARIN SODIUM 5000 UNITS: 5000 INJECTION INTRAVENOUS; SUBCUTANEOUS at 14:03

## 2024-12-09 RX ADMIN — SODIUM CHLORIDE: 9 INJECTION, SOLUTION INTRAVENOUS at 17:09

## 2024-12-09 RX ADMIN — ERGOCALCIFEROL 50000 UNITS: 1.25 CAPSULE ORAL at 08:39

## 2024-12-09 RX ADMIN — SODIUM CHLORIDE, PRESERVATIVE FREE 10 ML: 5 INJECTION INTRAVENOUS at 12:38

## 2024-12-09 RX ADMIN — LEVOTHYROXINE SODIUM 75 MCG: 75 TABLET ORAL at 08:39

## 2024-12-09 RX ADMIN — ATORVASTATIN CALCIUM 40 MG: 40 TABLET, FILM COATED ORAL at 21:37

## 2024-12-09 RX ADMIN — HEPARIN SODIUM 5000 UNITS: 5000 INJECTION INTRAVENOUS; SUBCUTANEOUS at 21:37

## 2024-12-09 RX ADMIN — ASPIRIN 81 MG CHEWABLE TABLET 81 MG: 81 TABLET CHEWABLE at 14:03

## 2024-12-09 RX ADMIN — SODIUM CHLORIDE, PRESERVATIVE FREE 10 ML: 5 INJECTION INTRAVENOUS at 21:43

## 2024-12-09 RX ADMIN — HEPARIN SODIUM 5000 UNITS: 5000 INJECTION INTRAVENOUS; SUBCUTANEOUS at 05:40

## 2024-12-09 RX ADMIN — SODIUM CHLORIDE: 9 INJECTION, SOLUTION INTRAVENOUS at 12:35

## 2024-12-09 ASSESSMENT — PAIN DESCRIPTION - ORIENTATION: ORIENTATION: ANTERIOR

## 2024-12-09 ASSESSMENT — PAIN SCALES - GENERAL
PAINLEVEL_OUTOF10: 3
PAINLEVEL_OUTOF10: 0

## 2024-12-09 ASSESSMENT — PAIN DESCRIPTION - LOCATION: LOCATION: HIP;HEAD

## 2024-12-09 ASSESSMENT — PAIN DESCRIPTION - DIRECTION: RADIATING_TOWARDS: N.A

## 2024-12-09 ASSESSMENT — PAIN DESCRIPTION - DESCRIPTORS: DESCRIPTORS: ACHING

## 2024-12-09 ASSESSMENT — PAIN - FUNCTIONAL ASSESSMENT: PAIN_FUNCTIONAL_ASSESSMENT: PREVENTS OR INTERFERES SOME ACTIVE ACTIVITIES AND ADLS

## 2024-12-09 ASSESSMENT — PAIN DESCRIPTION - FREQUENCY: FREQUENCY: INTERMITTENT

## 2024-12-09 ASSESSMENT — PAIN DESCRIPTION - ONSET: ONSET: ON-GOING

## 2024-12-09 ASSESSMENT — PAIN DESCRIPTION - PAIN TYPE: TYPE: ACUTE PAIN

## 2024-12-09 NOTE — PLAN OF CARE
Problem: Physical Therapy - Adult  Goal: By Discharge: Performs mobility at highest level of function for planned discharge setting.  See evaluation for individualized goals.  Description: Physical Therapy Goals:  Initiated 12/9/2024 to be met within 7-10 days.    1.  Patient will move from supine to sit and sit to supine , scoot up and down, and roll side to side in bed with modified independence.    2.  Patient will transfer from bed to chair and chair to bed with modified independence using the least restrictive device.  3.  Patient will perform sit to stand with modified independence.  4.  Patient will ambulate with modified independence for 50 feet with the least restrictive device.   5.  Patient will ascend/descend 3 stairs with b/l handrail(s) with contact guard assistance.    PLOF: Pt lives with Son in a single story home, 3 PEPPER with handrails.  Pt was [place] for transfers and short distance ambulation within home, has cane, needs RW.      Outcome: Progressing     PHYSICAL THERAPY EVALUATION    Patient: Rosie Resendiz (89 y.o. female)  Date: 12/9/2024  Primary Diagnosis: Dizziness [R42]  Headache behind the eyes [R51.9]  Stroke-like symptoms [R29.90]  Stroke-like symptom [R29.90]  Migraine without status migrainosus, not intractable, unspecified migraine type [G43.909]       Precautions: Fall Risk, General Precautions,  ,  ,  ,  ,  ,  ,      ASSESSMENT :  Pt sitting up in recliner upon entering room, agreeable to PT evaluation.  Pt is [place], hearing aides not charged, having to speak loudly in to R ear during evaluation.  Pt with gross b/l LE strength 4-/5, STS with RW requiring CGA/Nathaniel due to low surface height.  Pt with forward flexed posture due to chronic spinal stenosis and pain.  Pt was able to ambulate short distance within room with CGA/Nathaniel for walker negotiation requiring verbal and tactile cues to stay close to the walker for safety, due to pt not normally using a RW at home.  Pt agreeable to stay  Body Location Override (Optional - Billing Will Still Be Based On Selected Body Map Location If Applicable): left dorsal forearm

## 2024-12-09 NOTE — PLAN OF CARE
Problem: Occupational Therapy - Adult  Goal: By Discharge: Performs self-care activities at highest level of function for planned discharge setting.  See evaluation for individualized goals.  Description: Occupational Therapy Goals:  Initiated 12/9/2024 to be met within 7-10 days.    1.  Patient will perform grooming with modified independence standing at sink, F+ balance > 3 minutes.   2.  Patient will perform lower body dressing with supervision/set-up.  3.  Patient will perform upper body dressing  with modified independence.  4.  Patient will perform toilet transfers with supervision/set-up  5.  Patient will perform all aspects of toileting with modified independence.  6.  Patient will participate in upper extremity therapeutic exercise/activities with modified independence for 8-10 minutes to increase strength/endurance for ADLs.    7.  Patient will utilize energy conservation techniques during functional activities with verbal cues.      PLOF: Pt [place], son confirmed PLOF. Patient was modified independent, additional time needed with self-care and used no AD for functional mobility.  Outcome: Progressing      OCCUPATIONAL THERAPY EVALUATION    Patient: Rosie Resendiz (89 y.o. female)  Date: 12/9/2024  Primary Diagnosis: Dizziness [R42]  Headache behind the eyes [R51.9]  Stroke-like symptoms [R29.90]  Stroke-like symptom [R29.90]  Migraine without status migrainosus, not intractable, unspecified migraine type [G43.909]  Precautions: Fall Risk, General Precautions    ASSESSMENT :  Pt cleared to participate in OT evaluation by RN present. Upon entering the room, the pt was supine in bed, alert, and agreeable to participate in OT session with supportive son present. Patient extremely [place], as bilateral hearing aid batteries needs replacement. Patient with increased difficulty taking pills with RN bedside, suggested taking with jello and improvement observed. Patient stand by assist for self-feeding and stand by  Visual/Perceptual Training     [x]      Home Safety Training  [x]               Patient Education                   [x]      Family Training/Education  []               Other (comment):    Frequency/Duration: Patient will be followed by occupational therapy to address goals, 1-2 times per day/3-5 days per week to address goals.    Further Equipment Recommendations for Discharge: shower chair and rolling walker    AMPAC: AM-PAC Inpatient Daily Activity Raw Score: 18    Current research shows that an -PAC score of 18 or greater is associated with a discharge to the patient's home setting.    This AMPAC score should be considered in conjunction with interdisciplinary team recommendations to determine the most appropriate discharge setting. Patient's social support, diagnosis, medical stability, and prior level of function should also be taken into consideration.     SUBJECTIVE:   Patient stated, “Can you help me to the bathroom?”    OBJECTIVE DATA SUMMARY:     Past Medical History:   Diagnosis Date    Gout     Hearing loss     Hypercholesterolemia     Hypertension     Nipple discharge     Dark blood intermitten    Thyroid disease     hypothyroidism     Past Surgical History:   Procedure Laterality Date    CHOLECYSTECTOMY      COLONOSCOPY      TUMOR REMOVAL      side of abdomen     Home Situation:   Social/Functional History  Lives With: Son  Type of Home: House  Home Layout: One level  Home Access: Stairs to enter with rails  Entrance Stairs - Number of Steps: 3  Entrance Stairs - Rails: Both  Bathroom Shower/Tub: Tub/Shower unit  Bathroom Toilet: Standard  Home Equipment: Cane, Walker - Rolling  Prior Level of Assist for ADLs: Independent  [x]  Right hand dominant   []  Left hand dominant    Cognitive/Behavioral Status:  Orientation  Orientation Level: Oriented to place;Oriented to person;Oriented to situation  Cognition  Overall Cognitive Status: WFL    Skin: Intact  Edema: None noted    Vision/Perceptual:

## 2024-12-09 NOTE — PROGRESS NOTES
[facility]  Progress Note    Patient: Rosie Resendiz Age: 89 y.o. : 1926 MR#: [id number] SSN: [id number]  Date/Time: 2024     Subjective: Patient sitting in the recliner, feeling much better.  Denies any dizziness or lightheadedness today.  Headache better.  Denies any weakness or numbness anywhere the body.  She had some left groin pain last night but resolved now.  Denies any nausea vomiting, no diarrhea.     Assessment/Plan:   1 Headache, better   2 Dizziness   3 Orthostatic hypotension  4 acute renal failure  4 history dysphagia - Sp Esophageal dilatation   5 Hypothyroid on replacement   6 HTN   7 Macrocytosis   8 Lumbar pain Chronic - on active treatment with Pain management     Plan  Will start gentle hydration, monitor creatinine  Will monitor orthostatic vitals  MRI pending, doubt CVA  Continue aspirin and statin  Will hold hypertensive medication for now  PT/OT eval when treatment  Further care based on hospital course    Discussed with patient and also with son over the phone about my above plan care.  Discussed about possibility of discharge tomorrow if workup negative.  Patient and son agree with my plan.      Dispo plan: Home with home health care once medically stable, anticipated discharge date 12/10/2024      Case discussed with:  [x]Patient  [x]Family  [x]Nursing  []Case Management  DVT Prophylaxis:  []Lovenox  [x]Hep SQ  []SCDs  []Coumadin   []Eliquis/Xarelto     Objective:   VS: BP (!) 155/57   Pulse 71   Temp 97.3 °F (36.3 °C) (Oral)   Resp 18   Ht 1.575 m (5' 2\")   Wt 58.1 kg (128 lb)   SpO2 96%   BMI 23.41 kg/m²    Tmax/24hrs: Temp (24hrs), Av.8 °F (36.6 °C), Min:97.3 °F (36.3 °C), Max:98.5 °F (36.9 °C)  IOBRIEFNo intake or output data in the 24 hours ending 24 1332    General:  Alert, cooperative, no acute distress    Pulmonary:  CTA Bilaterally. No Wheezing/Rales.  Cardiovascular: Regular rate and Rhythm.  GI:  Soft, Non  Calcium 9.4 8.5 - 10.1 MG/DL   Lipid Panel    Collection Time: 12/09/24  4:19 AM   Result Value Ref Range    LIPID PANEL        Cholesterol, Total 164 <200 MG/DL    Triglycerides 129 <150 MG/DL    HDL 83 (H) 40 - 60 MG/DL    LDL Cholesterol 55.2 0 - 100 MG/DL    VLDL Cholesterol Calculated 25.8 MG/DL    Chol/HDL Ratio 2.0 0 - 5.0     CBC with Auto Differential    Collection Time: 12/09/24  4:19 AM   Result Value Ref Range    WBC 4.7 4.6 - 13.2 K/uL    RBC 3.69 (L) 4.20 - 5.30 M/uL    Hemoglobin 12.4 12.0 - 16.0 g/dL    Hematocrit 38.2 35.0 - 45.0 %    .5 (H) 78.0 - 100.0 FL    MCH 33.6 24.0 - 34.0 PG    MCHC 32.5 31.0 - 37.0 g/dL    RDW 14.6 (H) 11.6 - 14.5 %    Platelets 191 135 - 420 K/uL    MPV 10.9 9.2 - 11.8 FL    Nucleated RBCs 0.0 0  WBC    nRBC 0.00 0.00 - 0.01 K/uL    Neutrophils % 60 40 - 73 %    Lymphocytes % 25 21 - 52 %    Monocytes % 13 (H) 3 - 10 %    Eosinophils % 1 0 - 5 %    Basophils % 1 0 - 2 %    Immature Granulocytes % 0 0.0 - 0.5 %    Neutrophils Absolute 2.8 1.8 - 8.0 K/UL    Lymphocytes Absolute 1.2 0.9 - 3.6 K/UL    Monocytes Absolute 0.6 0.05 - 1.2 K/UL    Eosinophils Absolute 0.1 0.0 - 0.4 K/UL    Basophils Absolute 0.0 0.0 - 0.1 K/UL    Immature Granulocytes Absolute 0.0 0.00 - 0.04 K/UL    Differential Type AUTOMATED         Signed By: Jill Lemon MD     December 9, 2024      Disclaimer: Sections of this note are dictated using utilizing voice recognition software.  Minor typographical errors may be present. If questions arise, please do not hesitate to contact me or call our department.

## 2024-12-09 NOTE — PLAN OF CARE
Problem: Discharge Planning  Goal: Discharge to home or other facility with appropriate resources  Description: Patient lives at home with son, no discharge barriers   Outcome: Progressing     Problem: Safety - Adult  Goal: Free from fall injury  Description: Fall precautions in place  Outcome: Progressing     Problem: Neurosensory - Adult  Goal: Achieves stable or improved neurological status  Description: Patient w/ no complaints of dizziness today  Outcome: Progressing  Goal: Achieves maximal functionality and self care  Outcome: Completed     Problem: Pain  Goal: Verbalizes/displays adequate comfort level or baseline comfort level  Description: Patient w/ headache/ hip pain this AM relieved with tylenol and heat pack.  No other complaints of pain this shift  Outcome: Progressing

## 2024-12-09 NOTE — PROGRESS NOTES
Advance Care Planning   Healthcare Decision Maker:    Primary Decision Maker: Jareth Resendiz - Child - [phone]    Today we documented Decision Maker(s) consistent with Legal Next of Kin hierarchy.     Spiritual Health History and Assessment/Progress Note  [facility]    Initial Encounter,  ,  ,      Name: Rosie Resendiz MRN: [id number]    Age: 89 y.o.     Sex: female   Language: English   Hoahaoism: Oriental orthodox   Stroke-like symptom     Date: 12/9/2024            Total Time Calculated: 7 min              Spiritual Assessment began in [address] MEDICAL        Referral/Consult From: Rounding   Encounter Overview/Reason: Initial Encounter  Service Provided For: Patient    Kady, Belief, Meaning:   Patient has beliefs or practices that help with coping during difficult times  Family/Friends have beliefs or practices that help with coping during difficult times      Importance and Influence:  Patient has spiritual/personal beliefs that influence decisions regarding their health  Family/Friends have spiritual/personal beliefs that influence decisions regarding the patient's health    Community:  Patient feels well-supported. Support system includes: Children  Family/Friends are connected with a spiritual community:    Assessment and Plan of Care:     Patient Interventions include: Affirmed coping skills/support systems  Family/Friends Interventions include: Affirmed coping skills/support systems    Patient Plan of Care: No spiritual needs identified for follow-up  Family/Friends Plan of Care: No spiritual needs identified for follow-up    Electronically signed by SNEHAL Rivera on 12/9/2024 at 2:56 PM

## 2024-12-09 NOTE — PROGRESS NOTES
4 Eyes Skin Assessment     NAME:  Rosie Resendiz  YOB: 1926  MEDICAL RECORD NUMBER:  [id number]    The patient is being assessed for  Shift Handoff    I agree that at least one RN has performed a thorough Head to Toe Skin Assessment on the patient. ALL assessment sites listed below have been assessed.      Areas assessed by both nurses:    Head, Face, Ears, Shoulders, Back, Chest, Arms, Elbows, Hands, Sacrum. Buttock, Coccyx, Ischium, Legs. Feet and Heels, and Under Medical Devices         Does the Patient have a Wound? Yes wound(s) were present on assessment. LDA wound assessment was Initiated and completed by RN       Mark Prevention initiated by RN: Yes  Wound Care Orders initiated by RN: No    Pressure Injury (Stage 3,4, Unstageable, DTI, NWPT, and Complex wounds) if present, place Wound referral order by RN under : No    New Ostomies, if present place, Ostomy referral order under : No     Nurse 1 eSignature: Electronically signed by Kati Morejon RN on 12/9/24 at 6:09 AM EST    **SHARE this note so that the co-signing nurse can place an eSignature**    Nurse 2 eSignature: Electronically signed by Tiffany Martin RN on 12/9/24 at 7:14 AM EST

## 2024-12-09 NOTE — PLAN OF CARE
Problem: SLP Adult - Impaired Swallowing  Goal: By Discharge: Advance to least restrictive diet without signs or symptoms of aspiration for planned discharge setting.  See evaluation for individualized goals.  Description: Patient will:  1. Tolerate PO trials with 0 s/s overt distress in 4/5 trials  2. Utilize compensatory swallow strategies/maneuvers (decrease bite/sip, size/rate, alt. liq/sol) with min cues in 4/5 trials  3. Perform oral-motor/laryngeal exercises to increase oropharyngeal swallow function with min cues  4. Complete an objective swallow study (i.e., MBSS) to assess swallow integrity, r/o aspiration, and determine of safest LRD, min A as indicated/ordered by MD     Rec:     Soft and bite sized diet with thin liquids  Aspiration precautions  HOB >45 during po intake, remain >30 for 30-45 minutes after po   Small bites/sips; alternate liquid/solid with slow feeding rate   Oral care TID  Meds whole/halved in applesauce    Outcome: Progressing  SPEECH LANGUAGE PATHOLOGY BEDSIDE SWALLOW EVALUATION/TREATMENT    Patient: Rosie Resendiz (89 y.o. female)  Date: 12/9/2024  Primary Diagnosis: Dizziness [R42]  Headache behind the eyes [R51.9]  Stroke-like symptoms [R29.90]  Stroke-like symptom [R29.90]  Migraine without status migrainosus, not intractable, unspecified migraine type [G43.909]       Precautions: Aspiration  PLOF: As per H&P  ASSESSMENT:  Based on the objective data described below, the patient presents with oropharyngeal swallow fxn that appears WNL with concerns of esophageal dysphagia. Pt reports a hx of food getting stuck with recent GI visit that revealed hiatal hernia and narrowing of the esophagus with no recommended intervention. Pt tolerated reg solids and thin liquids + straw consecutive swallows without overt s/s of aspiration. Positive oral clearance and functional laryngeal elevation to palpation with PO. Pt reports dry meats are difficult and utilizes compensatory strategies of small  therapy is recommended during this hospitalization     SUBJECTIVE:   Patient stated, \"I can do all of that if it makes it easier!.\"    OBJECTIVE:     Past Medical History:   Diagnosis Date    Gout     Hearing loss     Hypercholesterolemia     Hypertension     Nipple discharge     Dark blood intermitten    Thyroid disease     hypothyroidism     Past Surgical History:   Procedure Laterality Date    CHOLECYSTECTOMY      COLONOSCOPY      TUMOR REMOVAL      side of abdomen     Prior Level of Function/Home Situation:  Social/Functional History  Lives With: Son  Type of Home: House  Home Layout: One level  Home Access: Stairs to enter with rails  Entrance Stairs - Number of Steps: 3  Entrance Stairs - Rails: Both  Bathroom Shower/Tub: Tub/Shower unit  Bathroom Toilet: Standard  Home Equipment: Cane, Walker - Rolling  Prior Level of Assist for ADLs: Independent    Daily Assessment:  Baseline Assessment  Respiratory Status: Room air  O2 Device: None (Room air)  Communication Observation: Functional  Follows Directions: Simple  Current Diet : Soft and Bite-Sized  Current Liquid Diet : Thin  Dentition: Adequate  Patient Positioning: Upright in chair  Baseline Vocal Quality: Normal  Volitional Cough: Strong    Orientation:  Person, Place, Time, and Situation    Oral Assessment:  Oral Motor   Labial: No impairment  Dentition: Natural  Oral Hygiene: Moist, Clean  Lingual: No impairment  Mandible: No impairment        P.O. Trials:  PO Trials  Assessment Method(s): Observation, Palpation  Patient Position: Upright in recliner  Vocal Quality: No Impairment  Consistency Presented: Regular, Thin  How Presented: Self-fed/presented, Straw  Bolus Acceptance: No impairment  Bolus Formation/Control: No impairment  Propulsion: No impairment  Oral Residue: 10-50% of bolus  Initiation of Swallow: No impairment  Laryngeal Elevation: Functional  Aspiration Signs/Symptoms: None  Pharyngeal Phase Characteristics: No impairment, issues, or

## 2024-12-09 NOTE — PROGRESS NOTES
Pt IV leaking.  Attempt x2 to restart IV unsuccessful.  Call made to ED to request to attempt IV placement    1715: New IV placed. IV Fluids resumed

## 2024-12-10 VITALS
OXYGEN SATURATION: 96 % | BODY MASS INDEX: 23.55 KG/M2 | WEIGHT: 128 LBS | TEMPERATURE: 97.4 F | HEIGHT: 62 IN | SYSTOLIC BLOOD PRESSURE: 147 MMHG | HEART RATE: 75 BPM | RESPIRATION RATE: 16 BRPM | DIASTOLIC BLOOD PRESSURE: 54 MMHG

## 2024-12-10 PROBLEM — R51.9 HEADACHE BEHIND THE EYES: Status: RESOLVED | Noted: 2024-12-08 | Resolved: 2024-12-10

## 2024-12-10 PROBLEM — R29.90 STROKE-LIKE SYMPTOM: Status: RESOLVED | Noted: 2024-12-08 | Resolved: 2024-12-10

## 2024-12-10 LAB
ANION GAP SERPL CALC-SCNC: 5 MMOL/L (ref 3–18)
BUN SERPL-MCNC: 20 MG/DL (ref 7–18)
BUN/CREAT SERPL: 14 (ref 12–20)
CALCIUM SERPL-MCNC: 9.1 MG/DL (ref 8.5–10.1)
CHLORIDE SERPL-SCNC: 108 MMOL/L (ref 100–111)
CO2 SERPL-SCNC: 23 MMOL/L (ref 21–32)
CREAT SERPL-MCNC: 1.43 MG/DL (ref 0.6–1.3)
ECHO AO ASC DIAM: 3 CM
ECHO AO ASCENDING AORTA INDEX: 1.9 CM/M2
ECHO AO ROOT DIAM: 3.2 CM
ECHO AO ROOT INDEX: 2.03 CM/M2
ECHO AV AREA PEAK VELOCITY: 2.2 CM2
ECHO AV AREA VTI: 2.8 CM2
ECHO AV AREA/BSA PEAK VELOCITY: 1.4 CM2/M2
ECHO AV AREA/BSA VTI: 1.8 CM2/M2
ECHO AV MEAN GRADIENT: 4 MMHG
ECHO AV MEAN VELOCITY: 1 M/S
ECHO AV PEAK GRADIENT: 6 MMHG
ECHO AV PEAK VELOCITY: 1.3 M/S
ECHO AV VELOCITY RATIO: 0.69
ECHO AV VTI: 30.2 CM
ECHO BSA: 1.59 M2
ECHO LA VOL A-L A2C: 32 ML (ref 22–52)
ECHO LA VOL A-L A4C: 84 ML (ref 22–52)
ECHO LA VOL BP: 51 ML (ref 22–52)
ECHO LA VOL MOD A2C: 32 ML (ref 22–52)
ECHO LA VOL MOD A4C: 78 ML (ref 22–52)
ECHO LA VOL/BSA BIPLANE: 32 ML/M2 (ref 16–34)
ECHO LA VOLUME AREA LENGTH: 54 ML
ECHO LA VOLUME INDEX A-L A2C: 20 ML/M2 (ref 16–34)
ECHO LA VOLUME INDEX A-L A4C: 53 ML/M2 (ref 16–34)
ECHO LA VOLUME INDEX AREA LENGTH: 34 ML/M2 (ref 16–34)
ECHO LA VOLUME INDEX MOD A2C: 20 ML/M2 (ref 16–34)
ECHO LA VOLUME INDEX MOD A4C: 49 ML/M2 (ref 16–34)
ECHO LV E' LATERAL VELOCITY: 7.26 CM/S
ECHO LV E' SEPTAL VELOCITY: 5.87 CM/S
ECHO LV EDV A2C: 55 ML
ECHO LV EDV A4C: 58 ML
ECHO LV EDV BP: 57 ML (ref 56–104)
ECHO LV EDV INDEX A4C: 37 ML/M2
ECHO LV EDV INDEX BP: 36 ML/M2
ECHO LV EDV NDEX A2C: 35 ML/M2
ECHO LV EJECTION FRACTION A2C: 76 %
ECHO LV EJECTION FRACTION A4C: 63 %
ECHO LV EJECTION FRACTION BIPLANE: 70 % (ref 55–100)
ECHO LV ESV A2C: 13 ML
ECHO LV ESV A4C: 21 ML
ECHO LV ESV BP: 17 ML (ref 19–49)
ECHO LV ESV INDEX A2C: 8 ML/M2
ECHO LV ESV INDEX A4C: 13 ML/M2
ECHO LV ESV INDEX BP: 11 ML/M2
ECHO LV FRACTIONAL SHORTENING: 30 % (ref 28–44)
ECHO LV INTERNAL DIMENSION DIASTOLE INDEX: 2.78 CM/M2
ECHO LV INTERNAL DIMENSION DIASTOLIC: 4.4 CM (ref 3.9–5.3)
ECHO LV INTERNAL DIMENSION SYSTOLIC INDEX: 1.96 CM/M2
ECHO LV INTERNAL DIMENSION SYSTOLIC: 3.1 CM
ECHO LV IVSD: 0.7 CM (ref 0.6–0.9)
ECHO LV MASS 2D: 92.1 G (ref 67–162)
ECHO LV MASS INDEX 2D: 58.3 G/M2 (ref 43–95)
ECHO LV POSTERIOR WALL DIASTOLIC: 0.7 CM (ref 0.6–0.9)
ECHO LV RELATIVE WALL THICKNESS RATIO: 0.32
ECHO LVOT AREA: 3.1 CM2
ECHO LVOT AV VTI INDEX: 0.91
ECHO LVOT DIAM: 2 CM
ECHO LVOT MEAN GRADIENT: 2 MMHG
ECHO LVOT PEAK GRADIENT: 3 MMHG
ECHO LVOT PEAK VELOCITY: 0.9 M/S
ECHO LVOT STROKE VOLUME INDEX: 54.7 ML/M2
ECHO LVOT SV: 86.4 ML
ECHO LVOT VTI: 27.5 CM
ECHO MV A VELOCITY: 0.95 M/S
ECHO MV E DECELERATION TIME (DT): 289.4 MS
ECHO MV E VELOCITY: 1.03 M/S
ECHO MV E VELOCITY: 1.04 M/S
ECHO PV MAX VELOCITY: 1 M/S
ECHO PV PEAK GRADIENT: 4 MMHG
ECHO RA AREA 4C: 12 CM2
ECHO RA VOLUME: 29 ML
ECHO RA VOLUME: 30 ML
ECHO RV BASAL DIMENSION: 3 CM
ECHO RV FREE WALL PEAK S': 13.1 CM/S
ECHO RV MID DIMENSION: 2.1 CM
ECHO RV TAPSE: 2.2 CM (ref 1.7–?)
ECHO TV REGURGITANT MAX VELOCITY: 2.35 M/S
ECHO TV REGURGITANT PEAK GRADIENT: 22 MMHG
GLUCOSE SERPL-MCNC: 153 MG/DL (ref 74–99)
POTASSIUM SERPL-SCNC: 3.9 MMOL/L (ref 3.5–5.5)
SODIUM SERPL-SCNC: 136 MMOL/L (ref 136–145)

## 2024-12-10 PROCEDURE — 51798 US URINE CAPACITY MEASURE: CPT

## 2024-12-10 PROCEDURE — G0378 HOSPITAL OBSERVATION PER HR: HCPCS

## 2024-12-10 PROCEDURE — 6370000000 HC RX 637 (ALT 250 FOR IP): Performed by: INTERNAL MEDICINE

## 2024-12-10 PROCEDURE — 80048 BASIC METABOLIC PNL TOTAL CA: CPT

## 2024-12-10 PROCEDURE — 36415 COLL VENOUS BLD VENIPUNCTURE: CPT

## 2024-12-10 PROCEDURE — 6360000002 HC RX W HCPCS: Performed by: INTERNAL MEDICINE

## 2024-12-10 PROCEDURE — 6370000000 HC RX 637 (ALT 250 FOR IP): Performed by: HOSPITALIST

## 2024-12-10 PROCEDURE — 93306 TTE W/DOPPLER COMPLETE: CPT | Performed by: INTERNAL MEDICINE

## 2024-12-10 PROCEDURE — 96372 THER/PROPH/DIAG INJ SC/IM: CPT

## 2024-12-10 PROCEDURE — 92526 ORAL FUNCTION THERAPY: CPT

## 2024-12-10 PROCEDURE — 99239 HOSP IP/OBS DSCHRG MGMT >30: CPT | Performed by: HOSPITALIST

## 2024-12-10 RX ORDER — ASPIRIN 81 MG/1
81 TABLET, CHEWABLE ORAL DAILY
Qty: 30 TABLET | Refills: 3 | Status: SHIPPED | OUTPATIENT
Start: 2024-12-11

## 2024-12-10 RX ADMIN — ASPIRIN 81 MG CHEWABLE TABLET 81 MG: 81 TABLET CHEWABLE at 09:49

## 2024-12-10 RX ADMIN — HEPARIN SODIUM 5000 UNITS: 5000 INJECTION INTRAVENOUS; SUBCUTANEOUS at 14:47

## 2024-12-10 RX ADMIN — HEPARIN SODIUM 5000 UNITS: 5000 INJECTION INTRAVENOUS; SUBCUTANEOUS at 07:21

## 2024-12-10 RX ADMIN — LEVOTHYROXINE SODIUM 75 MCG: 75 TABLET ORAL at 07:21

## 2024-12-10 ASSESSMENT — PAIN SCALES - GENERAL
PAINLEVEL_OUTOF10: 0
PAINLEVEL_OUTOF10: 0

## 2024-12-10 NOTE — DISCHARGE INSTRUCTIONS
Discharge Instructions    Patient: Rosie Resendiz MRN: [id number]  CSN: [id number]    YOB: 1926  Age: 89 y.o.  Sex: female    DOA: 12/8/2024       DIET:  regular diet    ACTIVITY: activity as tolerated  Home health care for Skilled care for Hypertension and medication management       PT/OT consult      ADDITIONAL INFORMATION: If you experience any of the following symptoms but not limited to Fever, chills, nausea, vomiting, diarrhea, change in mentation, falling, bleeding, shortness of breath, chest pain, please call your primary care physician or return to the emergency room if you cannot get hold of your doctor:     FOLLOW UP CARE:   Follow-up Information     Jero Prince MD. Schedule an appointment as soon as possible for a   visit in 1 week(s).    Specialty: Internal Medicine  Contact information:  [address] [place] [id number] [phone]                       Jill Lemon MD  12/10/2024 2:28 PM

## 2024-12-10 NOTE — PLAN OF CARE
Problem: Discharge Planning  Goal: Discharge to home or other facility with appropriate resources  Description: Patient lives at home with son, no discharge barriers   Outcome: Progressing     Problem: Safety - Adult  Goal: Free from fall injury  Description: Fall precautions in place  12/10/2024 1235 by Tiffany Martin RN  Outcome: Progressing  12/9/2024 2254 by Alhaji Mckenna RN  Outcome: Progressing     Problem: Neurosensory - Adult  Goal: Achieves stable or improved neurological status  Description: Patient w/ no complaints of dizziness today  12/10/2024 1235 by Tiffany Martin RN  Outcome: Progressing  12/9/2024 2254 by Alhaji Mckenna RN  Outcome: Progressing     Problem: Pain  Goal: Verbalizes/displays adequate comfort level or baseline comfort level  Description: No complaints of pain  Outcome: Progressing     Problem: Skin/Tissue Integrity - Adult  Goal: Skin integrity remains intact  Description: Assess skin, encourage repositioning.  Patient able to turn self  Outcome: Progressing

## 2024-12-10 NOTE — CARE COORDINATION
12/10/24 1153   IMM Letter   IMM Letter given to Patient/Family/Significant other/Guardian/POA/by: Kitty Alvares RN CM, Copy to Patient, Original on Hard Chart.   IMM Letter date given: 12/10/24   IMM Letter time given: 5615

## 2024-12-10 NOTE — PROGRESS NOTES
4 Eyes Skin Assessment     NAME:  Rosie Resendiz  YOB: 1926  MEDICAL RECORD NUMBER:  [id number]    The patient is being assessed for  Shift Handoff    I agree that at least one RN has performed a thorough Head to Toe Skin Assessment on the patient. ALL assessment sites listed below have been assessed.      Areas assessed by both nurses:    Head, Face, Ears, Shoulders, Back, Chest, Arms, Elbows, Hands, Sacrum. Buttock, Coccyx, Ischium, Legs. Feet and Heels, and Under Medical Devices         Does the Patient have a Wound? No noted wound(s)       Mark Prevention initiated by RN: No  Wound Care Orders initiated by RN: No    Pressure Injury (Stage 3,4, Unstageable, DTI, NWPT, and Complex wounds) if present, place Wound referral order by RN under : No    New Ostomies, if present place, Ostomy referral order under : No     Nurse 1 eSignature: Electronically signed by Tiffany Martin RN on 12/9/24 at 7:12 PM EST    **SHARE this note so that the co-signing nurse can place an eSignature**    Nurse 2 eSignature: {Esignature:[id number]}

## 2024-12-10 NOTE — DISCHARGE SUMMARY
Discharge Summary    Patient: Rosie Resendiz MRN: [id number]  CSN: [id number]    YOB: 1926  Age: 89 y.o.  Sex: female    DOA: 12/8/2024 LOS:  LOS: 2 days        Disposition: Home with [facility]    Discharge Date: 12/10/2024    Admission Diagnosis: Dizziness [R42]  Headache behind the eyes [R51.9]  Stroke-like symptoms [R29.90]  Stroke-like symptom [R29.90]  Migraine without status migrainosus, not intractable, unspecified migraine type [G43.909]    Discharge Diagnosis:    Headache, better   Dizziness, resolved, CVA ruled out  Orthostatic hypotension  Possible autonomic dysfunction  Mild dehydration  Acute renal failure  Possible CKD stage II  history dysphagia - Sp Esophageal dilatation   Hypothyroid  HTN       Discharge Condition: Stable      PHYSICAL EXAM  Visit Vitals  BP (!) 147/54   Pulse 75   Temp 97.4 °F (36.3 °C) (Oral)   Resp 16   Ht 1.575 m (5' 2\")   Wt 58.1 kg (128 lb)   SpO2 96%   BMI 23.41 kg/m²       General: Alert, cooperative, no acute distress    Lungs:  CTA Bilaterally. No Wheezing/Rales.  Heart:             Regular rate and Rhythm.  Abdomen: Soft, Non distended, Non tender. + Bowel sounds.  Extremities: No edema.  Psych:   Not anxious or agitated.  Neurologic:  AA, oriented X 3. Moves all ext                                 Hospital Course:   Rosie Resendiz is a 89 y.o. female with PMH of hypothyroidism on replacement, hyperlipidemia, hypertension, came to the emergency room via transport and on wheelchair with a history of severe headache and dizziness started last week.  No focal deficit noted.  ED physician consulted teleneurology suspecting stroke no deficit was noted however due to headache and age of patient one of the possibility of temporal arteritis was placed with mildly elevated ESR.  No other symptomatology no temporal tenderness throbbing visual disturbance.  Patient denies chest pain palpitation no shortness of breath nausea vomiting diarrhea     Patient was admitted to

## 2024-12-10 NOTE — CARE COORDINATION
Met with Patient at bedside. CM introduces self and explains role. Patient is alert and oriented x 4. HIPAA verified. Patients' demographics verified and updated accordingly. Patient agrees to complete assessment.  Patient is medically ready with a discharge order.     DCP: Home with self- care and supportive son, (declined HHS) ( declined RW and SC) , son to transport patient home via POV after 1400 today.     No further CM needs identified, CM will remain available should further needs arise.      12/10/24 1153   Service Assessment   Patient Orientation Alert and Oriented;Person;Place;Situation;Self   Cognition Alert   History Provided By Patient   Primary Caregiver Self   Accompanied By/Relationship SELF   Support Systems Family Members;/   Patient's Healthcare Decision Maker is: Legal Next of Kin   PCP Verified by CM Yes   Last Visit to PCP Within last 3 months   Prior Functional Level Assistance with the following:;Housework;Shopping;Other (see comment)  (Transportation)   Current Functional Level Assistance with the following:;Housework;Shopping;Other (see comment)  (Transportation)   Can patient return to prior living arrangement Yes   Ability to make needs known: Good   Family able to assist with home care needs: Yes   Would you like for me to discuss the discharge plan with any other family members/significant others, and if so, who? No   Financial Resources Medicare   Community Resources None   Social/Functional History   Lives With Son   Type of Home House   Home Layout One level   Home Access Stairs to enter with rails   Entrance Stairs - Number of Steps 3   Entrance Stairs - Rails Both   Bathroom Shower/Tub Tub/Shower unit   Bathroom Toilet Standard   Bathroom Equipment None   Bathroom Accessibility Accessible   Home Equipment Cane   Receives Help From Family   Prior Level of Assist for ADLs Independent   Prior Level of Assist for Homemaking Needs assistance   Meal Prep Minimal    Laundry Moderate   Vacuuming Total   Cleaning Total   Gardening Total   Yard Work Total   Driving Total   Shopping Total   Homemaking Responsibilities No   Ambulation Assistance Independent   Prior Level of Assist for Transfers Independent   Active  No   Patient's  Info Patients son transports her where she needs to go.   Occupation Retired   Discharge Planning   Type of Residence House   Living Arrangements Children   Current Services Prior To Admission Durable Medical Equipment   Current DME Prior to Arrival Cane   Potential Assistance Needed Home Care;Durable Medical Equipment   Potential DME Needed Other (Comment)  (Patient is recommeded a RW and a SC, she declines both stating her house is too small, she would have to  the walker to manuever around furniture, the shower chair will not fit in her bathroom. She showers when her son is home .)   DME Ordered? Other (comment)  (Patient declines the RW and SC.)   Potential Assistance Purchasing Medications No   Type of Home Care Services Nursing Services;OT;PT;Aide Services  (Patient declines [facility], she states, \" I really do not feel that it is necessary, I feel alot better. \")   Patient expects to be discharged to: House   Follow Up Appointment: Best Day/Time    (TBD)   One/Two Story Residence One story   History of falls? 1   Services At/After Discharge   Transition of Care Consult (CM Consult) Home Health;Discharge Planning;DME/Supply Assistance   Internal Home Health No   Reason Outside Agency Chosen Patient declined ordered home care/hospice services   Services At/After Discharge DME   [place] Resource Information Provided? No   Mode of Transport at Discharge Other (see comment)  (It was reported in IDRs that patients son will be able to transport her home today after 1400 via POV.)   Hospital Transport Time of Discharge   (P 1400)   Confirm Follow Up Transport Self   Condition of Participation: Discharge Planning   The Plan for Transition of

## 2024-12-10 NOTE — PLAN OF CARE
Problem: SLP Adult - Impaired Swallowing  Goal: By Discharge: Advance to least restrictive diet without signs or symptoms of aspiration for planned discharge setting.  See evaluation for individualized goals.  Description: Patient will:  1. Tolerate PO trials with 0 s/s overt distress in 4/5 trials  2. Utilize compensatory swallow strategies/maneuvers (decrease bite/sip, size/rate, alt. liq/sol) with min cues in 4/5 trials  3. Perform oral-motor/laryngeal exercises to increase oropharyngeal swallow function with min cues  4. Complete an objective swallow study (i.e., MBSS) to assess swallow integrity, r/o aspiration, and determine of safest LRD, min A as indicated/ordered by MD     Rec:     Soft and bite sized diet with thin liquids  Aspiration precautions  HOB >45 during po intake, remain >30 for 30-45 minutes after po   Small bites/sips; alternate liquid/solid with slow feeding rate   Oral care TID  Meds per pt preference    Outcome: Progressing  SPEECH LANGUAGE PATHOLOGY DYSPHAGIA TREATMENT    Patient: Rosie Resendiz (89 y.o. female)  Date: 12/10/2024  Diagnosis: Dizziness [R42]  Headache behind the eyes [R51.9]  Stroke-like symptoms [R29.90]  Stroke-like symptom [R29.90]  Migraine without status migrainosus, not intractable, unspecified migraine type [G43.909] Stroke-like symptom      Precautions: Standard, aspiration  PLOF: As per H&P      ASSESSMENT:  Pt seen for follow up dysphagia management. Pt reports pills whole in applesauce is \"helping them not get stuck\". Pt tolerating soft and bite sized solids and thin liquids + straw consecutive swallows without overt s/s of aspiration. Positive oral clearance and functional laryngeal elevation to palpation. Reviewed safe swallow techniques to ease swallow function due to hx of esophageal like symptoms- verbalized understanding. Recommend soft and bite sized solids and thin liquids, aspiration precautions, oral care TID, and meds whole in puree. Will continue to  and thickener instructions provided.  [x]         Posted safety precautions in patient's room.    Thank you for this referral.    Le Samano M.S. CCC-SLP  Speech Language Pathologist

## 2024-12-10 NOTE — PROGRESS NOTES
Orthostatic Vitals:      12/10/2024     9:30 AM   Orthostatic Vitals   Orthostatic B/P and Pulse? Yes   Blood Pressure Lying 157/64   Pulse Lying 83 PER MINUTE   Blood Pressure Sitting 131/70   Pulse Sitting 83 PER MINUTE   Blood Pressure Standing 117/63   Pulse Standing 97 PER MINUTE      No complaints of dizziness

## 2024-12-10 NOTE — PLAN OF CARE
Problem: Neurosensory - Adult  Goal: Achieves stable or improved neurological status  Description: Patient w/ no complaints of dizziness today  12/9/2024 2254 by Alhaji Mckenna, RN  Outcome: Progressing  12/9/2024 1736 by Tiffany Martin, RN  Outcome: Progressing     Problem: Pain  Goal: Verbalizes/displays adequate comfort level or baseline comfort level  Description: Patient w/ headache/ hip pain this AM relieved with tylenol and heat pack.  No other complaints of pain this shift  12/9/2024 1736 by Tiffany Martin, RN  Outcome: Progressing

## (undated) DEVICE — GAUZE SPONGES,16 PLY: Brand: CURITY

## (undated) DEVICE — STERILE POLYISOPRENE POWDER-FREE SURGICAL GLOVES: Brand: PROTEXIS

## (undated) DEVICE — FLEX ADVANTAGE 3000CC: Brand: FLEX ADVANTAGE

## (undated) DEVICE — BITE BLOCK ENDOSCP UNIV AD 6 TO 9.4 MM

## (undated) DEVICE — FLUFF AND POLYMER UNDERPAD,EXTRA HEAVY: Brand: WINGS

## (undated) DEVICE — SOLUTION IRRIG 1000ML H2O STRL BLT

## (undated) DEVICE — BASIN EMESIS 500CC ROSE 250/CS 60/PLT: Brand: MEDEGEN MEDICAL PRODUCTS, LLC

## (undated) DEVICE — SYRINGE MED 25GA 3ML L5/8IN SUBQ PLAS W/ DETACH NDL SFTY

## (undated) DEVICE — ENDOSCOPY PUMP TUBING/ CAP SET: Brand: ERBE

## (undated) DEVICE — AIRLIFE™ NASAL OXYGEN CANNULA CURVED, FLARED TIP WITH 14 FOOT (4.3 M) CRUSH-RESISTANT TUBING, OVER-THE-EAR STYLE: Brand: AIRLIFE™

## (undated) DEVICE — CATHETER SUCT TR FL TIP 14FR W/ O CTRL

## (undated) DEVICE — MEDI-VAC NON-CONDUCTIVE SUCTION TUBING: Brand: CARDINAL HEALTH

## (undated) DEVICE — FORCEPS BX L240CM JAW DIA2.8MM L CAP W/ NDL MIC MESH TOOTH

## (undated) DEVICE — MEDI-VAC SUCTION HIGH CAPACITY: Brand: CARDINAL HEALTH

## (undated) DEVICE — SYR 50ML SLIP TIP NSAF LF STRL --